# Patient Record
Sex: FEMALE | Race: WHITE | Employment: OTHER | URBAN - METROPOLITAN AREA
[De-identification: names, ages, dates, MRNs, and addresses within clinical notes are randomized per-mention and may not be internally consistent; named-entity substitution may affect disease eponyms.]

---

## 2017-06-23 DIAGNOSIS — E78.5 HYPERLIPIDEMIA WITH TARGET LDL LESS THAN 130: ICD-10-CM

## 2017-06-23 RX ORDER — SIMVASTATIN 40 MG
TABLET ORAL
Qty: 30 TABLET | Refills: 0 | Status: SHIPPED | OUTPATIENT
Start: 2017-06-23 | End: 2017-07-10

## 2017-06-23 NOTE — TELEPHONE ENCOUNTER
Simvastatin       Last Written Prescription Date: 5/31/16  Last Fill Quantity: 90, # refills: 3    Last Office Visit with [facility], [facility] or [facility] prescribing provider:  6/6/16   Future Office Visit:      Cholesterol   Date Value Ref Range Status   04/21/2015 169 <200 mg/dL Final     Comment:     LDL Cholesterol is the primary guide to therapy.   The NCEP recommends further evaluation of: patients with cholesterol greater   than 200 mg/dL if additional risk factors are present, cholesterol greater   than   240 mg/dL, triglycerides greater than 150 mg/dL, or HDL less than 40 mg/dL.       HDL Cholesterol   Date Value Ref Range Status   04/21/2015 63 >50 mg/dL Final     LDL Cholesterol Calculated   Date Value Ref Range Status   04/21/2015 74 0 - 129 mg/dL Final     Comment:     LDL Cholesterol is the primary guide to therapy: LDL-cholesterol goal in high   risk patients is <100 mg/dL and in very high risk patients is <70 mg/dL.       LDL Cholesterol Direct   Date Value Ref Range Status   05/31/2016 110 (H) <100 mg/dL Final     Comment:     Above desirable:  100-129 mg/dl   Borderline High:  130-159 mg/dL   High:             160-189 mg/dL   Very high:       >189 mg/dl       Triglycerides   Date Value Ref Range Status   04/21/2015 158 (H) 0 - 150 mg/dL Final     Cholesterol/HDL Ratio   Date Value Ref Range Status   04/21/2015 2.7 0.0 - 5.0 Final     ALT   Date Value Ref Range Status   05/31/2016 36 0 - 50 U/L Final

## 2017-06-23 NOTE — TELEPHONE ENCOUNTER
Prescription approved per [facility] Refill Protocol.  Patient due for office visit for further refills.  Hiral Cabrera RN - BC

## 2017-06-29 DIAGNOSIS — I10 HYPERTENSION GOAL BP (BLOOD PRESSURE) < 150/90: ICD-10-CM

## 2017-06-29 RX ORDER — LOSARTAN POTASSIUM AND HYDROCHLOROTHIAZIDE 12.5; 5 MG/1; MG/1
TABLET ORAL
Qty: 90 TABLET | Refills: 0 | Status: SHIPPED | OUTPATIENT
Start: 2017-06-29 | End: 2017-07-10

## 2017-06-29 NOTE — TELEPHONE ENCOUNTER
Prescription approved per [facility] Refill Protocol.  Patient due for office visit for further refills.  Hiral Cabrera RN - BC

## 2017-06-29 NOTE — TELEPHONE ENCOUNTER
losartan-hydrochlorothiazide (HYZAAR) 50-12.5 MG per tablet      Last Written Prescription Date: 5/31/2016  Last Fill Quantity: 90, # refills: 3  Last Office Visit with [facility], [facility] or [facility] prescribing provider: 6/6/2016       Potassium   Date Value Ref Range Status   05/31/2016 4.1 3.4 - 5.3 mmol/L Final     Creatinine   Date Value Ref Range Status   05/31/2016 0.60 0.52 - 1.04 mg/dL Final     BP Readings from Last 3 Encounters:   06/06/16 132/86   05/31/16 126/80   12/10/15 137/87

## 2017-07-10 ENCOUNTER — OFFICE VISIT (OUTPATIENT)
Dept: FAMILY MEDICINE | Facility: CLINIC | Age: 72
End: 2017-07-10
Payer: MEDICARE

## 2017-07-10 ENCOUNTER — RADIANT APPOINTMENT (OUTPATIENT)
Dept: MAMMOGRAPHY | Facility: CLINIC | Age: 72
End: 2017-07-10
Attending: FAMILY MEDICINE
Payer: MEDICARE

## 2017-07-10 VITALS
HEART RATE: 75 BPM | OXYGEN SATURATION: 98 % | SYSTOLIC BLOOD PRESSURE: 139 MMHG | HEIGHT: 65 IN | DIASTOLIC BLOOD PRESSURE: 83 MMHG | WEIGHT: 176.8 LBS | RESPIRATION RATE: 18 BRPM | BODY MASS INDEX: 29.46 KG/M2 | TEMPERATURE: 97.4 F

## 2017-07-10 DIAGNOSIS — Z12.31 VISIT FOR SCREENING MAMMOGRAM: ICD-10-CM

## 2017-07-10 DIAGNOSIS — E78.5 HYPERLIPIDEMIA WITH TARGET LDL LESS THAN 130: ICD-10-CM

## 2017-07-10 DIAGNOSIS — I10 HYPERTENSION GOAL BP (BLOOD PRESSURE) < 150/90: Primary | ICD-10-CM

## 2017-07-10 DIAGNOSIS — I87.2 VENOUS (PERIPHERAL) INSUFFICIENCY: ICD-10-CM

## 2017-07-10 DIAGNOSIS — M85.80 OSTEOPENIA, UNSPECIFIED LOCATION: ICD-10-CM

## 2017-07-10 PROCEDURE — G0202 SCR MAMMO BI INCL CAD: HCPCS | Mod: TC

## 2017-07-10 PROCEDURE — 99213 OFFICE O/P EST LOW 20 MIN: CPT | Performed by: FAMILY MEDICINE

## 2017-07-10 RX ORDER — LOSARTAN POTASSIUM AND HYDROCHLOROTHIAZIDE 12.5; 5 MG/1; MG/1
1 TABLET ORAL DAILY
Qty: 90 TABLET | Refills: 3 | Status: SHIPPED | OUTPATIENT
Start: 2017-07-10 | End: 2018-08-01

## 2017-07-10 RX ORDER — SIMVASTATIN 40 MG
40 TABLET ORAL DAILY
Qty: 90 TABLET | Refills: 3 | Status: SHIPPED | OUTPATIENT
Start: 2017-07-10 | End: 2018-08-01

## 2017-07-10 NOTE — MR AVS SNAPSHOT
After Visit Summary   7/10/2017    Tracie Feliciano    MRN: [id number]           Patient Information     Date Of Birth          1945        Visit Information        Provider Department      7/10/2017 10:00 AM Melanie Patel MD [facility]        Today's Diagnoses     Hypertension goal BP (blood pressure) < 150/90    -  1    Hyperlipidemia with target LDL less than 130        Venous (peripheral) insufficiency        Osteopenia, unspecified location        Visit for screening mammogram          Care Instructions    [facility]    If you have any questions regarding to your visit please contact your care team:       Team Red:   Clinic Hours Telephone Number   Dr. Melanie Victor, NP   7am-7pm  Monday - Thursday   7am-5pm  Fridays  [phone]  (Appointment scheduling available 24/7)    Questions about your visit?   Team Line  [phone]   [facility] [place] [facility] [facility] 11am-9pm Monday-Friday Saturday-Sunday- 9am-5pm   [place] - 5pm-9pm Monday-Friday Saturday-Sunday- 9am-5pm  [phone] - [facility]  [phone] - [place]       What options do I have for visits at the clinic other than the traditional office visit?  To expand how we care for you, many of our providers are utilizing electronic visits (e-visits) and telephone visits, when medically appropriate, for interactions with their patients rather than a visit in the clinic.   We also offer nurse visits for many medical concerns. Just like any other service, we will bill your insurance company for this type of visit based on time spent on the phone with your provider. Not all insurance companies cover these visits. Please check with your medical insurance if this type of visit is covered. You will be responsible for any charges that are not paid by your insurance.      E-visits via REPP:  generally incur a $35.00 fee.  Telephone  visits:  Time spent on the phone: *charged based on time that is spent on the phone in increments of 10 minutes. Estimated cost:   5-10 mins $30.00   11-20 mins. $59.00   21-30 mins. $85.00     Use Twiigg (secure email communication and access to your chart) to send your primary care provider a message or make an appointment. Ask someone on your Team how to sign up for Twiigg.  For a Price Quote for your services, please call our [employer] Line at [phone].      As always, Thank you for trusting us with your health care needs!    Paco Alexander            Follow-ups after your visit        Follow-up notes from your care team     Return in about 1 year (around 7/10/2018), or if symptoms worsen or fail to improve, for Wellness visit (fasting labs up to one week prior).      Future tests that were ordered for you today     Open Future Orders        Priority Expected Expires Ordered    BASIC METABOLIC PANEL Routine  7/10/2018 7/10/2017    Lipid panel reflex to direct LDL Routine  7/10/2018 7/10/2017    MA SCREENING DIGITAL BILAT - Future  (s+30) Routine  7/3/2018 7/10/2017            Who to contact     If you have questions or need follow up information about today's clinic visit or your schedule please contact [facility] directly at [phone].  Normal or non-critical lab and imaging results will be communicated to you by Swiftpagehart, letter or phone within 4 business days after the clinic has received the results. If you do not hear from us within 7 days, please contact the clinic through Swiftpagehart or phone. If you have a critical or abnormal lab result, we will notify you by phone as soon as possible.  Submit refill requests through Twiigg or call your pharmacy and they will forward the refill request to us. Please allow 3 business days for your refill to be completed.          Additional Information About Your Visit        Twiigg Information     Twiigg lets you send messages to your  "doctor, view your test results, renew your prescriptions, schedule appointments and more. To sign up, go to [email or web address] . Click on \"Log in\" on the left side of the screen, which will take you to the Welcome page. Then click on \"Sign up Now\" on the right side of the page.     You will be asked to enter the access code listed below, as well as some personal information. Please follow the directions to create your username and password.     Your access code is: [email or web address]  Expires: 10/8/2017 10:32 AM     Your access code will  in 90 days. If you need help or a new code, please call your [facility] clinic or [phone].        Care EveryWhere ID     This is your Care EveryWhere ID. This could be used by other organizations to access your [facility] medical records  [id number]        Your Vitals Were     Pulse Temperature Respirations Height Pulse Oximetry Breastfeeding?    75 97.4  F (36.3  C) (Oral) 18 5' 5\" (1.651 m) 98% No    BMI (Body Mass Index)                   29.42 kg/m2            Blood Pressure from Last 3 Encounters:   07/10/17 139/83   16 132/86   16 126/80    Weight from Last 3 Encounters:   07/10/17 176 lb 12.8 oz (80.2 kg)   16 176 lb (79.8 kg)   16 178 lb (80.7 kg)                 Today's Medication Changes          These changes are accurate as of: 7/10/17 10:32 AM.  If you have any questions, ask your nurse or doctor.               These medicines have changed or have updated prescriptions.        Dose/Directions    losartan-hydrochlorothiazide 50-12.5 MG per tablet   Commonly known as:  HYZAAR   This may have changed:  See the new instructions.   Used for:  Hypertension goal BP (blood pressure) < 150/90   Changed by:  Melanie Patel MD        Dose:  1 tablet   Take 1 tablet by mouth daily   Quantity:  90 tablet   Refills:  3       simvastatin 40 MG tablet   Commonly known as:  ZOCOR   This may have changed:  See the new instructions.   Used for:  " Hyperlipidemia with target LDL less than 130   Changed by:  Melanie Patel MD        Dose:  40 mg   Take 1 tablet (40 mg) by mouth daily   Quantity:  90 tablet   Refills:  3            Where to get your medicines      These medications were sent to [id number] - [place] - [address] AT [facility] [address]  [address] [facility] [facility] [address], [place] [address]     Phone:  [phone]     losartan-hydrochlorothiazide 50-12.5 MG per tablet    simvastatin 40 MG tablet                Primary Care Provider Office Phone # Fax #    Melanie Patel [phone]       [facility] [address] [address]        Equal Access to Services     [facility] HospitalJOSEPHINE : Hadii [place] hadasho Soomaali, waaxda luqadaha, qaybta kaalmada adeegyada, waxjamey merlos . So [facility] [phone].    ATENCIÓN: Si habla español, tiene a barbosa disposición servicios gratuitos de asistencia lingüística. Ll[facility]tering [facility] [phone].    We comply with applicable federal civil rights laws and [place] laws. We do not discriminate on the basis of race, color, national origin, age, disability sex, sexual orientation or gender identity.            Thank you!     Thank you for choosing [facility]  for your care. Our goal is always to provide you with excellent care. Hearing back from our patients is one way we can continue to improve our services. Please take a few minutes to complete the written survey that you may receive in the mail after your visit with us. Thank you!             Your Updated Medication List - Protect others around you: Learn how to safely use, store and throw away your medicines at www.disposemymeds.org.          This list is accurate as of: 7/10/17 10:32 AM.  Always use your most recent med list.                   Brand Name Dispense Instructions for use Diagnosis    fexofenadine 180 MG tablet    ALLEGRA     Take 1 tablet (180 mg) by  mouth daily as needed for allergies        FLONASE 50 MCG/ACT spray   Generic drug:  fluticasone      Spray 1-2 sprays into both nostrils daily        losartan-hydrochlorothiazide 50-12.5 MG per tablet    HYZAAR    90 tablet    Take 1 tablet by mouth daily    Hypertension goal BP (blood pressure) < 150/90       simvastatin 40 MG tablet    ZOCOR    90 tablet    Take 1 tablet (40 mg) by mouth daily    Hyperlipidemia with target LDL less than 130       vitamin D 2000 UNITS tablet      Take 2,000 Units by mouth daily

## 2017-07-10 NOTE — PATIENT INSTRUCTIONS
[facility]    If you have any questions regarding to your visit please contact your care team:       Team Red:   Clinic Hours Telephone Number   Dr. Melanie Victor, NP   7am-7pm  Monday - Thursday   7am-5pm  Fridays  [phone]  (Appointment scheduling available 24/7)    Questions about your visit?   Team Line  [phone]   Urgent Care - [place] - 11am-9pm Monday-Friday Saturday-Sunday- 9am-5pm   [place] - 5pm-9pm Monday-Friday Saturday-Sunday- 9am-5pm  [phone] - [place]   [phone] - [place]       What options do I have for visits at the clinic other than the traditional office visit?  To expand how we care for you, many of our providers are utilizing electronic visits (e-visits) and telephone visits, when medically appropriate, for interactions with their patients rather than a visit in the clinic.   We also offer nurse visits for many medical concerns. Just like any other service, we will bill your insurance company for this type of visit based on time spent on the phone with your provider. Not all insurance companies cover these visits. Please check with your medical insurance if this type of visit is covered. You will be responsible for any charges that are not paid by your insurance.      E-visits via ApexPeak:  generally incur a $35.00 fee.  Telephone visits:  Time spent on the phone: *charged based on time that is spent on the phone in increments of 10 minutes. Estimated cost:   5-10 mins $30.00   11-20 mins. $59.00   21-30 mins. $85.00     Use [employer] (secure email communication and access to your chart) to send your primary care provider a message or make an appointment. Ask someone on your Team how to sign up for ApexPeak.  For a Price Quote for your services, please call our Consumer Price Line at [phone].      As always, Thank you for trusting us with your health care needs!    Paco OROZCO  FLygstad

## 2017-07-17 DIAGNOSIS — I87.2 VENOUS (PERIPHERAL) INSUFFICIENCY: ICD-10-CM

## 2017-07-17 DIAGNOSIS — I10 HYPERTENSION GOAL BP (BLOOD PRESSURE) < 150/90: ICD-10-CM

## 2017-07-17 DIAGNOSIS — E78.5 HYPERLIPIDEMIA WITH TARGET LDL LESS THAN 130: ICD-10-CM

## 2017-07-17 LAB
ANION GAP SERPL CALCULATED.3IONS-SCNC: 10 MMOL/L (ref 3–14)
BUN SERPL-MCNC: 8 MG/DL (ref 7–30)
CALCIUM SERPL-MCNC: 9.9 MG/DL (ref 8.5–10.1)
CHLORIDE SERPL-SCNC: 102 MMOL/L (ref 94–109)
CHOLEST SERPL-MCNC: 166 MG/DL
CO2 SERPL-SCNC: 26 MMOL/L (ref 20–32)
CREAT SERPL-MCNC: 0.54 MG/DL (ref 0.52–1.04)
GFR SERPL CREATININE-BSD FRML MDRD: NORMAL ML/MIN/1.7M2
GLUCOSE SERPL-MCNC: 90 MG/DL (ref 70–99)
HDLC SERPL-MCNC: 56 MG/DL
LDLC SERPL CALC-MCNC: 81 MG/DL
NONHDLC SERPL-MCNC: 110 MG/DL
POTASSIUM SERPL-SCNC: 3.7 MMOL/L (ref 3.4–5.3)
SODIUM SERPL-SCNC: 138 MMOL/L (ref 133–144)
TRIGL SERPL-MCNC: 144 MG/DL

## 2017-07-17 PROCEDURE — 80061 LIPID PANEL: CPT | Performed by: FAMILY MEDICINE

## 2017-07-17 PROCEDURE — 36415 COLL VENOUS BLD VENIPUNCTURE: CPT | Performed by: FAMILY MEDICINE

## 2017-07-17 PROCEDURE — 80048 BASIC METABOLIC PNL TOTAL CA: CPT | Performed by: FAMILY MEDICINE

## 2017-07-17 NOTE — LETTER
[address] [place] [address]    July 18, 2017    Tracie GREGORY Feliciano  [address] [facility] [place] [address]          Dear Tracie,    Your results are normal    Enclosed is a copy of your results.     Results for orders placed or performed in visit on 07/17/17   BASIC METABOLIC PANEL   Result Value Ref Range    Sodium 138 133 - 144 mmol/L    Potassium 3.7 3.4 - 5.3 mmol/L    Chloride 102 94 - 109 mmol/L    Carbon Dioxide 26 20 - 32 mmol/L    Anion Gap 10 3 - 14 mmol/L    Glucose 90 70 - 99 mg/dL    Urea Nitrogen 8 7 - 30 mg/dL    Creatinine 0.54 0.52 - 1.04 mg/dL    GFR Estimate >90  Non  GFR Calc   >60 mL/min/1.7m2    GFR Estimate If Black >90   GFR Calc   >60 mL/min/1.7m2    Calcium 9.9 8.5 - 10.1 mg/dL   Lipid panel reflex to direct LDL   Result Value Ref Range    Cholesterol 166 <200 mg/dL    Triglycerides 144 <150 mg/dL    HDL Cholesterol 56 >49 mg/dL    LDL Cholesterol Calculated 81 <100 mg/dL    Non HDL Cholesterol 110 <130 mg/dL       If you have any questions or concerns, please call myself or my nurse at [phone].      Sincerely,        Melanie Patel MD/HA

## 2017-07-18 ENCOUNTER — RADIANT APPOINTMENT (OUTPATIENT)
Dept: MAMMOGRAPHY | Facility: CLINIC | Age: 72
End: 2017-07-18
Attending: FAMILY MEDICINE
Payer: MEDICARE

## 2017-07-18 DIAGNOSIS — R92.8 ABNORMAL MAMMOGRAM: ICD-10-CM

## 2017-07-18 PROCEDURE — G0206 DX MAMMO INCL CAD UNI: HCPCS | Mod: LT | Performed by: RADIOLOGY

## 2017-07-20 ENCOUNTER — TELEPHONE (OUTPATIENT)
Dept: FAMILY MEDICINE | Facility: CLINIC | Age: 72
End: 2017-07-20

## 2017-07-20 NOTE — TELEPHONE ENCOUNTER
Reason for Call:  Other call back    Detailed comments:  Patient calling. She received the lab results. Please call her to explain further.     Phone Number Patient can be reached at: Home number on file [phone] (home)    Best Time:  Any     Can we leave a detailed message on this number? YES    Call taken on 7/20/2017 at 1:21 PM by Katrina Kowalski

## 2017-07-20 NOTE — TELEPHONE ENCOUNTER
RN spoke with patient and had some questions about what the number of her results ment and writer was able to answer all of her questions.  Pt would like for writer to notify her doctor that she was informed she doesn't need mammo this year because she has completed one last year, but she went ahead and got her mammo done and they found out there is a suspicious area that requires a biopsy and she is going to have that done as well. Pt would like for Dr. Patel to know that she is going to do a yearly mammo regarding the requirement and states all women should do mammo on yearly basis as well.  Will route to PCP as FYI per patient request.    Jennifer PALMER RN, BSN

## 2017-07-25 ENCOUNTER — TELEPHONE (OUTPATIENT)
Dept: FAMILY MEDICINE | Facility: CLINIC | Age: 72
End: 2017-07-25

## 2017-07-25 NOTE — TELEPHONE ENCOUNTER
Confirmed fax orders to SI fax number. Rain Green,     [facility]  [address]  [address] [place] [address]  Scheduling Line: [phone]  Clinic Phone: [phone]  Clinic Fax: [phone]    Called and informed Tracie that the order was faxed. She will call to scheduled.

## 2017-07-25 NOTE — TELEPHONE ENCOUNTER
Reason for Call: Request for an order or referral:    Order or referral being requested: Orders.    Date needed: at your convenience    Has the patient been seen by the PCP for this problem? YES    Additional comments: Patient is scheduled for a breast biopsy tomorrow at [facility] . [facility] [place] . Requesting orders to be sent to [facility].      Phone number Patient can be reached at:  Cell number on file:    Telephone Information:   Mobile [phone]       Best Time:  any    Can we leave a detailed message on this number?  YES    Call taken on 7/25/2017 at 9:18 AM by Barby Lowe

## 2017-08-01 ENCOUNTER — TRANSFERRED RECORDS (OUTPATIENT)
Dept: HEALTH INFORMATION MANAGEMENT | Facility: CLINIC | Age: 72
End: 2017-08-01

## 2017-08-08 ENCOUNTER — TRANSFERRED RECORDS (OUTPATIENT)
Dept: HEALTH INFORMATION MANAGEMENT | Facility: CLINIC | Age: 72
End: 2017-08-08

## 2017-08-14 ENCOUNTER — OFFICE VISIT (OUTPATIENT)
Dept: FAMILY MEDICINE | Facility: CLINIC | Age: 72
End: 2017-08-14
Payer: MEDICARE

## 2017-08-14 VITALS
HEIGHT: 65 IN | SYSTOLIC BLOOD PRESSURE: 140 MMHG | BODY MASS INDEX: 28.99 KG/M2 | HEART RATE: 66 BPM | OXYGEN SATURATION: 98 % | DIASTOLIC BLOOD PRESSURE: 84 MMHG | TEMPERATURE: 97.9 F | WEIGHT: 174 LBS

## 2017-08-14 DIAGNOSIS — Z01.818 PREOP GENERAL PHYSICAL EXAM: Primary | ICD-10-CM

## 2017-08-14 DIAGNOSIS — I10 HYPERTENSION GOAL BP (BLOOD PRESSURE) < 150/90: ICD-10-CM

## 2017-08-14 DIAGNOSIS — E83.52 HYPERCALCEMIA: ICD-10-CM

## 2017-08-14 DIAGNOSIS — I87.2 VENOUS (PERIPHERAL) INSUFFICIENCY: ICD-10-CM

## 2017-08-14 DIAGNOSIS — M85.80 OSTEOPENIA, UNSPECIFIED LOCATION: ICD-10-CM

## 2017-08-14 DIAGNOSIS — E55.9 VITAMIN D DEFICIENCY: ICD-10-CM

## 2017-08-14 DIAGNOSIS — D05.02 BREAST NEOPLASM, TIS (LCIS), LEFT: ICD-10-CM

## 2017-08-14 LAB
ANION GAP SERPL CALCULATED.3IONS-SCNC: 8 MMOL/L (ref 3–14)
BUN SERPL-MCNC: 8 MG/DL (ref 7–30)
CALCIUM SERPL-MCNC: 10.3 MG/DL (ref 8.5–10.1)
CHLORIDE SERPL-SCNC: 99 MMOL/L (ref 94–109)
CO2 SERPL-SCNC: 29 MMOL/L (ref 20–32)
CREAT SERPL-MCNC: 0.58 MG/DL (ref 0.52–1.04)
GFR SERPL CREATININE-BSD FRML MDRD: ABNORMAL ML/MIN/1.7M2
GLUCOSE SERPL-MCNC: 99 MG/DL (ref 70–99)
HGB BLD-MCNC: 12.9 G/DL (ref 11.7–15.7)
POTASSIUM SERPL-SCNC: 3.6 MMOL/L (ref 3.4–5.3)
SODIUM SERPL-SCNC: 136 MMOL/L (ref 133–144)

## 2017-08-14 PROCEDURE — 36415 COLL VENOUS BLD VENIPUNCTURE: CPT | Performed by: FAMILY MEDICINE

## 2017-08-14 PROCEDURE — 93000 ELECTROCARDIOGRAM COMPLETE: CPT | Performed by: FAMILY MEDICINE

## 2017-08-14 PROCEDURE — 99214 OFFICE O/P EST MOD 30 MIN: CPT | Performed by: FAMILY MEDICINE

## 2017-08-14 PROCEDURE — 80048 BASIC METABOLIC PNL TOTAL CA: CPT | Performed by: FAMILY MEDICINE

## 2017-08-14 PROCEDURE — 85018 HEMOGLOBIN: CPT | Performed by: FAMILY MEDICINE

## 2017-08-14 NOTE — LETTER
[address] [place] [address]    August 15, 2017    Tracie Feliciano  [address] [facility] [address]          Dear Tracie,    Your calcium may be a bit high. Avoid taking supplements or antacids containing calcium and follow-up for lab only recheck in 3 months. Your kidney, blood glucose, and anemia tests are normal    Enclosed is a copy of your results.     Results for orders placed or performed in visit on 08/14/17   Hemoglobin   Result Value Ref Range    Hemoglobin 12.9 11.7 - 15.7 g/dL   Basic metabolic panel   Result Value Ref Range    Sodium 136 133 - 144 mmol/L    Potassium 3.6 3.4 - 5.3 mmol/L    Chloride 99 94 - 109 mmol/L    Carbon Dioxide 29 20 - 32 mmol/L    Anion Gap 8 3 - 14 mmol/L    Glucose 99 70 - 99 mg/dL    Urea Nitrogen 8 7 - 30 mg/dL    Creatinine 0.58 0.52 - 1.04 mg/dL    GFR Estimate >90  Non  GFR Calc   >60 mL/min/1.7m2    GFR Estimate If Black >90   GFR Calc   >60 mL/min/1.7m2    Calcium 10.3 (H) 8.5 - 10.1 mg/dL       If you have any questions or concerns, please call myself or my nurse at [phone].      Sincerely,        Melanie Patle MD/HA

## 2017-08-14 NOTE — NURSING NOTE
"Chief Complaint   Patient presents with     Pre-Op Exam       Initial /84 (BP Location: Left arm, Patient Position: Chair, Cuff Size: Adult Regular)  Pulse 66  Temp 97.9  F (36.6  C) (Oral)  Ht 5' 5\" (1.651 m)  Wt 174 lb (78.9 kg)  SpO2 98%  BMI 28.96 kg/m2 Estimated body mass index is 28.96 kg/(m^2) as calculated from the following:    Height as of this encounter: 5' 5\" (1.651 m).    Weight as of this encounter: 174 lb (78.9 kg).  Medication Reconciliation: complete    "

## 2017-08-14 NOTE — PROGRESS NOTES
[facility]  [address] [place] [address]  [place] [address]  [phone]  Dept: [phone]    PRE-OP EVALUATION:  Today's date: 2017    Tracie Feliciano (: 1945) presents for pre-operative evaluation assessment as requested by Dr. Otto.  She requires evaluation and anesthesia risk assessment prior to undergoing surgery/procedure for treatment of left breast LCIS.  Proposed procedure: Left breast lumpectomy    Date of Surgery/ Procedure: 2017  Time of Surgery/ Procedure: 10:15am  Hospital/Surgical Facility: [facility]  Fax number for surgical facility: [phone]  Primary Physician: Melanie Patel  Type of Anesthesia Anticipated: to be determined    Patient has a Health Care Directive or Living Will:  NO    1. NO - Do you have a history of heart attack, stroke, stent, bypass or surgery on an artery in the head, neck, heart or legs?  2. NO - Do you ever have any pain or discomfort in your chest?  3. NO - Do you have a history of  Heart Failure?  4. NO - Are you troubled by shortness of breath when: walking on the level, up a slight hill or at night?  5. NO - Do you currently have a cold, bronchitis or other respiratory infection?  6. NO - Do you have a cough, shortness of breath or wheezing?  7. NO - Do you sometimes get pains in the calves of your legs when you walk?  8. NO - Do you or anyone in your family have previous history of blood clots?  9. NO - Do you or does anyone in your family have a serious bleeding problem such as prolonged bleeding following surgeries or cuts?  10. NO - Have you ever had problems with anemia or been told to take iron pills?  11. NO - Have you had any abnormal blood loss such as black, tarry or bloody stools, or abnormal vaginal bleeding?  12. NO - Have you ever had a blood transfusion?  13. NO - Have you or any of your relatives ever had problems with anesthesia?  14. YES - DO YOU HAVE SLEEP APNEA, EXCESSIVE SNORING OR DAYTIME DROWSINESS?    15. NO - Do  you have any prosthetic heart valves?  16. NO - Do you have prosthetic joints?  17. NO - Is there any chance that you may be pregnant?    HPI:                                                    Tracie Feliciano is a 72 year old female who presents with left abnormal mammogram and breast biopsy without symptoms. Symptom onset has been sudden, unchanged for a time period of 4 weeks. Severity is described as none. Course of her symptoms over time is unchanged.    See problem list for active medical problems.  Problems all longstanding and stable, except as noted/documented.  See ROS for pertinent symptoms related to these conditions.                                                                                                  .    MEDICAL HISTORY:                                                    Patient Active Problem List    Diagnosis Date Noted     Breast neoplasm, Tis (LCIS), left      Priority: High     Hypertension goal BP (blood pressure) < 150/90 11/19/2010     Priority: High     Hyperlipidemia with target LDL less than 130      Priority: High     Pravastatin 80 ineffective       Venous (peripheral) insufficiency 06/06/2016     Priority: Medium     Vitamin D deficiency      Priority: Medium     Osteopenia      Priority: Medium     Cataract      Priority: Low     Advanced directives, counseling/discussion 07/05/2011     Priority: Low     Advance Directive Problem List Overview:   Name Relationship Phone    Primary Health Care Agent            Alternative Health Care Agent          Discussed advance care planning with patient; information given to patient to review. 7/5/2011          Allergic rhinitis      Priority: Low     Allergist, history of allergy injections, nasal sprays        Past Medical History:   Diagnosis Date     Allergic rhinitis     allergy consultation     Breast neoplasm, Tis (LCIS), left      Cataract      Colon adenomas 6/25/2007     EUGENIE (generalized anxiety disorder)      HTN  (hypertension)      Hyperlipidemia LDL goal < 130      Impaired fasting glucose 5/21/2013     LFT's abnormal     elevated GGT, resolved     Osteopenia      Venous (peripheral) insufficiency 6/6/2016     Vitamin D deficiency      Past Surgical History:   Procedure Laterality Date     C/SECTION, LOW TRANSVERSE  4/2/80     COLONOSCOPY  6/21/2012    Procedure: COLONOSCOPY;  COLONOSCOPY, SCREEN, PREVIOUS POLYP;  Surgeon: Maverick Maradiaga MD;  Location: [facility] OR     HYSTERECTOMY, PAP NO LONGER INDICATED  9/30/09    BSO      Current Outpatient Prescriptions   Medication Sig Dispense Refill     losartan-hydrochlorothiazide (HYZAAR) 50-12.5 MG per tablet Take 1 tablet by mouth daily 90 tablet 3     simvastatin (ZOCOR) 40 MG tablet Take 1 tablet (40 mg) by mouth daily 90 tablet 3     Cholecalciferol (VITAMIN D) 2000 UNITS tablet Take 2,000 Units by mouth daily       OTC products: None, except as noted above    Allergies   Allergen Reactions     Aspirin      Reactive gastropathy     Contrast Dye      sneezing     Lisinopril Cough      Latex Allergy: NO    Social History   Substance Use Topics     Smoking status: Former Smoker     Packs/day: 0.50     Years: 1.00     Types: Cigarettes     Quit date: 1/1/1967     Smokeless tobacco: Never Used     Alcohol use 1.5 oz/week     3 Standard drinks or equivalent per week      Comment: wine     History   Drug Use No     Social History     Social History     Marital status:      Spouse name: Bro     Number of children: 2     Years of education: 14     Occupational History     [employer] customer service Retired     Social History Main Topics     Smoking status: Former Smoker     Packs/day: 0.50     Years: 1.00     Types: Cigarettes     Quit date: 1/1/1967     Smokeless tobacco: Never Used     Alcohol use 1.5 oz/week     3 Standard drinks or equivalent per week      Comment: wine     Drug use: No     Sexual activity: Yes     Partners: Male     Birth control/ protection:  "Surgical, Post-menopausal     Other Topics Concern     Not on file     Social History Narrative     Family History   Problem Relation Age of Onset     CANCER Mother      d. pelvic     C.A.D. Mother      ?     C.A.D. Father 79     MI, d.     Alzheimer Disease Father      GASTROINTESTINAL DISEASE Father      PUD     CANCER Maternal Aunt      GI?     DIABETES No family hx of       REVIEW OF SYSTEMS:                                                    C: NEGATIVE for fever, chills, change in weight  INTEGUMENTARY/SKIN: varicosities   E: NEGATIVE for vision changes or irritation  E/M: NEGATIVE for ear, mouth and throat problems  R: NEGATIVE for significant cough or SOB  BREAST: see HPI   CV: NEGATIVE for chest pain/chest pressure and palpitations; chronic lower extremity edema   GI: NEGATIVE for nausea, abdominal pain, heartburn, or change in bowel habits  : NEGATIVE for frequency, dysuria, or hematuria  M: NEGATIVE for significant arthralgias or myalgia  N: NEGATIVE for weakness, dizziness or paresthesias  E: NEGATIVE for temperature intolerance, skin/hair changes  H: NEGATIVE for bleeding problems  P: NEGATIVE for changes in mood or affect  Complete ROS otherwise negative.     EXAM:                                                    /84 (BP Location: Left arm, Patient Position: Chair, Cuff Size: Adult Regular)  Pulse 66  Temp 97.9  F (36.6  C) (Oral)  Ht 5' 5\" (1.651 m)  Wt 174 lb (78.9 kg)  SpO2 98%  BMI 28.96 kg/m2    GENERAL APPEARANCE: healthy, alert and no distress     EYES: EOMI, PERRL     HENT: ear canals and TM's normal and nose and mouth without ulcers or lesions     NECK: no adenopathy, no asymmetry, masses, or scars and thyroid normal to palpation     RESP: lungs clear to auscultation - no rales, rhonchi or wheezes     CV: regular rates and rhythm, normal S1 S2, no S3 or S4 and no murmur, click or rub     ABDOMEN:  soft, nontender, no HSM or masses and bowel sounds normal     MS: extremities " normal- no gross deformities noted, no evidence of inflammation in joints, FROM in all extremities.     SKIN: bruising on left breast;      NEURO: Normal strength and tone, sensory exam grossly normal, mentation intact and speech normal     PSYCH: mentation appears normal. and affect normal/bright     LYMPHATICS: No axillary, cervical, or supraclavicular nodes    DIAGNOSTICS:                                                    EKG: appears normal, NSR, normal axis, normal intervals, no acute ST/T changes c/w ischemia, no LVH by voltage criteria, there are no prior tracings available    Recent Labs   Lab Test  07/17/17   1013  05/31/16   1023   10/16/13   1010   05/20/13   1154   HGB   --   14.4   --   13.4   < >  Canceled, Test credited CORRECTED ON 05/20 AT 2036: PREVIOUSLY REPORTED AS Duplicate request CORRECTED  ON 05/20 AT 2034: PREVIOUSLY REPORTED AS 9.1  9.1*   PLT   --   176   --    --    --   208   NA  138  136   < >  136   --   137   POTASSIUM  3.7  4.1   < >  4.1   --   4.2   CR  0.54  0.60   < >  0.60   --   0.58    < > = values in this interval not displayed.        IMPRESSION:                                                    Reason for surgery/procedure: left breast LCIS   Diagnosis/reason for consult: hypertension     The proposed surgical procedure is considered LOW risk.    REVISED CARDIAC RISK INDEX  The patient has the following serious cardiovascular risks for perioperative complications such as (MI, PE, VFib and 3  AV Block):  No serious cardiac risks  INTERPRETATION: 0 risks: Class I (very low risk - 0.4% complication rate)    The patient has the following additional risks for perioperative complications:  No identified additional risks      ICD-10-CM    1. Preop general physical exam Z01.818 EKG 12-lead complete w/read - Clinics     EKG 12-lead complete w/read - Clinics     Hemoglobin     Basic metabolic panel   2. Breast neoplasm, Tis (LCIS), left D05.02    3. Hypertension goal BP (blood  pressure) < 150/90 I10 Basic metabolic panel   4. Venous (peripheral) insufficiency I87.2        RECOMMENDATIONS:                                                    --Patient is to take all scheduled medications on the day of surgery EXCEPT for modifications listed below.    ACE Inhibitor or Angiotensin Receptor Blocker (ARB) Use  Ace inhibitor or Angiotensin Receptor Blocker (ARB) and should HOLD this medication for the 24 hours prior to surgery.        APPROVAL GIVEN to proceed with proposed procedure, without further diagnostic evaluation       Signed Electronically by: Melanie Patel MD    Copy of this evaluation report is provided to requesting physician.    [facility] Preop Guidelines

## 2017-08-14 NOTE — MR AVS SNAPSHOT
After Visit Summary   8/14/2017    Tracie Feliciano    MRN: [id number]           Patient Information     Date Of Birth          1945        Visit Information        Provider Department      8/14/2017 1:40 PM Melanie Patel MD [facility]        Today's Diagnoses     Preop general physical exam    -  1    Breast neoplasm, Tis (LCIS), left        Hypertension goal BP (blood pressure) < 150/90        Venous (peripheral) insufficiency          Care Instructions    [facility]    If you have any questions regarding to your visit please contact your care team:       Team Red:   Clinic Hours Telephone Number   Dr. Melanie Victor, NP   7am-7pm  Monday - Thursday   7am-5pm  Fridays  [phone]  (Appointment scheduling available 24/7)    Questions about your visit?   Team Line  [phone]   [facility] [place] [facility] 11am-9pm Monday-Friday Saturday-Sunday- 9am-5pm   [place] - 5pm-9pm Monday-Friday Saturday-Sunday- 9am-5pm  [phone] - [facility]  [phone] - [place]       What options do I have for visits at the clinic other than the traditional office visit?  To expand how we care for you, many of our providers are utilizing electronic visits (e-visits) and telephone visits, when medically appropriate, for interactions with their patients rather than a visit in the clinic.   We also offer nurse visits for many medical concerns. Just like any other service, we will bill your insurance company for this type of visit based on time spent on the phone with your provider. Not all insurance companies cover these visits. Please check with your medical insurance if this type of visit is covered. You will be responsible for any charges that are not paid by your insurance.      E-visits via SprayCool:  generally incur a $35.00 fee.  Telephone visits:  Time spent on the phone: *charged based on time  that is spent on the phone in increments of 10 minutes. Estimated cost:   5-10 mins $30.00   11-20 mins. $59.00   21-30 mins. $85.00     Use MyMoneyPlatformhart (secure email communication and access to your chart) to send your primary care provider a message or make an appointment. Ask someone on your Team how to sign up for [employer].  For a Price Quote for your services, please call our [employer] Line at [phone].      As always, Thank you for trusting us with your health care needs!    Before Your Surgery      Call your surgeon if there is any change in your health. This includes signs of a cold or flu (such as a sore throat, runny nose, cough, rash or fever).    Do not smoke, drink alcohol or take over the counter medicine (unless your surgeon or primary care doctor tells you to) for the 24 hours before and after surgery.    If you take prescribed drugs: Follow your doctor s orders about which medicines to take and which to stop until after surgery.    Eating and drinking prior to surgery: follow the instructions from your surgeon    Take a shower or bath the night before surgery. Use the soap your surgeon gave you to gently clean your skin. If you do not have soap from your surgeon, use your regular soap. Do not shave or scrub the surgery site.  Wear clean pajamas and have clean sheets on your bed.     Discharged by Jazmyn Benitez MA.            Follow-ups after your visit        Follow-up notes from your care team     Return in about 1 year (around 8/14/2018) for Wellness visit (fasting labs up to one week prior).      Who to contact     If you have questions or need follow up information about today's clinic visit or your schedule please contact [facility] [place] [facility]\"" directly at [phone].  Normal or non-critical lab and imaging results will be communicated to you by MyChart, letter or phone within 4 business days after the clinic has received the results. If you do not hear from us within 7 days, please  "contact the clinic through [email or web address] or phone. If you have a critical or abnormal lab result, we will notify you by phone as soon as possible.  Submit refill requests through [email or web address] or call your pharmacy and they will forward the refill request to us. Please allow 3 business days for your refill to be completed.          Additional Information About Your Visit        [employer] Information     [email or web address] lets you send messages to your doctor, view your test results, renew your prescriptions, schedule appointments and more. To sign up, go to [email or web address] . Click on \"Log in\" on the left side of the screen, which will take you to the Welcome page. Then click on \"Sign up Now\" on the right side of the page.     You will be asked to enter the access code listed below, as well as some personal information. Please follow the directions to create your username and password.     Your access code is: [email or web address]  Expires: 10/8/2017 10:32 AM     Your access code will  in 90 days. If you need help or a new code, please call your [facility] clinic or [phone].        Care EveryWhere ID     This is your Care EveryWhere ID. This could be used by other organizations to access your [facility] medical records  [id number]        Your Vitals Were     Pulse Temperature Height Pulse Oximetry BMI (Body Mass Index)       66 97.9  F (36.6  C) (Oral) 5' 5\" (1.651 m) 98% 28.96 kg/m2        Blood Pressure from Last 3 Encounters:   17 140/84   07/10/17 139/83   16 132/86    Weight from Last 3 Encounters:   17 174 lb (78.9 kg)   07/10/17 176 lb 12.8 oz (80.2 kg)   16 176 lb (79.8 kg)              We Performed the Following     Basic metabolic panel     EKG 12-lead complete w/read - Clinics     EKG 12-lead complete w/read - Clinics     Hemoglobin        Primary Care Provider Office Phone # Fax #    Melanie Patel [phone] [address] [facility] [place] [address]        Equal Access to " Services     [facility]: Hadii aad ku hadariellegage [place], waclaudioda luqadaha, qaybta kaalmachantel omer, manisha phillip. So [facility] [phone].    ATENCIÓN: Si habla espayden, tiene a barbosa disposición servicios gratuitos de asistencia lingüística. Llame al [phone].    We comply with applicable federal civil rights laws and [place] laws. We do not discriminate on the basis of race, color, national origin, age, disability sex, sexual orientation or gender identity.            Thank you!     Thank you for choosing [facility]  for your care. Our goal is always to provide you with excellent care. Hearing back from our patients is one way we can continue to improve our services. Please take a few minutes to complete the written survey that you may receive in the mail after your visit with us. Thank you!             Your Updated Medication List - Protect others around you: Learn how to safely use, store and throw away your medicines at www.disposemymeds.org.          This list is accurate as of: 8/14/17  2:40 PM.  Always use your most recent med list.                   Brand Name Dispense Instructions for use Diagnosis    losartan-hydrochlorothiazide 50-12.5 MG per tablet    HYZAAR    90 tablet    Take 1 tablet by mouth daily    Hypertension goal BP (blood pressure) < 150/90       simvastatin 40 MG tablet    ZOCOR    90 tablet    Take 1 tablet (40 mg) by mouth daily    Hyperlipidemia with target LDL less than 130       vitamin D 2000 UNITS tablet      Take 2,000 Units by mouth daily

## 2017-08-14 NOTE — PATIENT INSTRUCTIONS
[facility]    If you have any questions regarding to your visit please contact your care team:       Team Red:   Clinic Hours Telephone Number   Dr. Melanie Victor, NP   7am-7pm  Monday - Thursday   7am-5pm  Fridays  [phone]  (Appointment scheduling available 24/7)    Questions about your visit?   Team Line  [phone]   [facility] [place] [facility] 11am-9pm Monday-Friday Saturday-Sunday- 9am-5pm   [place] - 5pm-9pm Monday-Friday Saturday-Sunday- 9am-5pm  [phone] - [place]   [phone] - [place]       What options do I have for visits at the clinic other than the traditional office visit?  To expand how we care for you, many of our providers are utilizing electronic visits (e-visits) and telephone visits, when medically appropriate, for interactions with their patients rather than a visit in the clinic.   We also offer nurse visits for many medical concerns. Just like any other service, we will bill your insurance company for this type of visit based on time spent on the phone with your provider. Not all insurance companies cover these visits. Please check with your medical insurance if this type of visit is covered. You will be responsible for any charges that are not paid by your insurance.      E-visits via [email or web address]:  generally incur a $35.00 fee.  Telephone visits:  Time spent on the phone: *charged based on time that is spent on the phone in increments of 10 minutes. Estimated cost:   5-10 mins $30.00   11-20 mins. $59.00   21-30 mins. $85.00     Use [employer] (secure email communication and access to your chart) to send your primary care provider a message or make an appointment. Ask someone on your Team how to sign up for [email or web address].  For a Price Quote for your services, please call our Consumer Price Line at [phone].      As always, Thank you for trusting us with your health care needs!    Before  Your Surgery      Call your surgeon if there is any change in your health. This includes signs of a cold or flu (such as a sore throat, runny nose, cough, rash or fever).    Do not smoke, drink alcohol or take over the counter medicine (unless your surgeon or primary care doctor tells you to) for the 24 hours before and after surgery.    If you take prescribed drugs: Follow your doctor s orders about which medicines to take and which to stop until after surgery.    Eating and drinking prior to surgery: follow the instructions from your surgeon    Take a shower or bath the night before surgery. Use the soap your surgeon gave you to gently clean your skin. If you do not have soap from your surgeon, use your regular soap. Do not shave or scrub the surgery site.  Wear clean pajamas and have clean sheets on your bed.     Discharged by Jazmyn Benitez MA.

## 2017-08-14 NOTE — Clinical Note
Please fax to [facility] Fax number for surgical facility: [phone] with labs, EKG, and xray reports if done. Melanie Patel MD

## 2017-08-15 PROBLEM — E83.52 HYPERCALCEMIA: Status: ACTIVE | Noted: 2017-08-15

## 2017-08-15 NOTE — PROGRESS NOTES
Mail letter:  Your calcium may be a bit high. Avoid taking supplements or antacids containing calcium and follow-up for lab only recheck in 3 months. Your kidney, blood glucose, and anemia tests are normal.  Melanie Patel MD

## 2017-08-17 ENCOUNTER — TELEPHONE (OUTPATIENT)
Dept: FAMILY MEDICINE | Facility: CLINIC | Age: 72
End: 2017-08-17

## 2017-08-17 NOTE — TELEPHONE ENCOUNTER
Ok to eat all dairy. Ok for surgery.   Stop calcium supplement and MVI with calcium  Follow-up labs 2 to 3 months   Melanie Patel MD

## 2017-08-17 NOTE — TELEPHONE ENCOUNTER
Patient notified of providers message as written.  Patient states she does not take any calcium or multivitamin tablets and she is wondering if she should continue her Vitamin D?  Patient wondering how to get the extra calcium out if she does not reduce her dietary amount- patient referring to the mammogram that states patient has calcifications.     Please advise   Meredith Hagan RN

## 2017-08-17 NOTE — TELEPHONE ENCOUNTER
Reason for Call:  Other call back    Detailed comments: Patient states she got her results back today showing a high calcium. Patient has breast biopsy tomorrow at 8:45 am at [place]. She is wondering if she should stop eating yogurt and taking her calcium vitamins. Please call to discuss.     Phone Number Patient can be reached at: Home number on file [phone] (home)    Best Time: any    Can we leave a detailed message on this number? YES    Call taken on 8/17/2017 at 2:01 PM by Zachary Adler

## 2017-08-29 ENCOUNTER — TELEPHONE (OUTPATIENT)
Dept: FAMILY MEDICINE | Facility: CLINIC | Age: 72
End: 2017-08-29

## 2017-08-29 NOTE — TELEPHONE ENCOUNTER
Reason for Call:  Other Referral    Detailed comments: Patient states she had a breast biopsy done at [facility] on 8- by Dr. Otto. She would like a referral to follow up with Dr. Kwong oncologist at [place] and is not sure if she should need to get the referral from you or from Dr. Otto. Please call.    Phone Number Patient can be reached at: Home number on file [phone] (home)    Best Time: any    Can we leave a detailed message on this number? YES    Call taken on 8/29/2017 at 11:39 AM by Elizabeth King

## 2017-08-29 NOTE — TELEPHONE ENCOUNTER
Called to inform of the message below. Rain Green,     She understood. She will still contact her insurance.

## 2017-08-29 NOTE — TELEPHONE ENCOUNTER
No insurance referral needed.     Thank you,   Lilia Johnson   Referral Department  [phone]

## 2017-09-01 NOTE — PROGRESS NOTES
"SUBJECTIVE:  Tracie Feliciano is a 72 year old female who presents with follow-up of breast cancer and recommendation for oncology consultation. Symptom onset has been improving for a time period of weeks. Severity is described as mild. Course of her symptoms over time is improving since her lumpectomy.      Hypertension well controlled on current medications without side effects, chest pain, or dyspnea. Patient has osteopenia by prior DEXA scan in unknown control without medications.     OBJECTIVE:  EXAM:  /76 (BP Location: Left arm, Patient Position: Chair, Cuff Size: Adult Regular)  Pulse 75  Temp 97.3  F (36.3  C) (Oral)  Resp 16  Ht 5' 5\" (1.651 m)  Wt 173 lb 6.4 oz (78.7 kg)  SpO2 99%  Breastfeeding? 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     (E83.52) Hypercalcemia  Comment: may be due to HCTZ   Plan: check labs today - consider change in blood pressure medication       Melanie Patel MD    "

## 2017-09-01 NOTE — PATIENT INSTRUCTIONS
[facility]    If you have any questions regarding to your visit please contact your care team:       Team Red:   Clinic Hours Telephone Number   Dr. Melanie Victor, NP   7am-7pm  Monday - Thursday   7am-5pm  Fridays  [phone]  (Appointment scheduling available 24/7)    Questions about your visit?   Team Line  [phone]   [facility] [place] [facility] 11am-9pm Monday-Friday Saturday-Sunday- 9am-5pm   [place] - 5pm-9pm Monday-Friday Saturday-Sunday- 9am-5pm  [phone] - [place]   [phone] - [place]       What options do I have for visits at the clinic other than the traditional office visit?  To expand how we care for you, many of our providers are utilizing electronic visits (e-visits) and telephone visits, when medically appropriate, for interactions with their patients rather than a visit in the clinic.   We also offer nurse visits for many medical concerns. Just like any other service, we will bill your insurance company for this type of visit based on time spent on the phone with your provider. Not all insurance companies cover these visits. Please check with your medical insurance if this type of visit is covered. You will be responsible for any charges that are not paid by your insurance.      E-visits via [employer]:  generally incur a $35.00 fee.  Telephone visits:  Time spent on the phone: *charged based on time that is spent on the phone in increments of 10 minutes. Estimated cost:   5-10 mins $30.00   11-20 mins. $59.00   21-30 mins. $85.00     Use [employer] (secure email communication and access to your chart) to send your primary care provider a message or make an appointment. Ask someone on your Team how to sign up for [employer].  For a Price Quote for your services, please call our Consumer Price Line at [phone].      As always, Thank you for trusting us with your health care needs!    Discharged  by Jazmyn Benitez MA.

## 2017-09-11 ENCOUNTER — OFFICE VISIT (OUTPATIENT)
Dept: FAMILY MEDICINE | Facility: CLINIC | Age: 72
End: 2017-09-11
Payer: MEDICARE

## 2017-09-11 VITALS
WEIGHT: 173.4 LBS | BODY MASS INDEX: 28.89 KG/M2 | DIASTOLIC BLOOD PRESSURE: 76 MMHG | SYSTOLIC BLOOD PRESSURE: 112 MMHG | OXYGEN SATURATION: 99 % | TEMPERATURE: 97.3 F | HEIGHT: 65 IN | RESPIRATION RATE: 16 BRPM | HEART RATE: 75 BPM

## 2017-09-11 DIAGNOSIS — E21.3 HYPERPARATHYROIDISM (H): ICD-10-CM

## 2017-09-11 DIAGNOSIS — E83.52 HYPERCALCEMIA: ICD-10-CM

## 2017-09-11 DIAGNOSIS — Z23 NEED FOR PROPHYLACTIC VACCINATION AND INOCULATION AGAINST INFLUENZA: ICD-10-CM

## 2017-09-11 DIAGNOSIS — D05.02 BREAST NEOPLASM, TIS (LCIS), LEFT: Primary | ICD-10-CM

## 2017-09-11 DIAGNOSIS — M85.80 OSTEOPENIA, UNSPECIFIED LOCATION: ICD-10-CM

## 2017-09-11 DIAGNOSIS — E55.9 VITAMIN D DEFICIENCY: ICD-10-CM

## 2017-09-11 DIAGNOSIS — I10 HYPERTENSION GOAL BP (BLOOD PRESSURE) < 150/90: ICD-10-CM

## 2017-09-11 LAB
ALBUMIN SERPL-MCNC: 3.8 G/DL (ref 3.4–5)
ANION GAP SERPL CALCULATED.3IONS-SCNC: 9 MMOL/L (ref 3–14)
BUN SERPL-MCNC: 12 MG/DL (ref 7–30)
CALCIUM SERPL-MCNC: 10.2 MG/DL (ref 8.5–10.1)
CHLORIDE SERPL-SCNC: 100 MMOL/L (ref 94–109)
CO2 SERPL-SCNC: 28 MMOL/L (ref 20–32)
CREAT SERPL-MCNC: 0.58 MG/DL (ref 0.52–1.04)
GFR SERPL CREATININE-BSD FRML MDRD: >90 ML/MIN/1.7M2
GLUCOSE SERPL-MCNC: 95 MG/DL (ref 70–99)
PHOSPHATE SERPL-MCNC: 2.7 MG/DL (ref 2.5–4.5)
POTASSIUM SERPL-SCNC: 4 MMOL/L (ref 3.4–5.3)
PTH-INTACT SERPL-MCNC: 120 PG/ML (ref 12–72)
SODIUM SERPL-SCNC: 137 MMOL/L (ref 133–144)

## 2017-09-11 PROCEDURE — 80069 RENAL FUNCTION PANEL: CPT | Performed by: FAMILY MEDICINE

## 2017-09-11 PROCEDURE — 82306 VITAMIN D 25 HYDROXY: CPT | Performed by: FAMILY MEDICINE

## 2017-09-11 PROCEDURE — 83970 ASSAY OF PARATHORMONE: CPT | Performed by: FAMILY MEDICINE

## 2017-09-11 PROCEDURE — G0008 ADMIN INFLUENZA VIRUS VAC: HCPCS | Performed by: FAMILY MEDICINE

## 2017-09-11 PROCEDURE — 36415 COLL VENOUS BLD VENIPUNCTURE: CPT | Performed by: FAMILY MEDICINE

## 2017-09-11 PROCEDURE — 99213 OFFICE O/P EST LOW 20 MIN: CPT | Mod: 25 | Performed by: FAMILY MEDICINE

## 2017-09-11 PROCEDURE — 90662 IIV NO PRSV INCREASED AG IM: CPT | Performed by: FAMILY MEDICINE

## 2017-09-11 NOTE — NURSING NOTE
"Chief Complaint   Patient presents with     RECHECK     on biopsy of the left breast area        Initial /76 (BP Location: Left arm, Patient Position: Chair, Cuff Size: Adult Regular)  Pulse 75  Temp 97.3  F (36.3  C) (Oral)  Resp 16  Ht 5' 5\" (1.651 m)  Wt 173 lb 6.4 oz (78.7 kg)  SpO2 99%  Breastfeeding? No  BMI 28.86 kg/m2 Estimated body mass index is 28.86 kg/(m^2) as calculated from the following:    Height as of this encounter: 5' 5\" (1.651 m).    Weight as of this encounter: 173 lb 6.4 oz (78.7 kg).  Medication Reconciliation: complete     Paco Alexander      "

## 2017-09-11 NOTE — MR AVS SNAPSHOT
After Visit Summary   9/11/2017    Tracie Feliciano    MRN: [id number]           Patient Information     Date Of Birth          1945        Visit Information        Provider Department      9/11/2017 8:40 AM Melanie Patel MD [facility]        Today's Diagnoses     Breast neoplasm, Tis (LCIS), left    -  1    Hypertension goal BP (blood pressure) < 150/90        Hypercalcemia        Need for prophylactic vaccination and inoculation against influenza          Care Instructions    [facility]    If you have any questions regarding to your visit please contact your care team:       Team Red:   Clinic Hours Telephone Number   Dr. Melanie Victor, NP   7am-7pm  Monday - Thursday   7am-5pm  Fridays  [phone]  (Appointment scheduling available 24/7)    Questions about your visit?   Team Line  [phone]   [facility] [place] [facility] 11am-9pm Monday-Friday Saturday-Sunday- 9am-5pm   [place] - 5pm-9pm Monday-Friday Saturday-Sunday- 9am-5pm  [phone] - [facility]  [phone] - [place]       What options do I have for visits at the clinic other than the traditional office visit?  To expand how we care for you, many of our providers are utilizing electronic visits (e-visits) and telephone visits, when medically appropriate, for interactions with their patients rather than a visit in the clinic.   We also offer nurse visits for many medical concerns. Just like any other service, we will bill your insurance company for this type of visit based on time spent on the phone with your provider. Not all insurance companies cover these visits. Please check with your medical insurance if this type of visit is covered. You will be responsible for any charges that are not paid by your insurance.      E-visits via Met[employer]ia:  generally incur a $35.00 fee.  Telephone visits:  Time spent on the phone:  *charged based on time that is spent on the phone in increments of 10 minutes. Estimated cost:   5-10 mins $30.00   11-20 mins. $59.00   21-30 mins. $85.00     Use [employer] (secure email communication and access to your chart) to send your primary care provider a message or make an appointment. Ask someone on your Team how to sign up for [employer].  For a Price Quote for your services, please call our Consumer Price Line at [phone].      As always, Thank you for trusting us with your health care needs!    Discharged by Jazmyn Benitez MA.            Follow-ups after your visit        Additional Services     ONC/HEME ADULT REFERRAL       Your provider has referred you to: [facility]: [facility] - [phone]   [email or web address]/  Dr. Cheyenne Antonio     Please be aware that coverage of these services is subject to the terms and limitations of your health insurance plan.  Call member services at your health plan with any benefit or coverage questions.      Please bring the following with you to your appointment:    (1) Any X-Rays, CTs or MRIs which have been performed.  Contact the facility where they were done to arrange for  prior to your scheduled appointment.   (2) List of current medications  (3) This referral request   (4) Any documents/labs given to you for this referral                  Follow-up notes from your care team     Return in about 10 months (around 7/17/2018), or if symptoms worsen or fail to improve, for Wellness visit (fasting labs up to one week prior).      Who to contact     If you have questions or need follow up information about today's clinic visit or your schedule please contact [facility] directly at [phone].  Normal or non-critical lab and imaging results will be communicated to you by MyChart, letter or phone within 4 business days after the clinic has received the  "results. If you do not hear from us within 7 days, please contact the clinic through [employer] or phone. If you have a critical or abnormal lab result, we will notify you by phone as soon as possible.  Submit refill requests through [employer] or call your pharmacy and they will forward the refill request to us. Please allow 3 business days for your refill to be completed.          Additional Information About Your Visit        [employer] Information     [employer] lets you send messages to your doctor, view your test results, renew your prescriptions, schedule appointments and more. To sign up, go to [email or web address] . Click on \"Log in\" on the left side of the screen, which will take you to the Welcome page. Then click on \"Sign up Now\" on the right side of the page.     You will be asked to enter the access code listed below, as well as some personal information. Please follow the directions to create your username and password.     Your access code is: [email or web address]  Expires: 10/8/2017 10:32 AM     Your access code will  in 90 days. If you need help or a new code, please call your [place] clinic or [phone].        Care EveryWhere ID     This is your Care EveryWhere ID. This could be used by other organizations to access your [place] medical records  [id number]        Your Vitals Were     Pulse Temperature Respirations Height Pulse Oximetry Breastfeeding?    75 97.3  F (36.3  C) (Oral) 16 5' 5\" (1.651 m) 99% No    BMI (Body Mass Index)                   28.86 kg/m2            Blood Pressure from Last 3 Encounters:   17 112/76   17 140/84   07/10/17 139/83    Weight from Last 3 Encounters:   17 173 lb 6.4 oz (78.7 kg)   17 174 lb (78.9 kg)   07/10/17 176 lb 12.8 oz (80.2 kg)              We Performed the Following     ONC/HEME ADULT REFERRAL        Primary Care Provider Office Phone # Fax #    Melanie Patel [phone]       [address] [place] " [id number]        Equal Access to Services     [facility]: Hadii aad ku hadariellegage Shadyali, waclaudioda luqrickyha, qaaureliota michaeldenisemanisha araujo. So [facility] [phone].    ATENCIÓN: Si habla español, tiene a barbosa disposición servicios gratuitos de asistencia lingüística. Llame al [phone].    We comply with applicable federal civil rights laws and [place] laws. We do not discriminate on the basis of race, color, national origin, age, disability sex, sexual orientation or gender identity.            Thank you!     Thank you for choosing [facility]  for your care. Our goal is always to provide you with excellent care. Hearing back from our patients is one way we can continue to improve our services. Please take a few minutes to complete the written survey that you may receive in the mail after your visit with us. Thank you!             Your Updated Medication List - Protect others around you: Learn how to safely use, store and throw away your medicines at www.disposemymeds.org.          This list is accurate as of: 9/11/17  9:22 AM.  Always use your most recent med list.                   Brand Name Dispense Instructions for use Diagnosis    losartan-hydrochlorothiazide 50-12.5 MG per tablet    HYZAAR    90 tablet    Take 1 tablet by mouth daily    Hypertension goal BP (blood pressure) < 150/90       simvastatin 40 MG tablet    ZOCOR    90 tablet    Take 1 tablet (40 mg) by mouth daily    Hyperlipidemia with target LDL less than 130       vitamin D 2000 UNITS tablet      Take 2,000 Units by mouth daily

## 2017-09-11 NOTE — PROGRESS NOTES
Injectable Influenza Immunization Documentation    1.  Are you sick today? (Fever of 100.5 or higher on the day of the clinic)   No    2.  Have you ever had Guillain-[place] Syndrome within 6 weeks of an influenza vaccionation?  No    3. Do you have a life-threatening allergy to eggs?  No    4. Do you have a life-threatening allergy to a component of the vaccine? May include antibiotics, gelatin or latex.  No     5. Have you ever had a reaction to a dose of flu vaccine that needed immediate medical attention?  No     Form completed by Jazmyn Benitez MA

## 2017-09-12 ENCOUNTER — TELEPHONE (OUTPATIENT)
Dept: FAMILY MEDICINE | Facility: CLINIC | Age: 72
End: 2017-09-12

## 2017-09-12 LAB — DEPRECATED CALCIDIOL+CALCIFEROL SERPL-MC: 30 UG/L (ref 20–75)

## 2017-09-12 NOTE — LETTER
[facility]  [address] [place] [address]. [place] [address]    September 13, 2017    Tracie Feliciano  [address] [facility] [address]      Dear Tracie,    You have high parathyroid hormone, called hyperparathyroidism. Over your lifetime, this can increase risk for osteoporosis (weak bones) and kidney stones. See our endocrinologist sometime in the next 6 months. Your provider has referred you to: [facility]: [facility] [phone]   [email or web address]/Clinics/[facility]/.    Enclosed is a copy of your results.  Results for orders placed or performed in visit on 09/11/17   Renal panel   Result Value Ref Range    Sodium 137 133 - 144 mmol/L    Potassium 4.0 3.4 - 5.3 mmol/L    Chloride 100 94 - 109 mmol/L    Carbon Dioxide 28 20 - 32 mmol/L    Anion Gap 9 3 - 14 mmol/L    Glucose 95 70 - 99 mg/dL    Urea Nitrogen 12 7 - 30 mg/dL    Creatinine 0.58 0.52 - 1.04 mg/dL    GFR Estimate >90 >60 mL/min/1.7m2    GFR Estimate If Black >90 >60 mL/min/1.7m2    Calcium 10.2 (H) 8.5 - 10.1 mg/dL    Phosphorus 2.7 2.5 - 4.5 mg/dL    Albumin 3.8 3.4 - 5.0 g/dL   Vitamin D Deficiency   Result Value Ref Range    Vitamin D Deficiency screening 30 20 - 75 ug/L   Parathyroid Hormone Intact   Result Value Ref Range    Parathyroid Hormone Intact 120 (H) 12 - 72 pg/mL   If you have any questions or concerns, please call myself or my nurse at [phone].      Sincerely,      Melanie Patel MD/dt

## 2017-09-12 NOTE — PROGRESS NOTES
Please call patient:  You have high parathyroid hormone, called hyperparathyroidism. Over your lifetime, this can increase risk for osteoporosis (weak bones) and kidney stones. See our endocrinologist sometime in the next 6 months. Your provider has referred you to: [facility]: [facility] [phone]   [email or web address]/Clinics/[facility]/.  Melanie Patel MD

## 2017-09-12 NOTE — TELEPHONE ENCOUNTER
Patient requesting for the test result on 9/11/17 to be mailed out to her.  Address is [facility].  Please mail out letter.    Jennifer PALMER RN, BSN

## 2017-10-03 ENCOUNTER — TRANSFERRED RECORDS (OUTPATIENT)
Dept: HEALTH INFORMATION MANAGEMENT | Facility: CLINIC | Age: 72
End: 2017-10-03

## 2017-10-04 NOTE — PATIENT INSTRUCTIONS
[facility]    If you have any questions regarding to your visit please contact your care team:       Team Red:   Clinic Hours Telephone Number   Dr. Melanie Dc  (pediatrics)  Jaci Victor NP 7am-7pm  Monday - Thursday   7am-5pm  Fridays  [phone] (fax)    Jennifer ANDUJAR  [phone]   Urgent Care - [place] and [place] Monday-Friday  [place] - 11am-8pm  Saturday-Sunday  Both sites - 9am-5pm  [phone] - [facility]  [phone] - [place]       What options do I have for visits at the clinic other than the traditional office visit?  To expand how we care for you, many of our providers are utilizing electronic visits (e-visits) and telephone visits, when medically appropriate, for interactions with their patients rather than a visit in the clinic.   We also offer nurse visits for many medical concerns. Just like any other service, we will bill your insurance company for this type of visit based on time spent on the phone with your provider. Not all insurance companies cover these visits. Please check with your medical insurance if this type of visit is covered. You will be responsible for any charges that are not paid by your insurance.      E-visits via [employer]:  generally incur a $35.00 fee.  Telephone visits:  Time spent on the phone: *charged based on time that is spent on the phone in increments of 10 minutes. Estimated cost:   5-10 mins $30.00   11-20 mins. $59.00   21-30 mins. $85.00     As always, Thank you for trusting us with your health care needs!              Before Your Surgery      Call your surgeon if there is any change in your health. This includes signs of a cold or flu (such as a sore throat, runny nose, cough, rash or fever).    Do not smoke, drink alcohol or take over the counter medicine (unless your surgeon or primary care doctor tells you to) for the 24 hours before and after surgery.    If you take  prescribed drugs: Follow your doctor s orders about which medicines to take and which to stop until after surgery.    Eating and drinking prior to surgery: follow the instructions from your surgeon    Take a shower or bath the night before surgery. Use the soap your surgeon gave you to gently clean your skin. If you do not have soap from your surgeon, use your regular soap. Do not shave or scrub the surgery site.  Wear clean pajamas and have clean sheets on your bed.     Discharged by Jazmyn Benitez MA.

## 2017-10-04 NOTE — PROGRESS NOTES
[facility]  [address] [place] [address]  [place] [address]  [phone]  Dept: [phone]    PRE-OP EVALUATION:  Today's date: 10/9/2017    Tracie Feliciano (: 1945) presents for pre-operative evaluation assessment as requested by Dr. Luis Alberto Loya.  She requires evaluation and anesthesia risk assessment prior to undergoing surgery/procedure for treatment of Cataracts.  Proposed procedure: Removal of Cataracts.    Date of Surgery/ Procedure: 10/24/2017, 2017  Time of Surgery/ Procedure: [facility]/Surgical Facility: [facility]  Fax number for surgical facility: [phone]  Primary Physician: Melanie Patel  Type of Anesthesia Anticipated: to be determined    Patient has a Health Care Directive or Living Will:  NO    1. NO - Do you have a history of heart attack, stroke, stent, bypass or surgery on an artery in the head, neck, heart or legs?  2. NO - Do you ever have any pain or discomfort in your chest?  3. NO - Do you have a history of  Heart Failure?  4. NO - Are you troubled by shortness of breath when: walking on the level, up a slight hill or at night?  5. NO - Do you currently have a cold, bronchitis or other respiratory infection?  6. NO - Do you have a cough, shortness of breath or wheezing?  7. NO - Do you sometimes get pains in the calves of your legs when you walk?  8. NO - Do you or anyone in your family have previous history of blood clots?  9. NO - Do you or does anyone in your family have a serious bleeding problem such as prolonged bleeding following surgeries or cuts?  10. NO - Have you ever had problems with anemia or been told to take iron pills?  11. NO - Have you had any abnormal blood loss such as black, tarry or bloody stools, or abnormal vaginal bleeding?  12. NO - Have you ever had a blood transfusion?  13. NO - Have you or any of your relatives ever had problems with anesthesia?  14. YES - DO YOU HAVE SLEEP APNEA, EXCESSIVE SNORING OR DAYTIME  DROWSINESS?    15. NO - Do you have any prosthetic heart valves?  16. NO - Do you have prosthetic joints?  17. NO - Is there any chance that you may be pregnant?    HPI:                                                    Tracie Feliciano is a 72 year old female who presents with painless, progressive vision loss. Symptom onset has been gradual, worsening for a time period of months. Severity is described as moderate, generalized and constant.  Course of her symptoms over time is worsening.      See problem list for active medical problems.  Problems all longstanding and stable, except as noted/documented.  See ROS for pertinent symptoms related to these conditions.                                                                                                  .    MEDICAL HISTORY:                                                    Patient Active Problem List    Diagnosis Date Noted     Hyperparathyroidism (H)      Priority: High     Breast neoplasm, Tis (LCIS), left      Priority: High     Hypertension goal BP (blood pressure) < 150/90 11/19/2010     Priority: High     Vitamin D deficiency      Priority: Medium     Osteopenia      Priority: Medium     Hyperlipidemia with target LDL less than 130      Priority: Medium     Pravastatin 80 ineffective       Venous (peripheral) insufficiency 06/06/2016     Priority: Low     Cataract      Priority: Low     Advanced directives, counseling/discussion 07/05/2011     Priority: Low     Advance Directive Problem List Overview:   Name Relationship Phone    Primary Health Care Agent            Alternative Health Care Agent          Discussed advance care planning with patient; information given to patient to review. 7/5/2011          Allergic rhinitis      Priority: Low     Allergist, history of allergy injections, nasal sprays        Past Medical History:   Diagnosis Date     Allergic rhinitis     allergy consultation     Breast neoplasm, Tis (LCIS), left      Cataract       Colon adenomas 6/25/2007     EUGENIE (generalized anxiety disorder)      HTN (hypertension)      Hyperlipidemia LDL goal < 130      Hyperparathyroidism (H)      Impaired fasting glucose 5/21/2013     LFT's abnormal     elevated GGT, resolved     Osteopenia      Venous (peripheral) insufficiency 6/6/2016     Vitamin D deficiency      Past Surgical History:   Procedure Laterality Date     C/SECTION, LOW TRANSVERSE  4/2/80     COLONOSCOPY  6/21/2012    Procedure: COLONOSCOPY;  COLONOSCOPY, SCREEN, PREVIOUS POLYP;  Surgeon: Maverick Maradiaga MD;  Location: [facility] OR     HYSTERECTOMY, PAP NO LONGER INDICATED  9/30/09    BSO      Current Outpatient Prescriptions   Medication Sig Dispense Refill     timolol (TIMOPTIC) 0.5 % ophthalmic solution PLACE 1 DROP INTO THE LEFT EYE QAM  3     losartan-hydrochlorothiazide (HYZAAR) 50-12.5 MG per tablet Take 1 tablet by mouth daily 90 tablet 3     simvastatin (ZOCOR) 40 MG tablet Take 1 tablet (40 mg) by mouth daily 90 tablet 3     Cholecalciferol (VITAMIN D) 2000 UNITS tablet Take 2,000 Units by mouth daily       OTC products: None, except as noted above    Allergies   Allergen Reactions     Aspirin      Reactive gastropathy     Contrast Dye      sneezing     Lisinopril Cough      Latex Allergy: NO    Social History   Substance Use Topics     Smoking status: Former Smoker     Packs/day: 0.50     Years: 1.00     Types: Cigarettes     Quit date: 1/1/1967     Smokeless tobacco: Never Used     Alcohol use 1.5 oz/week     3 Standard drinks or equivalent per week      Comment: wine     History   Drug Use No     Social History     Social History     Marital status:      Spouse name: Bro     Number of children: 2     Years of education: 14     Occupational History     [employer] customer service Retired     Social History Main Topics     Smoking status: Former Smoker     Packs/day: 0.50     Years: 1.00     Types: Cigarettes     Quit date: 1/1/1967     Smokeless tobacco: Never Used  "    Alcohol use 1.5 oz/week     3 Standard drinks or equivalent per week      Comment: wine     Drug use: No     Sexual activity: Yes     Partners: Male     Birth control/ protection: Surgical, Post-menopausal     Other Topics Concern     Not on file     Social History Narrative     Family History   Problem Relation Age of Onset     CANCER Mother      d. pelvic     C.A.D. Mother      ?     C.A.D. Father 79     MI, d.     Alzheimer Disease Father      GASTROINTESTINAL DISEASE Father      PUD     CANCER Maternal Aunt      GI?     DIABETES No family hx of       REVIEW OF SYSTEMS:                                                    C: NEGATIVE for fever, chills, change in weight  I: NEGATIVE for worrisome rashes, moles or lesions  EYES: see HPI   E/M: NEGATIVE for ear, mouth and throat problems  R: NEGATIVE for significant cough or SOB  B: NEGATIVE for masses, tenderness or discharge  CV: NEGATIVE for chest pain, palpitations or peripheral edema  GI: NEGATIVE for nausea, abdominal pain, heartburn, or change in bowel habits  : NEGATIVE for frequency, dysuria, or hematuria  M: NEGATIVE for significant arthralgias or myalgia  N: NEGATIVE for weakness, dizziness or paresthesias  E: NEGATIVE for temperature intolerance, skin/hair changes  H: NEGATIVE for bleeding problems  P: NEGATIVE for changes in mood or affect  Complete ROS otherwise negative.     EXAM:                                                    /84 (BP Location: Right arm, Patient Position: Chair, Cuff Size: Adult Regular)  Pulse 64  Temp 97.1  F (36.2  C) (Oral)  Ht 5' 5\" (1.651 m)  Wt 173 lb (78.5 kg)  SpO2 99%  BMI 28.79 kg/m2    GENERAL APPEARANCE: healthy, alert and no distress     EYES: EOMI, PERRL     HENT: ear canals and TM's normal and nose and mouth without ulcers or lesions     NECK: no adenopathy, no asymmetry, masses, or scars and thyroid normal to palpation     RESP: lungs clear to auscultation - no rales, rhonchi or wheezes     CV: " regular rates and rhythm, normal S1 S2, no S3 or S4 and no murmur, click or rub     ABDOMEN:  soft, nontender, no HSM or masses and bowel sounds normal     MS: extremities normal- no gross deformities noted, no evidence of inflammation in joints, FROM in all extremities.     SKIN: no suspicious lesions or rashes     NEURO: Normal strength and tone, sensory exam grossly normal, mentation intact and speech normal     PSYCH: mentation appears normal. and affect normal/bright     LYMPHATICS: No axillary, cervical, or supraclavicular nodes    DIAGNOSTICS:                                                    No labs or EKG required for low risk surgery (cataract, skin procedure, breast biopsy, etc)    Recent Labs   Lab Test  09/11/17   0931  08/14/17   1452   05/31/16   1023   05/20/13   1154   HGB   --   12.9   --   14.4   < >  Canceled, Test credited CORRECTED ON 05/20 AT 2036: PREVIOUSLY REPORTED AS Duplicate request CORRECTED  ON 05/20 AT 2034: PREVIOUSLY REPORTED AS 9.1  9.1*   PLT   --    --    --   176   --   208   NA  137  136   < >  136   < >  137   POTASSIUM  4.0  3.6   < >  4.1   < >  4.2   CR  0.58  0.58   < >  0.60   < >  0.58    < > = values in this interval not displayed.        IMPRESSION:                                                    Reason for surgery/procedure: cataract   Diagnosis/reason for consult: hypertension     The proposed surgical procedure is considered LOW risk.    REVISED CARDIAC RISK INDEX  The patient has the following serious cardiovascular risks for perioperative complications such as (MI, PE, VFib and 3  AV Block):  No serious cardiac risks  INTERPRETATION: 0 risks: Class I (very low risk - 0.4% complication rate)    The patient has the following additional risks for perioperative complications:  No identified additional risks      ICD-10-CM    1. Preop general physical exam Z01.818    2. Cataract, unspecified cataract type, unspecified laterality H26.9    3. Hypertension goal BP  (blood pressure) < 150/90 I10    4. Hyperparathyroidism (H) E21.3    5. Asymptomatic postmenopausal status Z78.0 DEXA HIP/PELVIS/SPINE - Future       RECOMMENDATIONS:                                                    --Patient is to take all scheduled medications on the day of surgery EXCEPT for modifications listed below.    APPROVAL GIVEN to proceed with proposed procedure, without further diagnostic evaluation       Signed Electronically by: Melanie Patel MD    Copy of this evaluation report is provided to requesting physician.    [place] Preop Guidelines

## 2017-10-09 ENCOUNTER — OFFICE VISIT (OUTPATIENT)
Dept: FAMILY MEDICINE | Facility: CLINIC | Age: 72
End: 2017-10-09
Payer: MEDICARE

## 2017-10-09 VITALS
OXYGEN SATURATION: 99 % | HEART RATE: 64 BPM | DIASTOLIC BLOOD PRESSURE: 84 MMHG | HEIGHT: 65 IN | TEMPERATURE: 97.1 F | BODY MASS INDEX: 28.82 KG/M2 | SYSTOLIC BLOOD PRESSURE: 136 MMHG | WEIGHT: 173 LBS

## 2017-10-09 DIAGNOSIS — E21.3 HYPERPARATHYROIDISM (H): ICD-10-CM

## 2017-10-09 DIAGNOSIS — I10 HYPERTENSION GOAL BP (BLOOD PRESSURE) < 150/90: ICD-10-CM

## 2017-10-09 DIAGNOSIS — Z78.0 ASYMPTOMATIC POSTMENOPAUSAL STATUS: ICD-10-CM

## 2017-10-09 DIAGNOSIS — Z01.818 PREOP GENERAL PHYSICAL EXAM: Primary | ICD-10-CM

## 2017-10-09 DIAGNOSIS — H26.9 CATARACT, UNSPECIFIED CATARACT TYPE, UNSPECIFIED LATERALITY: ICD-10-CM

## 2017-10-09 PROCEDURE — 99214 OFFICE O/P EST MOD 30 MIN: CPT | Performed by: FAMILY MEDICINE

## 2017-10-09 RX ORDER — TIMOLOL MALEATE 5 MG/ML
SOLUTION/ DROPS OPHTHALMIC
Refills: 3 | COMMUNITY
Start: 2017-09-19

## 2017-10-09 NOTE — Clinical Note
Please fax to [facility] Fax number for surgical facility: [phone]  with labs, EKG, and xray reports if done. Melanie Patel MD

## 2017-10-09 NOTE — NURSING NOTE
"Chief Complaint   Patient presents with     Pre-Op Exam       Initial /84 (BP Location: Right arm, Patient Position: Chair, Cuff Size: Adult Regular)  Pulse 64  Temp 97.1  F (36.2  C) (Oral)  Ht 5' 5\" (1.651 m)  Wt 173 lb (78.5 kg)  SpO2 99%  BMI 28.79 kg/m2 Estimated body mass index is 28.79 kg/(m^2) as calculated from the following:    Height as of this encounter: 5' 5\" (1.651 m).    Weight as of this encounter: 173 lb (78.5 kg).  Medication Reconciliation: complete    "

## 2017-12-01 ENCOUNTER — RADIANT APPOINTMENT (OUTPATIENT)
Dept: BONE DENSITY | Facility: CLINIC | Age: 72
End: 2017-12-01
Attending: FAMILY MEDICINE
Payer: MEDICARE

## 2017-12-01 DIAGNOSIS — Z78.0 ASYMPTOMATIC POSTMENOPAUSAL STATUS: ICD-10-CM

## 2017-12-01 PROCEDURE — 77080 DXA BONE DENSITY AXIAL: CPT | Performed by: INTERNAL MEDICINE

## 2017-12-01 NOTE — LETTER
[address]  [place] [address]    2017    Tracie Feliciano  [address] [facility] [address]          Dear Tracie,    Your bone mass has decreased some at the hip, but you do not have osteoporosis. Your bone mass is mildly low, called osteopenia. We can continue to follow this by repeating the DEXA test in 2 to 5 years.  Get several servings of dairy daily (or calcium 1000 - 1200 mg daily split into 2 doses), take vitamin D 1000 units daily over-the-counter, exercise regularly, and avoid falls.    Enclosed is a copy of your results.     Results for orders placed or performed in visit on 17   DEXA HIP/PELVIS/SPINE - Future    Narrative    BONE DENSITOMETRY  [address]  [place] [address]  2017      PATIENT: Tracie Feliciano  CHART: [id number]   :  1945  AGE:  72 year old  SEX:  female   REFERRING PROVIDER:  Melanie Patel MD     PROCEDURE:  Bone density scanning was performed using DXA technology of   the lumbar spine and hip.  Scanning was performed on a Lunar Prodigy   scanner.  Reporting is completed in the form of a T-score.  The T-score   represents the standard deviation from peak bone mass based on a young   healthy adult.     REFERENCE T-SCORES:       Normal                -1.0 and greater                                 Osteopenia         Between -1.0 and -2.5                                             Osteoporosis     -2.5 and less                                       RISK FACTORS:  Post-menopausal    CURRENT TREATMENT:  none listed     FINDINGS:               Lumbar Spine (L1-L2) T-score: -0.9, marked degenerative   changes present at L3,4, so only L1,2 are evaluated.                Left Femoral Neck T-score:  -1.9               Right Femoral Neck T-score:  -1.4                              Lumbar (L1-L2) BMD: 1.057             "Previous:   1.094                                              Total Hip Mean BMD: 0.865            Previous:   0.907     Comparison is made to another DXA performed on the same Lunar Prodigy    machine on 05/01/2014.      IMPRESSION  Osteopenia., Degenerative changes of the lumbar spine which may falsely   elevate results.    There has been no significant change in bone density of the lumbar spine.   There has been a trend toward significant decrease in bone density of the   hip(s).    Recommendations include ensuring adequate Calcium and Vitamin D.    The current NOF Guidelines recommend treatment for patients with prior hip   or vertebral fracture, T-score -2.5 or below, or 10 year risk of any major   osteoporotic fracture >20% or 10 year risk of hip fracture >3%, as   calculated using the FRAX calculator (www.shef.ac.uk/FRAX or you can   google \"FRAX\").      This patient's risks based on available information, with the use of FRAX,   are 11 % for major osteoporotic fracture and 2.1 % for hip fracture.   Based on these guidelines, treatment (in addition to calcium and vitamin   D) is not recommended for this patient, after ruling out other causes of   osteoporosis.  This is meant as an aid to clinical decision making; one must still use   clinical judgement.      Follow up can be considered in 2 years.   ___________________  Ruth Pickering M.D.  Electronically signed          If you have any questions or concerns, please call myself or my nurse at [phone].      Sincerely,        Melanie Patel MD /garcia  "

## 2017-12-04 NOTE — PROGRESS NOTES
Mail letter:  Your bone mass has decreased some at the hip, but you do not have osteoporosis. Your bone mass is mildly low, called osteopenia. We can continue to follow this by repeating the DEXA test in 2 to 5 years.  Get several servings of dairy daily (or calcium 1000 - 1200 mg daily split into 2 doses), take vitamin D 1000 units daily over-the-counter, exercise regularly, and avoid falls.      Melanie Patel MD

## 2017-12-10 ENCOUNTER — TRANSFERRED RECORDS (OUTPATIENT)
Dept: HEALTH INFORMATION MANAGEMENT | Facility: CLINIC | Age: 72
End: 2017-12-10

## 2017-12-12 ENCOUNTER — TELEPHONE (OUTPATIENT)
Dept: FAMILY MEDICINE | Facility: CLINIC | Age: 72
End: 2017-12-12

## 2017-12-12 NOTE — PATIENT INSTRUCTIONS
[facility]    If you have any questions regarding to your visit please contact your care team:       Team Red:   Clinic Hours Telephone Number   Dr. Melanie Dc  (pediatrics)  Jaci Victor NP 7am-7pm  Monday - Thursday   7am-5pm  Fridays  [phone] (fax)    Jennifer ANDUJAR  [phone]   Urgent Care - [place] and [place] Monday-Friday  [place] - 11am-8pm  Saturday-Sunday  Both sites - 9am-5pm  [phone] - [facility]  [phone] - [place]       What options do I have for visits at the clinic other than the traditional office visit?  To expand how we care for you, many of our providers are utilizing electronic visits (e-visits) and telephone visits, when medically appropriate, for interactions with their patients rather than a visit in the clinic.   We also offer nurse visits for many medical concerns. Just like any other service, we will bill your insurance company for this type of visit based on time spent on the phone with your provider. Not all insurance companies cover these visits. Please check with your medical insurance if this type of visit is covered. You will be responsible for any charges that are not paid by your insurance.      E-visits via [employer]:  generally incur a $35.00 fee.  Telephone visits:  Time spent on the phone: *charged based on time that is spent on the phone in increments of 10 minutes. Estimated cost:   5-10 mins $30.00   11-20 mins. $59.00   21-30 mins. $85.00     As always, Thank you for trusting us with your health care needs!      Please repeat Chest Xray 6 weeks  Follow up if not better  Take care  Oumou Ledesma MD              Discharge MARQUIS Fallon CMA

## 2017-12-12 NOTE — TELEPHONE ENCOUNTER
Attempt #1 Home phone     Received busy signal and unable to leave message.    Attempt #1 Cell phone    Rung straight to voicemail- per demographics, patient does not want VM on cell phone.     Will try again   Sandra Gonzalez RN

## 2017-12-12 NOTE — TELEPHONE ENCOUNTER
This patient was discharged from [facility] on 12/11/2017.    Discharge Diagnosis:Acute influenza A with right lower Lobe pneumonia, Hypertension, Leukopenia and thrombocytopenia     A follow-up visit has been scheduled.   Next 5 appointments (look out 90 days)     Dec 13, 2017  8:40 AM CST   SHORT with Oumou Ledesma MD   [facility]    [address] [facility] [address]   [phone]              Number of ED/ER visits in the last 12 months:       Please follow-up with patient.    Rain Green,

## 2017-12-12 NOTE — PROGRESS NOTES
SUBJECTIVE:   Tracie Feliciano is a 72 year old female who presents to clinic today for the following health issues:          Hospital Follow-up Visit:    Hospital/Nursing Home/ Rehab Facility: [facility]  Date of Admission: 12-10-17  Date of Discharge: 12-11-17  Reason(s) for Admission: Influenza / pneumonia     1. Acute influenza A with right lower lobe pneumonia.  2. Hypertension.  3. Leukopenia and thrombocytopenia, probably secondary to viral infection         Problems taking medications regularly:  None       Medication changes since discharge: yes       Problems adhering to non-medication therapy:  None    Summary of hospitalization:  CareEverywhere information obtained and reviewed  Diagnostic Tests/Treatments reviewed.  Follow up needed: CXR  Other Healthcare Providers Involved in Patient s Care:         None  Update since discharge: improved.     Post Discharge Medication Reconciliation: discharge medications reconciled, continue medications without change.  Plan of care communicated with patient     Coding guidelines for this visit:  Type of Medical   Decision Making Face-to-Face Visit       within 7 Days of discharge Face-to-Face Visit        within 14 days of discharge   Moderate Complexity [id number]   High Complexity 27126 82974                  Problem list and histories reviewed & adjusted, as indicated.  Additional history: as documented    Patient Active Problem List   Diagnosis     Allergic rhinitis     Hyperlipidemia with target LDL less than 130     Hypertension goal BP (blood pressure) < 150/90     Osteopenia     Advanced directives, counseling/discussion     Cataract     Vitamin D deficiency     Venous (peripheral) insufficiency     Breast neoplasm, Tis (LCIS), left     Hyperparathyroidism (H)     Past Surgical History:   Procedure Laterality Date     C/SECTION, LOW TRANSVERSE  4/2/80     COLONOSCOPY  6/21/2012    Procedure: COLONOSCOPY;  COLONOSCOPY, SCREEN, PREVIOUS POLYP;  Surgeon:  Maverick Maradiaga MD;  Location: [facility] OR     HYSTERECTOMY, PAP NO LONGER INDICATED  9/30/09    BSO        Social History   Substance Use Topics     Smoking status: Former Smoker     Packs/day: 0.50     Years: 1.00     Types: Cigarettes     Quit date: 1/1/1967     Smokeless tobacco: Never Used     Alcohol use 1.5 oz/week     3 Standard drinks or equivalent per week      Comment: wine     Family History   Problem Relation Age of Onset     CANCER Mother      d. pelvic     C.A.D. Mother      ?     C.A.D. Father 79     MI, d.     Alzheimer Disease Father      GASTROINTESTINAL DISEASE Father      PUD     CANCER Maternal Aunt      GI?     DIABETES No family hx of          Current Outpatient Prescriptions   Medication Sig Dispense Refill     tamoxifen (NOLVADEX) 20 MG tablet Take 20 mg by mouth daily       oseltamivir (TAMIFLU) 75 MG capsule Take 75 mg by mouth 2 times daily       azithromycin (ZITHROMAX) 500 MG tablet Take 500 mg by mouth daily       timolol (TIMOPTIC) 0.5 % ophthalmic solution PLACE 1 DROP INTO THE LEFT EYE QAM  3     losartan-hydrochlorothiazide (HYZAAR) 50-12.5 MG per tablet Take 1 tablet by mouth daily 90 tablet 3     simvastatin (ZOCOR) 40 MG tablet Take 1 tablet (40 mg) by mouth daily 90 tablet 3     Cholecalciferol (VITAMIN D) 2000 UNITS tablet Take 2,000 Units by mouth daily       Allergies   Allergen Reactions     Aspirin      Reactive gastropathy     Contrast Dye      sneezing     Lisinopril Cough     Recent Labs   Lab Test  09/11/17   0931  08/14/17   1452  07/17/17   1013  05/31/16   1023  04/21/15   1509  05/01/14   1239   05/20/13   1154   09/09/11   0958  05/04/11   1253   LDL   --    --   81  110*  74  78   --   62   < >  81  70   HDL   --    --   56   --   63  48*   --   49*   < >  54  57   TRIG   --    --   144   --   158*  95   --   179*   < >  101  170*   ALT   --    --    --   36   --    --    --    --    --   22  22   CR  0.58  0.58  0.54  0.60  0.56  0.61   < >  0.58   < >   --    "0.60   GFRESTIMATED  >90  >90  Non African American GFR Calc    >90  Non  GFR Calc    >90  Non  GFR Calc    >90  Non  GFR Calc    >90   < >  >90   < >   --   >90   GFRESTBLACK  >90  >90  African American GFR Calc    >90   GFR Calc    >90   GFR Calc    >90   GFR Calc    >90   < >  >90   < >   --   >90   POTASSIUM  4.0  3.6  3.7  4.1  3.5  4.0   < >  4.2   < >   --   3.9   TSH   --    --    --   1.83   --    --    --   2.16   --    --    --     < > = values in this interval not displayed.      BP Readings from Last 3 Encounters:   12/13/17 126/76   10/09/17 136/84   09/11/17 112/76    Wt Readings from Last 3 Encounters:   12/13/17 171 lb (77.6 kg)   10/09/17 173 lb (78.5 kg)   09/11/17 173 lb 6.4 oz (78.7 kg)                  Labs reviewed in EPIC          Reviewed and updated as needed this visit by clinical staff       Reviewed and updated as needed this visit by Provider         ROS:  C: NEGATIVE for fever, chills, change in weight  INTEGUMENTARY/SKIN: NEGATIVE for worrisome rashes, moles or lesions  E/M: NEGATIVE for ear, mouth and throat problems  RESP:cough better-Improving  CV: NEGATIVE for chest pain, palpitations or peripheral edema  MUSCULOSKELETAL: NEGATIVE for significant arthralgias or myalgia    OBJECTIVE:     /76  Pulse 68  Temp 97  F (36.1  C) (Oral)  Ht 5' 5\" (1.651 m)  Wt 171 lb (77.6 kg)  SpO2 98%  Breastfeeding? No  BMI 28.46 kg/m2  Body mass index is 28.46 kg/(m^2).  GENERAL: alert and no distress  EYES: Eyes grossly normal to inspection, PERRL and conjunctivae and sclerae normal  HENT: ear canals and TM's normal, nose and mouth without ulcers or lesions  NECK: no adenopathy, no asymmetry, masses, or scars and thyroid normal to palpation  RESP: lungs clear to auscultation - no rales, rhonchi or wheezes  CV: regular rate and rhythm, normal S1 S2, no S3 or S4, no murmur, click or rub, no " peripheral edema and peripheral pulses strong  ABDOMEN: soft, nontender, no hepatosplenomegaly, no masses and bowel sounds normal  MS: no gross musculoskeletal defects noted, no edema    Diagnostic Test Results:  none     ASSESSMENT/PLAN:         ICD-10-CM    1. Influenza A J10.1    2. Pneumonia of right lower lobe due to infectious organism (H) J18.1    3. Hypertension goal BP (blood pressure) < 150/90 I10      Pt doing better  Advised finish Tamiflu and zithromax  Follow up 6 weeks Repeat Chest Xray/sooner if worse  Oumou Ledesma MD  [facility]

## 2017-12-13 ENCOUNTER — OFFICE VISIT (OUTPATIENT)
Dept: FAMILY MEDICINE | Facility: CLINIC | Age: 72
End: 2017-12-13
Payer: MEDICARE

## 2017-12-13 VITALS
OXYGEN SATURATION: 98 % | TEMPERATURE: 97 F | HEIGHT: 65 IN | BODY MASS INDEX: 28.49 KG/M2 | WEIGHT: 171 LBS | SYSTOLIC BLOOD PRESSURE: 126 MMHG | DIASTOLIC BLOOD PRESSURE: 76 MMHG | HEART RATE: 68 BPM

## 2017-12-13 DIAGNOSIS — J18.9 PNEUMONIA OF RIGHT LOWER LOBE DUE TO INFECTIOUS ORGANISM: ICD-10-CM

## 2017-12-13 DIAGNOSIS — J10.1 INFLUENZA A: Primary | ICD-10-CM

## 2017-12-13 DIAGNOSIS — I10 HYPERTENSION GOAL BP (BLOOD PRESSURE) < 150/90: ICD-10-CM

## 2017-12-13 PROCEDURE — 99213 OFFICE O/P EST LOW 20 MIN: CPT | Performed by: FAMILY MEDICINE

## 2017-12-13 RX ORDER — OSELTAMIVIR PHOSPHATE 75 MG/1
75 CAPSULE ORAL 2 TIMES DAILY
COMMUNITY
Start: 2017-12-11 | End: 2017-12-15

## 2017-12-13 RX ORDER — TAMOXIFEN CITRATE 20 MG/1
20 TABLET ORAL DAILY
COMMUNITY

## 2017-12-13 RX ORDER — AZITHROMYCIN 500 MG/1
500 TABLET, FILM COATED ORAL DAILY
COMMUNITY
Start: 2017-12-11 | End: 2017-12-16

## 2017-12-13 NOTE — MR AVS SNAPSHOT
After Visit Summary   12/13/2017    Tracie Feliciano    MRN: [id number]           Patient Information     Date Of Birth          1945        Visit Information        Provider Department      12/13/2017 8:40 AM Oumou Ledesma MD [facility] Instructions    [facility]    If you have any questions regarding to your visit please contact your care team:       Team Red:   Clinic Hours Telephone Number   Dr. Melanie Dc  (pediatrics)  Jaci Victor NP 7am-7pm  Monday - Thursday   7am-5pm  Fridays  [phone] (fax)    Jennifer ANDUJAR  [phone]   Urgent Care - [place] and [place] Monday-Friday  [place] - 11am-8pm  Saturday-Sunday  Both sites - 9am-5pm  [phone] - [facility]  [phone] - [place]       What options do I have for visits at the clinic other than the traditional office visit?  To expand how we care for you, many of our providers are utilizing electronic visits (e-visits) and telephone visits, when medically appropriate, for interactions with their patients rather than a visit in the clinic.   We also offer nurse visits for many medical concerns. Just like any other service, we will bill your insurance company for this type of visit based on time spent on the phone with your provider. Not all insurance companies cover these visits. Please check with your medical insurance if this type of visit is covered. You will be responsible for any charges that are not paid by your insurance.      E-visits via [employer]:  generally incur a $35.00 fee.  Telephone visits:  Time spent on the phone: *charged based on time that is spent on the phone in increments of 10 minutes. Estimated cost:   5-10 mins $30.00   11-20 mins. $59.00   21-30 mins. $85.00     As always, Thank you for trusting us with your health care needs!      Please repeat Chest Xray 6 weeks  Follow up if not better  Take  "kelin Ledesma MD              Discharge MARQUIS Fallon  [facility]            Follow-ups after your visit        Your next 10 appointments already scheduled     2018 11:15 AM CST   [facility] Visit with Pat Pack MD   [facility]    [id number] [place] [address] [phone]              Who to contact     If you have questions or need follow up information about today's clinic visit or your schedule please contact [facility] directly at [phone].  Normal or non-critical lab and imaging results will be communicated to you by [employer], letter or phone within 4 business days after the clinic has received the results. If you do not hear from us within 7 days, please contact the clinic through [employer] or phone. If you have a critical or abnormal lab result, we will notify you by phone as soon as possible.  Submit refill requests through [employer] or call your pharmacy and they will forward the refill request to us. Please allow 3 business days for your refill to be completed.          Additional Information About Your Visit        MyChart Information     Fridge lets you send messages to your doctor, view your test results, renew your prescriptions, schedule appointments and more. To sign up, go to [email or web address] . Click on \"Log in\" on the left side of the screen, which will take you to the Welcome page. Then click on \"Sign up Now\" on the right side of the page.     You will be asked to enter the access code listed below, as well as some personal information. Please follow the directions to create your username and password.     Your access code is: [email or web address]  Expires: 2018  9:01 AM     Your access code will  in 90 days. If you need help or a new code, please call your [facility]3] or [phone].        Care EveryWhere ID     This is your Care EveryWhere ID. This could be used by other organizations to " "access your [place] medical records  [id number]        Your Vitals Were     Pulse Temperature Height Pulse Oximetry Breastfeeding? BMI (Body Mass Index)    68 97  F (36.1  C) (Oral) 5' 5\" (1.651 m) 98% No 28.46 kg/m2       Blood Pressure from Last 3 Encounters:   12/13/17 126/76   10/09/17 136/84   09/11/17 112/76    Weight from Last 3 Encounters:   12/13/17 171 lb (77.6 kg)   10/09/17 173 lb (78.5 kg)   09/11/17 173 lb 6.4 oz (78.7 kg)              Today, you had the following     No orders found for display       Primary Care Provider Office Phone # Fax #    Melanie Patel [phone] [address] [facility] [address]        Equal Access to Services     [facility]: Hadii aad ku hadasho Soomaali, waaxda luqadaha, qaybta kaalmada adeegyada, waxay ivaniain haylashayn batsheva merlos . So [facility] [phone].    ATENCIÓN: Si habla español, tiene a barbosa disposición servicios gratuitos de asistencia lingüística. Llame al [phone].    We comply with applicable federal civil rights laws and [place] laws. We do not discriminate on the basis of race, color, national origin, age, disability, sex, sexual orientation, or gender identity.            Thank you!     Thank you for choosing [facility]  for your care. Our goal is always to provide you with excellent care. Hearing back from our patients is one way we can continue to improve our services. Please take a few minutes to complete the written survey that you may receive in the mail after your visit with us. Thank you!             Your Updated Medication List - Protect others around you: Learn how to safely use, store and throw away your medicines at www.disposemymeds.org.          This list is accurate as of: 12/13/17  9:15 AM.  Always use your most recent med list.                   Brand Name Dispense Instructions for use Diagnosis    azithromycin 500 MG tablet    ZITHROMAX     Take 500 mg by mouth daily        " losartan-hydrochlorothiazide 50-12.5 MG per tablet    HYZAAR    90 tablet    Take 1 tablet by mouth daily    Hypertension goal BP (blood pressure) < 150/90       oseltamivir 75 MG capsule    TAMIFLU     Take 75 mg by mouth 2 times daily        simvastatin 40 MG tablet    ZOCOR    90 tablet    Take 1 tablet (40 mg) by mouth daily    Hyperlipidemia with target LDL less than 130       tamoxifen 20 MG tablet    NOLVADEX     Take 20 mg by mouth daily        timolol 0.5 % ophthalmic solution    TIMOPTIC     PLACE 1 DROP INTO THE LEFT EYE QA        vitamin D 2000 UNITS tablet      Take 2,000 Units by mouth daily

## 2017-12-13 NOTE — NURSING NOTE
"Chief Complaint   Patient presents with     Hospital F/U       Initial /76  Pulse 68  Temp 97  F (36.1  C) (Oral)  Ht 5' 5\" (1.651 m)  Wt 171 lb (77.6 kg)  SpO2 98%  Breastfeeding? No  BMI 28.46 kg/m2 Estimated body mass index is 28.46 kg/(m^2) as calculated from the following:    Height as of this encounter: 5' 5\" (1.651 m).    Weight as of this encounter: 171 lb (77.6 kg).  Medication Reconciliation: complete   Allison HUFFMAN MA      "

## 2017-12-18 ENCOUNTER — TELEPHONE (OUTPATIENT)
Dept: FAMILY MEDICINE | Facility: CLINIC | Age: 72
End: 2017-12-18

## 2017-12-18 DIAGNOSIS — J34.89 NASAL DRAINAGE: Primary | ICD-10-CM

## 2017-12-18 RX ORDER — OXYMETAZOLINE HYDROCHLORIDE 0.05 G/100ML
2-3 SPRAY NASAL 2 TIMES DAILY
Qty: 1.8 ML | Refills: 0 | Status: SHIPPED | OUTPATIENT
Start: 2017-12-18 | End: 2017-12-21

## 2017-12-18 NOTE — TELEPHONE ENCOUNTER
Patient states, last Luiz 12/10 (patient collapsed in bathroom) went to hospital and diagnosed with pneumonia & influenza.   Patient saw Dr. Ledesma on Wednesday 12/13.   Patient continues to have constant drainage and is wondering if something can be prescribed to stop the drainage from her nose or something to settle her stomach. She finds the drainage to upset her stomach and she is unable to eat/drink die to this.     Should patient be seen in clinic?  Please advise.  Sandra Gonzalez RN

## 2017-12-18 NOTE — TELEPHONE ENCOUNTER
Prescription sent to [facility] in [place].   Spoke with patient.   Patient verbalized understanding.   Follow up appointment set for 12/21.     Sandra Gonzalez RN

## 2017-12-18 NOTE — TELEPHONE ENCOUNTER
Reason for Call:  Other call back    Detailed comments:  Patient calling. She has had a cold and drainage. It is upsetting her stomach. Please call and advise.     Phone Number Patient can be reached at: Home number on file [phone] (home)    Best Time:  Any     Can we leave a detailed message on this number? YES    Call taken on 12/18/2017 at 12:44 PM by Katrina Kowalski

## 2017-12-20 NOTE — PATIENT INSTRUCTIONS
I think it's safe to be with family. You are not likely to be contagious, but cover coughs and sneezes.     [facility]    If you have any questions regarding to your visit please contact your care team:       Team Red:   Clinic Hours Telephone Number   Dr. Melanie Dc  (pediatrics)  Jaci Victor NP 7am-7pm  Monday - Thursday   7am-5pm  Fridays  [phone] (fax)    Jennifer ANDUJAR  [phone]   Urgent Care - [place] and [place] Monday-Friday  [place] - 11am-8pm  Saturday-Sunday  Both sites - 9am-5pm  [phone] - [facility]  [phone] - [place]       What options do I have for visits at the clinic other than the traditional office visit?  To expand how we care for you, many of our providers are utilizing electronic visits (e-visits) and telephone visits, when medically appropriate, for interactions with their patients rather than a visit in the clinic.   We also offer nurse visits for many medical concerns. Just like any other service, we will bill your insurance company for this type of visit based on time spent on the phone with your provider. Not all insurance companies cover these visits. Please check with your medical insurance if this type of visit is covered. You will be responsible for any charges that are not paid by your insurance.      E-visits via [employer]:  generally incur a $35.00 fee.  Telephone visits:  Time spent on the phone: *charged based on time that is spent on the phone in increments of 10 minutes. Estimated cost:   5-10 mins $30.00   11-20 mins. $59.00   21-30 mins. $85.00     As always, Thank you for trusting us with your health care needs!            Discharged by Isa RHODES CMA ([facility])

## 2017-12-21 ENCOUNTER — OFFICE VISIT (OUTPATIENT)
Dept: FAMILY MEDICINE | Facility: CLINIC | Age: 72
End: 2017-12-21
Payer: MEDICARE

## 2017-12-21 VITALS
WEIGHT: 168 LBS | BODY MASS INDEX: 27.99 KG/M2 | OXYGEN SATURATION: 98 % | DIASTOLIC BLOOD PRESSURE: 70 MMHG | HEIGHT: 65 IN | SYSTOLIC BLOOD PRESSURE: 110 MMHG | RESPIRATION RATE: 15 BRPM | HEART RATE: 69 BPM | TEMPERATURE: 96.6 F

## 2017-12-21 DIAGNOSIS — E21.3 HYPERPARATHYROIDISM (H): ICD-10-CM

## 2017-12-21 DIAGNOSIS — Z09 HOSPITAL DISCHARGE FOLLOW-UP: Primary | ICD-10-CM

## 2017-12-21 DIAGNOSIS — I10 HYPERTENSION GOAL BP (BLOOD PRESSURE) < 150/90: ICD-10-CM

## 2017-12-21 DIAGNOSIS — J11.00 INFLUENZA AND PNEUMONIA: ICD-10-CM

## 2017-12-21 DIAGNOSIS — J30.9 ALLERGIC RHINITIS, UNSPECIFIED CHRONICITY, UNSPECIFIED SEASONALITY, UNSPECIFIED TRIGGER: ICD-10-CM

## 2017-12-21 PROCEDURE — 99214 OFFICE O/P EST MOD 30 MIN: CPT | Performed by: FAMILY MEDICINE

## 2017-12-21 RX ORDER — FEXOFENADINE HCL 180 MG/1
180 TABLET ORAL DAILY PRN
COMMUNITY
Start: 2017-12-21 | End: 2018-10-03

## 2017-12-21 RX ORDER — FLUTICASONE PROPIONATE 50 MCG
1-2 SPRAY, SUSPENSION (ML) NASAL DAILY
COMMUNITY
Start: 2017-12-21 | End: 2021-02-10

## 2017-12-21 NOTE — MR AVS SNAPSHOT
After Visit Summary   12/21/2017    Tracie Feliciano    MRN: [id number]           Patient Information     Date Of Birth          1945        Visit Information        Provider Department      12/21/2017 2:00 PM Melanie Patel MD [facility]        Today's Diagnoses     Hospital discharge follow-up    -  1    Influenza and pneumonia        Hyperparathyroidism (H)        Hypertension goal BP (blood pressure) < 150/90        Allergic rhinitis, unspecified chronicity, unspecified seasonality, unspecified trigger          Care Instructions    I think it's safe to be with family. You are not likely to be contagious, but cover coughs and sneezes.     [facility]    If you have any questions regarding to your visit please contact your care team:       Team Red:   Clinic Hours Telephone Number   Dr. Melanie Dc  (pediatrics)  Jaci Victor NP 7am-7pm  Monday - Thursday   7am-5pm  Fridays  [phone] (fax)    Jennifer ANDUJAR  [phone]   Urgent Care - [place] and [place] Monday-Friday  [place] - 11am-8pm  Saturday-Sunday  Both sites - 9am-5pm  [phone] - [facility]  [phone] - [place]       What options do I have for visits at the clinic other than the traditional office visit?  To expand how we care for you, many of our providers are utilizing electronic visits (e-visits) and telephone visits, when medically appropriate, for interactions with their patients rather than a visit in the clinic.   We also offer nurse visits for many medical concerns. Just like any other service, we will bill your insurance company for this type of visit based on time spent on the phone with your provider. Not all insurance companies cover these visits. Please check with your medical insurance if this type of visit is covered. You will be responsible for any charges that are not paid by your insurance.      E-visits via  MyChart:  generally incur a $35.00 fee.  Telephone visits:  Time spent on the phone: *charged based on time that is spent on the phone in increments of 10 minutes. Estimated cost:   5-10 mins $30.00   11-20 mins. $59.00   21-30 mins. $85.00     As always, Thank you for trusting us with your health care needs!            Discharged by Isa RHODES CMA ([facility])            Follow-ups after your visit        Additional Services     ALLERGY/ASTHMA ADULT REFERRAL       Your provider has referred you to: [facility]: [facility] Viri Cam[facility] [phone]  [email or web address]    Please be aware that coverage of these services is subject to the terms and limitations of your health insurance plan.  Call member services at your health plan with any benefit or coverage questions.      Please bring the following with you to your appointment:    (1) Any X-Rays, CTs or MRIs which have been performed.  Contact the facility where they were done to arrange for  prior to your scheduled appointment.    (2) List of current medications  (3) This referral request   (4) Any documents/labs given to you for this referral                  Follow-up notes from your care team     Return in about 7 months (around 7/17/2018) for Wellness visit (fasting labs up to one week prior).      Your next 10 appointments already scheduled     Jan 19, 2018 11:15 AM CST   New Visit with Pat Pack MD   [facility]    [address] [place] [address] [phone]              Who to contact     If you have questions or need follow up information about today's clinic visit or your schedule please contact [facility] directly at [phone].  Normal or non-critical lab and imaging results will be communicated to you by MyChart, letter or phone within 4 business days after the clinic has received the results. If you do not hear from us within 7 days,  "please contact the clinic through [facility] or phone. If you have a critical or abnormal lab result, we will notify you by phone as soon as possible.  Submit refill requests through [facility] or call your pharmacy and they will forward the refill request to us. Please allow 3 business days for your refill to be completed.          Additional Information About Your Visit        Liquid [employer] Seven Information     [facility] lets you send messages to your doctor, view your test results, renew your prescriptions, schedule appointments and more. To sign up, go to [email or web address] . Click on \"Log in\" on the left side of the screen, which will take you to the Welcome page. Then click on \"Sign up Now\" on the right side of the page.     You will be asked to enter the access code listed below, as well as some personal information. Please follow the directions to create your username and password.     Your access code is: [email or web address]  Expires: 2018  9:01 AM     Your access code will  in 90 days. If you need help or a new code, please call your [place] clinic or [phone].        Care EveryWhere ID     This is your Care EveryWhere ID. This could be used by other organizations to access your [place] medical records  [id number]        Your Vitals Were     Pulse Temperature Respirations Height Pulse Oximetry BMI (Body Mass Index)    69 96.6  F (35.9  C) 15 5' 5\" (1.651 m) 98% 27.96 kg/m2       Blood Pressure from Last 3 Encounters:   17 110/70   17 126/76   10/09/17 136/84    Weight from Last 3 Encounters:   17 168 lb (76.2 kg)   17 171 lb (77.6 kg)   10/09/17 173 lb (78.5 kg)              We Performed the Following     ALLERGY/ASTHMA ADULT REFERRAL        Primary Care Provider Office Phone # Fax #    Melanie Patel [phone] [address] [facility] [address]        Equal Access to Services     [facility]: Nitish Monroe, ebenezer sorensonadaradha, qaybta " manisha figueroabatsheva merlos ah. Meagan [facility] [phone].    ATENCIÓN: Si gold jean, tiene a barbosa disposición servicios gratuitos de asistencia lingüística. Kady al [phone].    We comply with applicable federal civil rights laws and [place] laws. We do not discriminate on the basis of race, color, national origin, age, disability, sex, sexual orientation, or gender identity.            Thank you!     Thank you for choosing [facility]  for your care. Our goal is always to provide you with excellent care. Hearing back from our patients is one way we can continue to improve our services. Please take a few minutes to complete the written survey that you may receive in the mail after your visit with us. Thank you!             Your Updated Medication List - Protect others around you: Learn how to safely use, store and throw away your medicines at www.disposemymeds.org.          This list is accurate as of: 12/21/17  2:46 PM.  Always use your most recent med list.                   Brand Name Dispense Instructions for use Diagnosis    fexofenadine 180 MG tablet    ALLEGRA     Take 1 tablet (180 mg) by mouth daily as needed for allergies        fluticasone 50 MCG/ACT spray    FLONASE     Spray 1-2 sprays into both nostrils daily        losartan-hydrochlorothiazide 50-12.5 MG per tablet    HYZAAR    90 tablet    Take 1 tablet by mouth daily    Hypertension goal BP (blood pressure) < 150/90       simvastatin 40 MG tablet    ZOCOR    90 tablet    Take 1 tablet (40 mg) by mouth daily    Hyperlipidemia with target LDL less than 130       tamoxifen 20 MG tablet    NOLVADEX     Take 20 mg by mouth daily        timolol 0.5 % ophthalmic solution    TIMOPTIC     PLACE 1 DROP INTO THE LEFT EYE QAM        vitamin D 2000 UNITS tablet      Take 2,000 Units by mouth daily

## 2017-12-21 NOTE — NURSING NOTE
"Chief Complaint   Patient presents with     Hospital F/U     headache,drainage,upset stomach       Initial /70  Pulse 69  Temp 96.6  F (35.9  C)  Resp 15  Ht 5' 5\" (1.651 m)  Wt 168 lb (76.2 kg)  SpO2 98%  BMI 27.96 kg/m2 Estimated body mass index is 27.96 kg/(m^2) as calculated from the following:    Height as of this encounter: 5' 5\" (1.651 m).    Weight as of this encounter: 168 lb (76.2 kg).  Medication Reconciliation: complete     Pat Fallon. MA      "

## 2017-12-21 NOTE — PROGRESS NOTES
"  SUBJECTIVE:   Tracie Feliciano is a 72 year old female who presents to clinic today for the following health issues:    Hospital Follow-up Visit:    H[place]ing [place] [facility] [place] of Admission: 12/10/17  Date of Discharge: 12/11/17  Reason(s) for Admission: pneumonia and influenza            Problems taking medications regularly:  None       Medication changes since discharge: None       Problems adhering to non-medication therapy:  None    Summary of hospitalization:  CareEverywhere information obtained and reviewed  Diagnostic Tests/Treatments reviewed.  Follow up needed: none  Other Healthcare Providers Involved in Patient s Care:         None  Update since discharge: improved.      Post Discharge Medication Reconciliation: discharge medications reconciled and changed, per note/orders (see AVS).  Plan of care communicated with patient     Coding guidelines for this visit:  Type of Medical   Decision Making Face-to-Face Visit       within 7 Days of discharge Face-to-Face Visit        within 14 days of discharge   Moderate Complexity [id number]   High Complexity [id number]          Tracie Feliciano is a 72 year old female who presents with cough and generalized weakness. Symptom onset has been sudden, improving for a time period of 11 days. Severity is described as mild. Course of her symptoms over time is improving.    I have reviewed the patient's medical history in detail and updated the computerized patient record.     ROS:  C: NEGATIVE for fever, chills, change in weight  I: NEGATIVE for worrisome rashes, moles or lesions  E: NEGATIVE for vision changes or irritation  ENT/MOUTH: post nasal drip   GI: nausea   R: NEGATIVE for significant cough or SOB  CV: NEGATIVE for chest pain, palpitations or peripheral edema    OBJECTIVE:     /70  Pulse 69  Temp 96.6  F (35.9  C)  Resp 15  Ht 5' 5\" (1.651 m)  Wt 168 lb (76.2 kg)  SpO2 98%  BMI 27.96 kg/m2  Body mass index is " 27.96 kg/(m^2).  GENERAL: healthy, alert and no distress  EYES: Eyes grossly normal to inspection, PERRL and conjunctivae and sclerae normal  HENT: ear canals and TM's normal, nose and mouth without ulcers or lesions  NECK: no adenopathy, no asymmetry, masses, or scars and thyroid normal to palpation  RESP: lungs clear to auscultation - no rales, rhonchi or wheezes  CV: regular rate and rhythm, normal S1 S2, no S3 or S4, no murmur, click or rub, no peripheral edema    MS: no gross musculoskeletal defects noted   PSYCH: mentation appears normal, affect normal/bright    Diagnostic Test Results:  Results for orders placed or performed in visit on 17   DEXA HIP/PELVIS/SPINE - Future    Narrative    BONE DENSITOMETRY  [address] [address]  2017      PATIENT: Tracie Feliciano  CHART: [id number]   :  1945  AGE:  72 year old  SEX:  female   REFERRING PROVIDER:  Melanie Patel MD     PROCEDURE:  Bone density scanning was performed using DXA technology of   the lumbar spine and hip.  Scanning was performed on a Lunar Prodigy   scanner.  Reporting is completed in the form of a T-score.  The T-score   represents the standard deviation from peak bone mass based on a young   healthy adult.     REFERENCE T-SCORES:       Normal                -1.0 and greater                                 Osteopenia         Between -1.0 and -2.5                                             Osteoporosis     -2.5 and less                                       RISK FACTORS:  Post-menopausal    CURRENT TREATMENT:  none listed     FINDINGS:               Lumbar Spine (L1-L2) T-score: -0.9, marked degenerative   changes present at L3,4, so only L1,2 are evaluated.                Left Femoral Neck T-score:  -1.9               Right Femoral Neck T-score:  -1.4                              Lumbar (L1-L2) BMD: 1.057            Previous:   1.094                                               "Total Hip Mean BMD: 0.865            Previous:   0.907     Comparison is made to another DXA performed on the same Lunar Prodigy    machine on 05/01/2014.      IMPRESSION  Osteopenia., Degenerative changes of the lumbar spine which may falsely   elevate results.    There has been no significant change in bone density of the lumbar spine.   There has been a trend toward significant decrease in bone density of the   hip(s).    Recommendations include ensuring adequate Calcium and Vitamin D.    The current NOF Guidelines recommend treatment for patients with prior hip   or vertebral fracture, T-score -2.5 or below, or 10 year risk of any major   osteoporotic fracture >20% or 10 year risk of hip fracture >3%, as   calculated using the FRAX calculator (www.shef.ac.uk/FRAX or you can   google \"FRAX\").      This patient's risks based on available information, with the use of FRAX,   are 11 % for major osteoporotic fracture and 2.1 % for hip fracture.   Based on these guidelines, treatment (in addition to calcium and vitamin   D) is not recommended for this patient, after ruling out other causes of   osteoporosis.  This is meant as an aid to clinical decision making; one must still use   clinical judgement.      Follow up can be considered in 2 years.   ___________________  Ruth Pickering M.D.  Electronically signed          ASSESSMENT/PLAN:   (Z09) Hospital discharge follow-up  (primary encounter diagnosis)  (J11.00) Influenza and pneumonia  Plan: Call or return to clinic as needed if these symptoms worsen or fail to improve as anticipated.     (E21.3) Hyperparathyroidism (H)  Plan: see endocrine next month as scheduled     (I10) Hypertension goal BP (blood pressure) < 150/90  Comment: Well controlled with medications without side effects.     (J30.9) Allergic rhinitis, unspecified chronicity, unspecified seasonality, unspecified trigger  Plan: ALLERGY/ASTHMA ADULT REFERRAL        Resume usual medications       See Patient " Instructions    Melanie Patel MD  [facility]

## 2018-01-08 ENCOUNTER — TELEPHONE (OUTPATIENT)
Dept: FAMILY MEDICINE | Facility: CLINIC | Age: 73
End: 2018-01-08

## 2018-01-08 NOTE — TELEPHONE ENCOUNTER
Per patient, she is better from pneumonia and the flu but overall is still recovering as she still feels drained. Last week, she was exposed to her friend's son whom they later found out  Was diagnosed with the whooping cough.  Patient denies any symptoms.  Advised her to monitor for symptoms and call right away with any new cold/flu symptoms or cough.  Advised her that everyone in the household should practice good hand hygiene, not share food/eating utensils, and cover up coughs  Patient verbalized understanding.  Jeffery Singh RN

## 2018-01-08 NOTE — TELEPHONE ENCOUNTER
Reason for Call:  Question     Detailed comments: Patient states she just got over with having pneumonia and the flu. States that she was exposed to someone last Thursday morning that was confirmed with having whooping cough. Patient would like a call back to discuss how this could effect her and her family.     Phone Number Patient can be reached at: Home number on file [phone] (home)    Best Time: any    Can we leave a detailed message on this number? YES    Call taken on 1/8/2018 at 3:05 PM by Elizabeth King

## 2018-01-19 ENCOUNTER — OFFICE VISIT (OUTPATIENT)
Dept: ENDOCRINOLOGY | Facility: CLINIC | Age: 73
End: 2018-01-19
Attending: FAMILY MEDICINE
Payer: MEDICARE

## 2018-01-19 VITALS
SYSTOLIC BLOOD PRESSURE: 127 MMHG | HEART RATE: 67 BPM | WEIGHT: 169.13 LBS | OXYGEN SATURATION: 98 % | TEMPERATURE: 97.8 F | HEIGHT: 65 IN | BODY MASS INDEX: 28.18 KG/M2 | DIASTOLIC BLOOD PRESSURE: 82 MMHG

## 2018-01-19 DIAGNOSIS — E21.0 PRIMARY HYPERPARATHYROIDISM (H): Primary | ICD-10-CM

## 2018-01-19 LAB
ANION GAP SERPL CALCULATED.3IONS-SCNC: 6 MMOL/L (ref 3–14)
BUN SERPL-MCNC: 11 MG/DL (ref 7–30)
CALCIUM SERPL-MCNC: 9.9 MG/DL (ref 8.5–10.1)
CHLORIDE SERPL-SCNC: 102 MMOL/L (ref 94–109)
CO2 SERPL-SCNC: 29 MMOL/L (ref 20–32)
CREAT SERPL-MCNC: 0.46 MG/DL (ref 0.52–1.04)
GFR SERPL CREATININE-BSD FRML MDRD: >90 ML/MIN/1.7M2
GLUCOSE SERPL-MCNC: 125 MG/DL (ref 70–99)
POTASSIUM SERPL-SCNC: 3.6 MMOL/L (ref 3.4–5.3)
PTH-INTACT SERPL-MCNC: 89 PG/ML (ref 12–72)
SODIUM SERPL-SCNC: 137 MMOL/L (ref 133–144)

## 2018-01-19 PROCEDURE — 80048 BASIC METABOLIC PNL TOTAL CA: CPT | Performed by: INTERNAL MEDICINE

## 2018-01-19 PROCEDURE — 83970 ASSAY OF PARATHORMONE: CPT | Performed by: INTERNAL MEDICINE

## 2018-01-19 PROCEDURE — 36415 COLL VENOUS BLD VENIPUNCTURE: CPT | Performed by: INTERNAL MEDICINE

## 2018-01-19 PROCEDURE — 82652 VIT D 1 25-DIHYDROXY: CPT | Performed by: INTERNAL MEDICINE

## 2018-01-19 PROCEDURE — 82306 VITAMIN D 25 HYDROXY: CPT | Performed by: INTERNAL MEDICINE

## 2018-01-19 PROCEDURE — 99204 OFFICE O/P NEW MOD 45 MIN: CPT | Performed by: INTERNAL MEDICINE

## 2018-01-19 NOTE — LETTER
Patient:  Tracie Feliciano  :   1945  MRN:     [id number]        Ms.Sarah GREGORY Feliciano  [address] [facility] [facility] [address]        February 10, 2018    Dear ,    We are writing to inform you of your test results. As I left message before, by summarizing, Calcium normal, urine calcium test normal, active Vitamin D level normal, slight elevation of parathyroid hormone (PTH). These are the levels we can safely monitor. If you have any questions, please contact us.     Take care    Pat Pack MD      Resulted Orders   Parathyroid Hormone Intact   Result Value Ref Range    Parathyroid Hormone Intact 89 (H) 12 - 72 pg/mL   Basic metabolic panel   Result Value Ref Range    Sodium 137 133 - 144 mmol/L    Potassium 3.6 3.4 - 5.3 mmol/L    Chloride 102 94 - 109 mmol/L    Carbon Dioxide 29 20 - 32 mmol/L    Anion Gap 6 3 - 14 mmol/L    Glucose 125 (H) 70 - 99 mg/dL      Comment:      Non Fasting    Urea Nitrogen 11 7 - 30 mg/dL    Creatinine 0.46 (L) 0.52 - 1.04 mg/dL    GFR Estimate >90 >60 mL/min/1.7m2      Comment:      Non  GFR Calc    GFR Estimate If Black >90 >60 mL/min/1.7m2      Comment:       GFR Calc    Calcium 9.9 8.5 - 10.1 mg/dL   1,25 Dihydroxyvitamin D   Result Value Ref Range    1,25 Dihydroxyvitamin D 68.6 19.9 - 79.3 pg/mL   25 Hydroxyvitamin D2 and D3   Result Value Ref Range    25 OH Vit D2 <5 ug/L    25 OH Vit D3 25 ug/L    25 OH Vit D total <30 20 - 75 ug/L      Comment:      Season, race, dietary intake, and treatment affect the concentration of   25-hydroxy-Vitamin D. Values may decrease during winter months and increase   during summer months. Values 20-29 ug/L may indicate Vitamin D insufficiency   and values <20 ug/L may indicate Vitamin D deficiency.  This test was developed and its performance characteristics determined by the   [facility],  Special [facility] Laboratory. It has   not been cleared or approved  by the FDA. The laboratory is regulated under   CLIA as qualified to perform high-complexity testing. This test is used for   clinical purposes. It should not be regarded as investigational or for   research.         Pat Pack MD

## 2018-01-19 NOTE — PATIENT INSTRUCTIONS
Please allow 7-10 business days for your lab results. You will be notified by phone, letter, or My Chart after the provider has reviewed them.  Thank you.

## 2018-01-19 NOTE — MR AVS SNAPSHOT
After Visit Summary   1/19/2018    Tracie Feliciano    MRN: [id number]           Patient Information     Date Of Birth          1945        Visit Information        Provider Department      1/19/2018 11:15 AM Pat Pack MD [facility]        Care Instructions    Please allow 7-10 business days for your lab results. You will be notified by phone, letter, or My Chart after the provider has reviewed them.  Thank you.               Follow-ups after your visit        Follow-up notes from your care team     Return in about 5 months (around 6/19/2018).      Your next 10 appointments already scheduled     May 18, 2018 12:30 PM CDT   Return Visit with Pat Pack MD   [facility]    [address] [place] [id number] [phone]              Who to contact     If you have questions or need follow up information about today's clinic visit or your schedule please contact [facility] directly at [phone].  Normal or non-critical lab and imaging results will be communicated to you by MyChart, letter or phone within 4 business days after the clinic has received the results. If you do not hear from us within 7 days, please contact the clinic through [employer] or phone. If you have a critical or abnormal lab result, we will notify you by phone as soon as possible.  Submit refill requests through [employer] or call your pharmacy and they will forward the refill request to us. Please allow 3 business days for your refill to be completed.          Additional Information About Your Visit        [employer] Information     [employer] is an electronic gateway that provides easy, online access to your medical records. With [employer], you can request a clinic appointment, read your test results, renew a prescription or communicate with your care team.     To sign up for [employer] visit the website at [email or web address]  "  You will be asked to enter the access code listed below, as well as some personal information. Please follow the directions to create your username and password.     Your access code is: [email or web address]  Expires: 2018 12:11 PM     Your access code will  in 90 days. If you need help or a new code, please contact your [facility] or call [phone] for assistance.        Care EveryWhere ID     This is your Care EveryWhere ID. This could be used by other organizations to access your [place] medical records  [id number]        Your Vitals Were     Pulse Temperature Height Pulse Oximetry BMI (Body Mass Index)       67 97.8  F (36.6  C) 1.651 m (5' 5\") 98% 28.14 kg/m2        Blood Pressure from Last 3 Encounters:   18 127/82   17 110/70   17 126/76    Weight from Last 3 Encounters:   18 76.7 kg (169 lb 2 oz)   17 76.2 kg (168 lb)   17 77.6 kg (171 lb)              Today, you had the following     No orders found for display       Primary Care Provider Office Phone # Fax #    Melanie Patel [phone] [address] [facility] [address]        Equal Access to Services     [facility] Hospital[facility]SEPHINE : Hadii aad ku hadasho Soomaali, waaxda luqadaha, qaybta kaalmada adeegyada, manisha merlos . So [facility] [phone].    ATENCIÓN: Si habla español, tiene a barbosa disposición servicios gratuitos de asistencia lingüística. Llame al [phone].    We comply with applicable federal civil rights laws and [place] laws. We do not discriminate on the basis of race, color, national origin, age, disability, sex, sexual orientation, or gender identity.            Thank you!     Thank you for choosing [facility]  for your care. Our goal is always to provide you with excellent care. Hearing back from our patients is one way we can continue to improve our services. Please take a few minutes to complete the " written survey that you may receive in the mail after your visit with us. Thank you!             Your Updated Medication List - Protect others around you: Learn how to safely use, store and throw away your medicines at www.disposemymeds.org.          This list is accurate as of: 1/19/18 12:11 PM.  Always use your most recent med list.                   Brand Name Dispense Instructions for use Diagnosis    fexofenadine 180 MG tablet    ALLEGRA     Take 1 tablet (180 mg) by mouth daily as needed for allergies        fluticasone 50 MCG/ACT spray    FLONASE     Spray 1-2 sprays into both nostrils daily PRN        losartan-hydrochlorothiazide 50-12.5 MG per tablet    HYZAAR    90 tablet    Take 1 tablet by mouth daily    Hypertension goal BP (blood pressure) < 150/90       simvastatin 40 MG tablet    ZOCOR    90 tablet    Take 1 tablet (40 mg) by mouth daily    Hyperlipidemia with target LDL less than 130       tamoxifen 20 MG tablet    NOLVADEX     Take 20 mg by mouth daily        timolol 0.5 % ophthalmic solution    TIMOPTIC     PLACE 1 DROP INTO THE LEFT EYE QAM        vitamin D 2000 UNITS tablet      Take 2,000 Units by mouth daily

## 2018-01-19 NOTE — NURSING NOTE
"Tracie Feliciano's goals for this visit include:   Chief Complaint   Patient presents with     Thyroid Problem       She requests these members of her care team be copied on today's visit information: Melanie Patel      PCP: Melanie Patel    Referring Provider:  Melanie Patel MD  [address] [place] [address]    Chief Complaint   Patient presents with     Thyroid Problem       Initial /82  Pulse 67  Temp 97.8  F (36.6  C)  Ht 1.651 m (5' 5\")  Wt 76.7 kg (169 lb 2 oz)  SpO2 98%  BMI 28.14 kg/m2 Estimated body mass index is 28.14 kg/(m^2) as calculated from the following:    Height as of this encounter: 1.651 m (5' 5\").    Weight as of this encounter: 76.7 kg (169 lb 2 oz).  Medication Reconciliation: complete    Do you need any medication refills at today's visit? No    Monica Rodriguez LPN      "

## 2018-01-19 NOTE — LETTER
1/19/2018         RE: Tracie Feliciano  [address] [facility] [address]        Dear Colleague,    Thank you for referring your patient, Tracie Feliciano, to the [facility]. Please see a copy of my visit note below.                                                                               - Endocrinology Initial Consultation -    Reason for visit/consult: elevated Ca and PTH    Primary care provider: Melanie Patel    HPI: A 72 female here for the evaluation of her elevated Ca and PTH. Referral from Dr. Patel.  Mildly elevated to calcium 10.3 was noted in August 2017, with . Patient has been asymptomatic.  No joint pain, no polydipsia, no polyuria, no abdominal pain, no constipation.  Pertinent medical history include osteopenia for the past 8-10 years, no family history of osteoporosis, never had a fracture before.  Currently taking vitamin D supplement , not taking a calcium supplement.  No history of kidney stone, no history of kidney disease.  Most recent bone density December 2017, showed left femoral neck T score -1.9.  Appetite: good, Joint pain: no, 3-4 glass of water/day, no polydipsia, no polyuria, no dysphagia.    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     Past Medical/Surgical History:  Past Medical History:   Diagnosis Date     Allergic rhinitis     allergy consultation     Breast neoplasm, Tis (LCIS), left      Cataract      Colon adenomas 6/25/2007     EUGENIE (generalized anxiety disorder)      HTN (hypertension)      Hyperlipidemia LDL goal < 130      Hyperparathyroidism (H)      Impaired fasting glucose 5/21/2013     LFT's abnormal     elevated GGT, resolved     Osteopenia      Venous (peripheral) insufficiency 6/6/2016     Vitamin D deficiency      Past Surgical History:   Procedure Laterality Date     C/SECTION, LOW TRANSVERSE  4/2/80     COLONOSCOPY  6/21/2012     "Procedure: COLONOSCOPY;  COLONOSCOPY, SCREEN, PREVIOUS POLYP;  Surgeon: Maverick Maradiaga MD;  Location: [facility] OR     EYE SURGERY Bilateral 2017    cataract     HYSTERECTOMY, PAP NO LONGER INDICATED  9/30/09    BSO      LUMPECTOMY BREAST Left 08/18/2017       Allergies:  Allergies   Allergen Reactions     Aspirin      Reactive gastropathy     Contrast Dye      sneezing     Lisinopril Cough       Current Medications   Current Outpatient Prescriptions   Medication     tamoxifen (NOLVADEX) 20 MG tablet     timolol (TIMOPTIC) 0.5 % ophthalmic solution     losartan-hydrochlorothiazide (HYZAAR) 50-12.5 MG per tablet     simvastatin (ZOCOR) 40 MG tablet     Cholecalciferol (VITAMIN D) 2000 UNITS tablet     fexofenadine (ALLEGRA) 180 MG tablet     fluticasone (FLONASE) 50 MCG/ACT spray     No current facility-administered medications for this visit.        Family History:  Family History   Problem Relation Age of Onset     CANCER Mother      d. pelvic     C.A.D. Mother      ?     C.A.D. Father 79     MI, d.     Alzheimer Disease Father      GASTROINTESTINAL DISEASE Father      PUD     CANCER Maternal Aunt      GI?     DIABETES No family hx of        Social History:  Social History   Substance Use Topics     Smoking status: Former Smoker     Packs/day: 0.50     Years: 1.00     Types: Cigarettes     Quit date: 1/1/1967     Smokeless tobacco: Never Used     Alcohol use 1.5 oz/week     3 Standard drinks or equivalent per week      Comment: wine   Yoga twice a week,     ROS:  Full review of systems taken with the help of the intake sheet. Otherwise a complete 14 point review of systems was taken and is negative unless stated in the history above.      Physical Exam:   Blood pressure 127/82, pulse 67, temperature 97.8  F (36.6  C), height 1.651 m (5' 5\"), weight 76.7 kg (169 lb 2 oz), SpO2 98 %,   General: well appearing, no acute distress, pleasant and conversant,   Mental Status/neuro: alert and oriented  Face: symmetrical, " normal facial color  Eyes: anicteric, PERRL, no proptosis or lid lag  Neck: suppler, no lymphadenopahty  Thyroid: normal size and texture, no nodule palpable, no bruits  Back: no scoliasis, no kyphosis  eart: regular rhythm, S1S2, no murmur appreciated  Lung: clear to auscultation bilaterally  Abdomen: soft, NT/ND, no hepatomegaly  Legs: no swelling or edema  Feet: no deformities or ulcers, 2+ DP pulses, normal monofilament sensation      Labs : I reviewed data from epic and extract and summarize the pertinent data here.        4/21/2015 15:09 5/31/2016 10:23 7/17/2017 10:13 8/14/2017 14:52 9/11/2017 09:31   Calcium 10.5 (H) 9.7 9.9 10.3 (H) 10.2 (H)        9/11/2017 09:31   Parathyroid Hormone Intact 120 (H)        9/11/2017 09:31   Urea Nitrogen 12   Creatinine 0.58   GFR Estimate >90   GFR Estimate If Black >90        9/11/2017 09:31   Vitamin D Deficiency screening 30        5/31/2016 10:23   TSH 1.83       Lab Results   Component Value Date     09/11/2017      Lab Results   Component Value Date    POTASSIUM 4.0 09/11/2017     Lab Results   Component Value Date    CHLORIDE 100 09/11/2017     Lab Results   Component Value Date    KWAME 10.2 09/11/2017     Lab Results   Component Value Date    CO2 28 09/11/2017     Lab Results   Component Value Date    BUN 12 09/11/2017     Lab Results   Component Value Date    CR 0.58 09/11/2017     Lab Results   Component Value Date    GLC 95 09/11/2017     Lab Results   Component Value Date    TSH 1.83 05/31/2016     Bone density 12/1/2017: I personally reviewed the original images and agree with the below reports.       BONE DENSITOMETRY  [address] [address]  12/1/2017       FINDINGS:               Lumbar Spine (L1-L2) T-score: -0.9, marked degenerative changes present at L3,4, so only L1,2 are evaluated.                Left Femoral Neck T-score:  -1.9               Right Femoral Neck T-score:   -1.4                             Lumbar (L1-L2) BMD: 1.057            Previous: 1.094                                              Total Hip Mean BMD: 0.865            Previous: 0.907      Comparison is made to another DXA performed on the same Lunar Prodigy  machine on 05/01/2014.       IMPRESSION  Osteopenia., Degenerative changes of the lumbar spine which may falsely elevate results.          Assessment and Plan  72 year old female with mildly elevated Ca, PTH, osteopenia, asymptomatic, no history of kidney stones  #Possible primary hyperparathyroidism, however asymptomatic, only mild elevation of calcium, no osteoporosis but osteopenia, no history of kidney stone, at this point I do not think there is a indication for surgery, start a low-dose of calcium for bone protection and we will monitor    - PTH, Ca, Vitamin D 25, 1-25, and 24 hour urine caclium  - Calcium citrate 630 mg for osteopenia  - RTC with me in 5-6 month    --- Addendum ---  Currently normo-calcemia, we will observe. I called and left message.   1/19/2018 12:19   Sodium 137   Potassium 3.6   Chloride 102   Carbon Dioxide 29   Urea Nitrogen 11   Creatinine 0.46 (L)   GFR Estimate >90   GFR Estimate If Black >90   Calcium 9.9   Anion Gap 6   Glucose 125 (H)     I spent 45 minutes with this patient face to face and explained the conditions and plans (more than 50% of time was counseling/coordination of care) . The patient understood and is satisfied with today's visit. Return to clinic with me in 6 months.         Pat Pack MD  Staff Physician  Endocrinology and Metabolism  License: [id number]    Again, thank you for allowing me to participate in the care of your patient.        Sincerely,        Pat Pack MD

## 2018-01-19 NOTE — LETTER
1/19/2018       RE: Tracie Feliciano  [address] [facility] [address]     Dear Colleague,    Thank you for referring your patient, Tracie Feliciano, to the [facility] at [facility]. Please see a copy of my visit note below.                                                                               - Endocrinology Initial Consultation -    Reason for visit/consult: elevated Ca and PTH    Primary care provider: Melanie Patel    HPI: A 72 female here for the evaluation of her elevated Ca and PTH. Referral from Dr. Patel.  Mildly elevated to calcium 10.3 was noted in August 2017, with . Patient has been asymptomatic.  No joint pain, no polydipsia, no polyuria, no abdominal pain, no constipation.  Pertinent medical history include osteopenia for the past 8-10 years, no family history of osteoporosis, never had a fracture before.  Currently taking vitamin D supplement , not taking a calcium supplement.  No history of kidney stone, no history of kidney disease.  Most recent bone density December 2017, showed left femoral neck T score -1.9.  Appetite: good, Joint pain: no, 3-4 glass of water/day, no polydipsia, no polyuria, no dysphagia.    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     Past Medical/Surgical History:  Past Medical History:   Diagnosis Date     Allergic rhinitis     allergy consultation     Breast neoplasm, Tis (LCIS), left      Cataract      Colon adenomas 6/25/2007     EUGENIE (generalized anxiety disorder)      HTN (hypertension)      Hyperlipidemia LDL goal < 130      Hyperparathyroidism (H)      Impaired fasting glucose 5/21/2013     LFT's abnormal     elevated GGT, resolved     Osteopenia      Venous (peripheral) insufficiency 6/6/2016     Vitamin D deficiency      Past Surgical History:   Procedure Laterality Date     C/SECTION, LOW TRANSVERSE   "4/2/80     COLONOSCOPY  6/21/2012    Procedure: COLONOSCOPY;  COLONOSCOPY, SCREEN, PREVIOUS POLYP;  Surgeon: Maverick Maradiaga MD;  Location: [facility] OR     EYE SURGERY Bilateral 2017    cataract     HYSTERECTOMY, PAP NO LONGER INDICATED  9/30/09    BSO      LUMPECTOMY BREAST Left 08/18/2017       Allergies:  Allergies   Allergen Reactions     Aspirin      Reactive gastropathy     Contrast Dye      sneezing     Lisinopril Cough       Current Medications   Current Outpatient Prescriptions   Medication     tamoxifen (NOLVADEX) 20 MG tablet     timolol (TIMOPTIC) 0.5 % ophthalmic solution     losartan-hydrochlorothiazide (HYZAAR) 50-12.5 MG per tablet     simvastatin (ZOCOR) 40 MG tablet     Cholecalciferol (VITAMIN D) 2000 UNITS tablet     fexofenadine (ALLEGRA) 180 MG tablet     fluticasone (FLONASE) 50 MCG/ACT spray     No current facility-administered medications for this visit.        Family History:  Family History   Problem Relation Age of Onset     CANCER Mother      d. pelvic     C.A.D. Mother      ?     C.A.D. Father 79     MI, d.     Alzheimer Disease Father      GASTROINTESTINAL DISEASE Father      PUD     CANCER Maternal Aunt      GI?     DIABETES No family hx of        Social History:  Social History   Substance Use Topics     Smoking status: Former Smoker     Packs/day: 0.50     Years: 1.00     Types: Cigarettes     Quit date: 1/1/1967     Smokeless tobacco: Never Used     Alcohol use 1.5 oz/week     3 Standard drinks or equivalent per week      Comment: wine   Yoga twice a week,     ROS:  Full review of systems taken with the help of the intake sheet. Otherwise a complete 14 point review of systems was taken and is negative unless stated in the history above.      Physical Exam:   Blood pressure 127/82, pulse 67, temperature 97.8  F (36.6  C), height 1.651 m (5' 5\"), weight 76.7 kg (169 lb 2 oz), SpO2 98 %,   General: well appearing, no acute distress, pleasant and conversant,   Mental Status/neuro: " alert and oriented  Face: symmetrical, normal facial color  Eyes: anicteric, PERRL, no proptosis or lid lag  Neck: suppler, no lymphadenopahty  Thyroid: normal size and texture, no nodule palpable, no bruits  Back: no scoliasis, no kyphosis  eart: regular rhythm, S1S2, no murmur appreciated  Lung: clear to auscultation bilaterally  Abdomen: soft, NT/ND, no hepatomegaly  Legs: no swelling or edema  Feet: no deformities or ulcers, 2+ DP pulses, normal monofilament sensation      Labs : I reviewed data from epic and extract and summarize the pertinent data here.        4/21/2015 15:09 5/31/2016 10:23 7/17/2017 10:13 8/14/2017 14:52 9/11/2017 09:31   Calcium 10.5 (H) 9.7 9.9 10.3 (H) 10.2 (H)        9/11/2017 09:31   Parathyroid Hormone Intact 120 (H)        9/11/2017 09:31   Urea Nitrogen 12   Creatinine 0.58   GFR Estimate >90   GFR Estimate If Black >90        9/11/2017 09:31   Vitamin D Deficiency screening 30        5/31/2016 10:23   TSH 1.83       Lab Results   Component Value Date     09/11/2017      Lab Results   Component Value Date    POTASSIUM 4.0 09/11/2017     Lab Results   Component Value Date    CHLORIDE 100 09/11/2017     Lab Results   Component Value Date    KWAME 10.2 09/11/2017     Lab Results   Component Value Date    CO2 28 09/11/2017     Lab Results   Component Value Date    BUN 12 09/11/2017     Lab Results   Component Value Date    CR 0.58 09/11/2017     Lab Results   Component Value Date    GLC 95 09/11/2017     Lab Results   Component Value Date    TSH 1.83 05/31/2016     Bone density 12/1/2017: I personally reviewed the original images and agree with the below reports.       BONE DENSITOMETRY  [address]  [place] [address]  12/1/2017       FINDINGS:               Lumbar Spine (L1-L2) T-score: -0.9, marked degenerative changes present at L3,4, so only L1,2 are evaluated.                Left Femoral Neck T-score:  -1.9               Right Femoral Neck  T-score:  -1.4                             Lumbar (L1-L2) BMD: 1.057            Previous: 1.094                                              Total Hip Mean BMD: 0.865            Previous: 0.907      Comparison is made to another DXA performed on the same Lunar Prodig[employer]  machine on 05/01/2014.       IMPRESSION  Osteopenia., Degenerative changes of the lumbar spine which may falsely elevate results.          Assessment and Plan  72 year old female with mildly elevated Ca, PTH, osteopenia, asymptomatic, no history of kidney stones  #Possible primary hyperparathyroidism, however asymptomatic, only mild elevation of calcium, no osteoporosis but osteopenia, no history of kidney stone, at this point I do not think there is a indication for surgery, start a low-dose of calcium for bone protection and we will monitor    - PTH, Ca, Vitamin D 25, 1-25, and 24 hour urine caclium  - Calcium citrate 630 mg for osteopenia  - RTC with me in 5-6 month    --- Addendum ---  Currently normo-calcemia, we will observe. I called and left message.   1/19/2018 12:19   Sodium 137   Potassium 3.6   Chloride 102   Carbon Dioxide 29   Urea Nitrogen 11   Creatinine 0.46 (L)   GFR Estimate >90   GFR Estimate If Black >90   Calcium 9.9   Anion Gap 6   Glucose 125 (H)     I spent 45 minutes with this patient face to face and explained the conditions and plans (more than 50% of time was counseling/coordination of care) . The patient understood and is satisfied with today's visit. Return to clinic with me in 6 months.         Pat Pack MD  Staff Physician  Endocrinology and Metabolism  License: [id number]    Again, thank you for allowing me to participate in the care of your patient.      Sincerely,    Pat Pack MD

## 2018-01-19 NOTE — PROGRESS NOTES
- Endocrinology Initial Consultation -    Reason for visit/consult: elevated Ca and PTH    Primary care provider: Melanie Patel    HPI: A 72 female here for the evaluation of her elevated Ca and PTH. Referral from Dr. Patel.  Mildly elevated to calcium 10.3 was noted in August 2017, with . Patient has been asymptomatic.  No joint pain, no polydipsia, no polyuria, no abdominal pain, no constipation.  Pertinent medical history include osteopenia for the past 8-10 years, no family history of osteoporosis, never had a fracture before.  Currently taking vitamin D supplement , not taking a calcium supplement.  No history of kidney stone, no history of kidney disease.  Most recent bone density December 2017, showed left femoral neck T score -1.9.  Appetite: good, Joint pain: no, 3-4 glass of water/day, no polydipsia, no polyuria, no dysphagia.    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     Past Medical/Surgical History:  Past Medical History:   Diagnosis Date     Allergic rhinitis     allergy consultation     Breast neoplasm, Tis (LCIS), left      Cataract      Colon adenomas 6/25/2007     EUGENIE (generalized anxiety disorder)      HTN (hypertension)      Hyperlipidemia LDL goal < 130      Hyperparathyroidism (H)      Impaired fasting glucose 5/21/2013     LFT's abnormal     elevated GGT, resolved     Osteopenia      Venous (peripheral) insufficiency 6/6/2016     Vitamin D deficiency      Past Surgical History:   Procedure Laterality Date     C/SECTION, LOW TRANSVERSE  4/2/80     COLONOSCOPY  6/21/2012    Procedure: COLONOSCOPY;  COLONOSCOPY, SCREEN, PREVIOUS POLYP;  Surgeon: Maverick Maradiaga MD;  Location: [facility] OR     EYE SURGERY Bilateral 2017    cataract     HYSTERECTOMY, PAP NO LONGER INDICATED  9/30/09    BSO      LUMPECTOMY BREAST Left 08/18/2017  "      Allergies:  Allergies   Allergen Reactions     Aspirin      Reactive gastropathy     Contrast Dye      sneezing     Lisinopril Cough       Current Medications   Current Outpatient Prescriptions   Medication     tamoxifen (NOLVADEX) 20 MG tablet     timolol (TIMOPTIC) 0.5 % ophthalmic solution     losartan-hydrochlorothiazide (HYZAAR) 50-12.5 MG per tablet     simvastatin (ZOCOR) 40 MG tablet     Cholecalciferol (VITAMIN D) 2000 UNITS tablet     fexofenadine (ALLEGRA) 180 MG tablet     fluticasone (FLONASE) 50 MCG/ACT spray     No current facility-administered medications for this visit.        Family History:  Family History   Problem Relation Age of Onset     CANCER Mother      d. pelvic     C.A.D. Mother      ?     C.A.D. Father 79     MI, d.     Alzheimer Disease Father      GASTROINTESTINAL DISEASE Father      PUD     CANCER Maternal Aunt      GI?     DIABETES No family hx of        Social History:  Social History   Substance Use Topics     Smoking status: Former Smoker     Packs/day: 0.50     Years: 1.00     Types: Cigarettes     Quit date: 1/1/1967     Smokeless tobacco: Never Used     Alcohol use 1.5 oz/week     3 Standard drinks or equivalent per week      Comment: wine   Yoga twice a week,     ROS:  Full review of systems taken with the help of the intake sheet. Otherwise a complete 14 point review of systems was taken and is negative unless stated in the history above.      Physical Exam:   Blood pressure 127/82, pulse 67, temperature 97.8  F (36.6  C), height 1.651 m (5' 5\"), weight 76.7 kg (169 lb 2 oz), SpO2 98 %,   General: well appearing, no acute distress, pleasant and conversant,   Mental Status/neuro: alert and oriented  Face: symmetrical, normal facial color  Eyes: anicteric, PERRL, no proptosis or lid lag  Neck: suppler, no lymphadenopahty  Thyroid: normal size and texture, no nodule palpable, no bruits  Back: no scoliasis, no kyphosis  eart: regular rhythm, S1S2, no murmur " appreciated  Lung: clear to auscultation bilaterally  Abdomen: soft, NT/ND, no hepatomegaly  Legs: no swelling or edema  Feet: no deformities or ulcers, 2+ DP pulses, normal monofilament sensation      Labs : I reviewed data from epic and extract and summarize the pertinent data here.        4/21/2015 15:09 5/31/2016 10:23 7/17/2017 10:13 8/14/2017 14:52 9/11/2017 09:31   Calcium 10.5 (H) 9.7 9.9 10.3 (H) 10.2 (H)        9/11/2017 09:31   Parathyroid Hormone Intact 120 (H)        9/11/2017 09:31   Urea Nitrogen 12   Creatinine 0.58   GFR Estimate >90   GFR Estimate If Black >90        9/11/2017 09:31   Vitamin D Deficiency screening 30        5/31/2016 10:23   TSH 1.83       Lab Results   Component Value Date     09/11/2017      Lab Results   Component Value Date    POTASSIUM 4.0 09/11/2017     Lab Results   Component Value Date    CHLORIDE 100 09/11/2017     Lab Results   Component Value Date    KWAME 10.2 09/11/2017     Lab Results   Component Value Date    CO2 28 09/11/2017     Lab Results   Component Value Date    BUN 12 09/11/2017     Lab Results   Component Value Date    CR 0.58 09/11/2017     Lab Results   Component Value Date    GLC 95 09/11/2017     Lab Results   Component Value Date    TSH 1.83 05/31/2016     Bone density 12/1/2017: I personally reviewed the original images and agree with the below reports.       BONE DENSITOMETRY  [address] [address]  12/1/2017       FINDINGS:               Lumbar Spine (L1-L2) T-score: -0.9, marked degenerative changes present at L3,4, so only L1,2 are evaluated.                Left Femoral Neck T-score:  -1.9               Right Femoral Neck T-score:  -1.4                             Lumbar (L1-L2) BMD: 1.057            Previous: 1.094                                              Total Hip Mean BMD: 0.865            Previous: 0.907      Comparison is made to another DXA performed on the same Lun[employer]dig[employer]  machine on  05/01/2014.       IMPRESSION  Osteopenia., Degenerative changes of the lumbar spine which may falsely elevate results.          Assessment and Plan  72 year old female with mildly elevated Ca, PTH, osteopenia, asymptomatic, no history of kidney stones  #Possible primary hyperparathyroidism, however asymptomatic, only mild elevation of calcium, no osteoporosis but osteopenia, no history of kidney stone, at this point I do not think there is a indication for surgery, start a low-dose of calcium for bone protection and we will monitor    - PTH, Ca, Vitamin D 25, 1-25, and 24 hour urine caclium  - Calcium citrate 630 mg for osteopenia  - RTC with me in 5-6 month    --- Addendum ---  Currently normo-calcemia, we will observe. I called and left message.   1/19/2018 12:19   Sodium 137   Potassium 3.6   Chloride 102   Carbon Dioxide 29   Urea Nitrogen 11   Creatinine 0.46 (L)   GFR Estimate >90   GFR Estimate If Black >90   Calcium 9.9   Anion Gap 6   Glucose 125 (H)     I spent 45 minutes with this patient face to face and explained the conditions and plans (more than 50% of time was counseling/coordination of care) . The patient understood and is satisfied with today's visit. Return to clinic with me in 6 months.         Pat Pack MD  Staff Physician  Endocrinology and Metabolism  License: [id number]

## 2018-01-23 DIAGNOSIS — E21.0 PRIMARY HYPERPARATHYROIDISM (H): ICD-10-CM

## 2018-01-23 LAB
DEPRECATED CALCIDIOL+CALCIFEROL SERPL-MC: <30 UG/L (ref 20–75)
VITAMIN D2 SERPL-MCNC: <5 UG/L
VITAMIN D3 SERPL-MCNC: 25 UG/L

## 2018-01-23 PROCEDURE — 81050 URINALYSIS VOLUME MEASURE: CPT | Performed by: INTERNAL MEDICINE

## 2018-01-23 PROCEDURE — 82340 ASSAY OF CALCIUM IN URINE: CPT | Performed by: INTERNAL MEDICINE

## 2018-01-23 PROCEDURE — 82570 ASSAY OF URINE CREATININE: CPT | Performed by: INTERNAL MEDICINE

## 2018-01-24 LAB
1,25(OH)2D SERPL-MCNC: 68.6 PG/ML (ref 19.9–79.3)
CALCIUM 24H UR-MRATE: 0.16 G/24 H (ref 0.1–0.3)
CALCIUM UR-MCNC: 13.5 MG/DL
CALCIUM/CREAT UR: 0.15 G/G CR
COLLECT DURATION TIME UR: 24 H
CREAT 24H UR-MRATE: 1.13 G/(24.H) (ref 0.8–1.8)
CREAT UR-MCNC: 92 MG/DL
SPECIMEN VOL UR: 1225 ML

## 2018-02-10 ENCOUNTER — DOCUMENTATION ONLY (OUTPATIENT)
Dept: ENDOCRINOLOGY | Facility: CLINIC | Age: 73
End: 2018-02-10

## 2018-02-10 NOTE — PROGRESS NOTES
Patient:  Tracie Feliciano  :   1945  MRN:     [id number]        Ms.Sarah GREGORY Feliciano  [address] [facility] [address]        February 10, 2018    Dear ,    We are writing to inform you of your test results. As I left message before, by summarizing, Calcium normal, urine calcium test normal, active Vitamin D level normal, slight elevation of parathyroid hormone (PTH). These are the levels we can safely monitor. If you have any questions, please contact us.     Take care    Pat Pack MD      Resulted Orders   Parathyroid Hormone Intact   Result Value Ref Range    Parathyroid Hormone Intact 89 (H) 12 - 72 pg/mL   Basic metabolic panel   Result Value Ref Range    Sodium 137 133 - 144 mmol/L    Potassium 3.6 3.4 - 5.3 mmol/L    Chloride 102 94 - 109 mmol/L    Carbon Dioxide 29 20 - 32 mmol/L    Anion Gap 6 3 - 14 mmol/L    Glucose 125 (H) 70 - 99 mg/dL      Comment:      Non Fasting    Urea Nitrogen 11 7 - 30 mg/dL    Creatinine 0.46 (L) 0.52 - 1.04 mg/dL    GFR Estimate >90 >60 mL/min/1.7m2      Comment:      Non  GFR Calc    GFR Estimate If Black >90 >60 mL/min/1.7m2      Comment:       GFR Calc    Calcium 9.9 8.5 - 10.1 mg/dL   1,25 Dihydroxyvitamin D   Result Value Ref Range    1,25 Dihydroxyvitamin D 68.6 19.9 - 79.3 pg/mL   25 Hydroxyvitamin D2 and D3   Result Value Ref Range    25 OH Vit D2 <5 ug/L    25 OH Vit D3 25 ug/L    25 OH Vit D total <30 20 - 75 ug/L      Comment:      Season, race, dietary intake, and treatment affect the concentration of   25-hydroxy-Vitamin D. Values may decrease during winter months and increase   during summer months. Values 20-29 ug/L may indicate Vitamin D insufficiency   and values <20 ug/L may indicate Vitamin D deficiency.  This test was developed and its performance characteristics determined by the   [facility],  [facility]. It has   not been cleared or approved  by the FDA. The laboratory is regulated under   CLIA as qualified to perform high-complexity testing. This test is used for   clinical purposes. It should not be regarded as investigational or for   research.         Pat Pack MD

## 2018-05-02 ENCOUNTER — TRANSFERRED RECORDS (OUTPATIENT)
Dept: HEALTH INFORMATION MANAGEMENT | Facility: CLINIC | Age: 73
End: 2018-05-02

## 2018-06-20 ENCOUNTER — OFFICE VISIT (OUTPATIENT)
Dept: FAMILY MEDICINE | Facility: CLINIC | Age: 73
End: 2018-06-20
Payer: MEDICARE

## 2018-06-20 VITALS
WEIGHT: 167.2 LBS | SYSTOLIC BLOOD PRESSURE: 132 MMHG | BODY MASS INDEX: 27.86 KG/M2 | HEIGHT: 65 IN | TEMPERATURE: 97.3 F | HEART RATE: 80 BPM | OXYGEN SATURATION: 96 % | RESPIRATION RATE: 14 BRPM | DIASTOLIC BLOOD PRESSURE: 84 MMHG

## 2018-06-20 DIAGNOSIS — E78.5 HYPERLIPIDEMIA WITH TARGET LDL LESS THAN 130: ICD-10-CM

## 2018-06-20 DIAGNOSIS — Z00.00 ROUTINE GENERAL MEDICAL EXAMINATION AT A HEALTH CARE FACILITY: Primary | ICD-10-CM

## 2018-06-20 DIAGNOSIS — Z12.31 VISIT FOR SCREENING MAMMOGRAM: ICD-10-CM

## 2018-06-20 DIAGNOSIS — L98.9 SKIN LESION: ICD-10-CM

## 2018-06-20 DIAGNOSIS — D05.02 LOBULAR CARCINOMA IN SITU (LCIS) OF LEFT BREAST: ICD-10-CM

## 2018-06-20 DIAGNOSIS — E55.9 VITAMIN D DEFICIENCY: ICD-10-CM

## 2018-06-20 DIAGNOSIS — I10 HYPERTENSION GOAL BP (BLOOD PRESSURE) < 150/90: ICD-10-CM

## 2018-06-20 DIAGNOSIS — I87.2 VENOUS (PERIPHERAL) INSUFFICIENCY: ICD-10-CM

## 2018-06-20 DIAGNOSIS — M85.80 OSTEOPENIA, UNSPECIFIED LOCATION: ICD-10-CM

## 2018-06-20 DIAGNOSIS — E21.3 HYPERPARATHYROIDISM (H): ICD-10-CM

## 2018-06-20 LAB
ANION GAP SERPL CALCULATED.3IONS-SCNC: 8 MMOL/L (ref 3–14)
BUN SERPL-MCNC: 11 MG/DL (ref 7–30)
CALCIUM SERPL-MCNC: 10.1 MG/DL (ref 8.5–10.1)
CHLORIDE SERPL-SCNC: 102 MMOL/L (ref 94–109)
CHOLEST SERPL-MCNC: 164 MG/DL
CO2 SERPL-SCNC: 28 MMOL/L (ref 20–32)
CREAT SERPL-MCNC: 0.63 MG/DL (ref 0.52–1.04)
DEPRECATED CALCIDIOL+CALCIFEROL SERPL-MC: 18 UG/L (ref 20–75)
GFR SERPL CREATININE-BSD FRML MDRD: >90 ML/MIN/1.7M2
GLUCOSE SERPL-MCNC: 99 MG/DL (ref 70–99)
HDLC SERPL-MCNC: 54 MG/DL
LDLC SERPL CALC-MCNC: 83 MG/DL
NONHDLC SERPL-MCNC: 110 MG/DL
POTASSIUM SERPL-SCNC: 4 MMOL/L (ref 3.4–5.3)
PTH-INTACT SERPL-MCNC: 115 PG/ML (ref 18–80)
SODIUM SERPL-SCNC: 138 MMOL/L (ref 133–144)
TRIGL SERPL-MCNC: 136 MG/DL

## 2018-06-20 PROCEDURE — 82306 VITAMIN D 25 HYDROXY: CPT | Performed by: FAMILY MEDICINE

## 2018-06-20 PROCEDURE — 83970 ASSAY OF PARATHORMONE: CPT | Performed by: FAMILY MEDICINE

## 2018-06-20 PROCEDURE — 36415 COLL VENOUS BLD VENIPUNCTURE: CPT | Performed by: FAMILY MEDICINE

## 2018-06-20 PROCEDURE — 80048 BASIC METABOLIC PNL TOTAL CA: CPT | Performed by: FAMILY MEDICINE

## 2018-06-20 PROCEDURE — G0439 PPPS, SUBSEQ VISIT: HCPCS | Performed by: FAMILY MEDICINE

## 2018-06-20 PROCEDURE — 80061 LIPID PANEL: CPT | Performed by: FAMILY MEDICINE

## 2018-06-20 ASSESSMENT — ACTIVITIES OF DAILY LIVING (ADL)
CURRENT_FUNCTION: NO ASSISTANCE NEEDED
I_NEED_ASSISTANCE_FOR_THE_FOLLOWING_DAILY_ACTIVITIES:: NO ASSISTANCE IS NEEDED

## 2018-06-20 ASSESSMENT — PAIN SCALES - GENERAL: PAINLEVEL: NO PAIN (0)

## 2018-06-20 NOTE — PROGRESS NOTES
"  SUBJECTIVE:   Tracie Feliciano is a 73 year old female who presents for Preventive Visit.  {PVP to remind patient that this is not necessarily a physical exam; physical exam may or may not be done:898120::\"click delete button to remove this line now\"}  {PVP to inform patient that additional E&M charge may apply, if additional problems addressed:799083::\"click delete button to remove this line now\"}  Are you in the first 12 months of your Medicare Part B coverage?  {No Yes:077171::\"No\"}    Healthy Habits:    Do you get at least three servings of calcium containing foods daily (dairy, green leafy vegetables, etc.)? {YES/NO, DAIRY INTAKE:729687::\"yes\"}    Amount of exercise or daily activities, outside of work: {AMOUNT EXERCISE:924023}    Problems taking medications regularly {Yes /No default:825048::\"No\"}    Medication side effects: {Yes /No default.:359045::\"No\"}    Have you had an eye exam in the past two years? {YESNOBLANK:720339}    Do you see a dentist twice per year? {YESNOBLANK:726630}    Do you have sleep apnea, excessive snoring or daytime drowsiness?{YESNOBLANK:937418}      Ability to successfully perform activities of daily living: {YES/NO (MEDICARE):841259::\"Yes, no assistance needed\"}    Home safety:  {IPPE SAFETY CONCERNS:424602::\"none identified\"}     Hearing impairment: {NO/YES:330761}    Fall risk:  {Document Fall Risk in the Assessments Section of the Navigator:643786}    {If any of the above assessments are answered yes, consider ordering appropriate referrals (Optional):662934::\"click delete button to remove this line now\"}    {AWV Cognitive Screenin}    {Outside tests to abstract? :958008}    {additional problems to add (Optional):996101}    Reviewed and updated as needed this visit by clinical staff         Reviewed and updated as needed this visit by Provider        Social History   Substance Use Topics     Smoking status: Former Smoker     Packs/day: 0.50     Years: 1.00     Types: " "Cigarettes     Quit date: 1967     Smokeless tobacco: Never Used     Alcohol use 1.5 oz/week     3 Standard drinks or equivalent per week      Comment: wine       If you drink alcohol do you typically have >3 drinks per day or >7 drinks per week? {ETOH :552995}                        Today's PHQ-2 Score:   PHQ-2 (  [employer]) 2017 7/10/2017   Q1: Little interest or pleasure in doing things 0 0   Q2: Feeling down, depressed or hopeless 0 0   PHQ-2 Score 0 0     {PHQ-2 LOOK IN ASSESSMENTS (Optional) :201886}  Do you feel safe in your environment - {YES/NO/NA:119531}    Do you have a Health Care Directive?: {HEALTHCARE DIRECTIVE STATUS:535024}    Current providers sharing in care for this patient include:   Patient Care Team:  Melanie Patel MD as PCP - General    The following health maintenance items are reviewed in Epic and correct as of today:  Health Maintenance   Topic Date Due     MEDICARE ANNUAL WELLNESS VISIT  2017     LIPID MONITORING Q1 YEAR  2018     MAMMO Q1 YR  2018     FALL RISK ASSESSMENT  2018     BMP Q1 YR  2019     TETANUS IMMUNIZATION (SYSTEM ASSIGNED)  2019     DEXA Q2 YR  2019     ADVANCE DIRECTIVE PLANNING Q5 YRS  2021     COLON CANCER SCREEN (SYSTEM ASSIGNED)  2022     PNEUMOCOCCAL  Completed     INFLUENZA VACCINE  Completed     HEPATITIS C SCREENING  Completed     {Chronicprobdata (Optional):108315}    {Decision Support (Optional):283393}    ROS:  {ROS COMP:204468}    OBJECTIVE:   There were no vitals taken for this visit. Estimated body mass index is 28.14 kg/(m^2) as calculated from the following:    Height as of 18: 5' 5\" (1.651 m).    Weight as of 18: 169 lb 2 oz (76.7 kg).  EXAM:   {Exam :828746}    ASSESSMENT / PLAN:   {Diag Picklist:376979}    End of Life Planning:  Patient currently has an advanced directive: { :744385}    COUNSELING:  {Medicare Counselin}    {BP Counseling- Complete if BP >= 120/80  " "(Optional):164336}    Estimated body mass index is 28.14 kg/(m^2) as calculated from the following:    Height as of 1/19/18: 5' 5\" (1.651 m).    Weight as of 1/19/18: 169 lb 2 oz (76.7 kg).  {Weight Management Plan -- Complete if patient has an abnormal BMI (Optional):306768}     reports that she quit smoking about 51 years ago. Her smoking use included Cigarettes. She has a 0.50 pack-year smoking history. She has never used smokeless tobacco.  {Tobacco Cessation -- Complete if patient is a smoker (Optional):756431}    Appropriate preventive services were discussed with this patient, including applicable screening as appropriate for cardiovascular disease, diabetes, osteopenia/osteoporosis, and glaucoma.  As appropriate for age/gender, discussed screening for colorectal cancer, prostate cancer, breast cancer, and cervical cancer. Checklist reviewing preventive services available has been given to the patient.    Reviewed patients plan of care and provided an AVS. The {CarePlan:967442} for Tracie meets the Care Plan requirement. This Care Plan has been established and reviewed with the {PATIENT, FAMILY MEMBER, CAREGIVER:849148}.    Counseling Resources:  ATP IV Guidelines  Pooled Cohorts Equation Calculator  Breast Cancer Risk Calculator  FRAX Risk Assessment  ICSI Preventive Guidelines  Dietary Guidelines for Americans, 2010  USDA's MyPlate  ASA Prophylaxis  Lung CA Screening    Oumou Ledesma MD  [facility]  "

## 2018-06-20 NOTE — PATIENT INSTRUCTIONS
Preventive Health Recommendations  Female Ages 65 +    Yearly exam:     See your health care provider every year in order to  o Review health changes.   o Discuss preventive care.    o Review your medicines if your doctor has prescribed any.      You no longer need a yearly Pap test unless you've had an abnormal Pap test in the past 10 years. If you have vaginal symptoms, such as bleeding or discharge, be sure to talk with your provider about a Pap test.      Every 1 to 2 years, have a mammogram.  If you are over 69, talk with your health care provider about whether or not you want to continue having screening mammograms.      Every 10 years, have a colonoscopy. Or, have a yearly FIT test (stool test). These exams will check for colon cancer.       Have a cholesterol test every 5 years, or more often if your doctor advises it.       Have a diabetes test (fasting glucose) every three years. If you are at risk for diabetes, you should have this test more often.       At age 65, have a bone density scan (DEXA) to check for osteoporosis (brittle bone disease).    Shots:    Get a flu shot each year.    Get a tetanus shot every 10 years.    Talk to your doctor about your pneumonia vaccines. There are now two you should receive - Pneumovax (PPSV 23) and Prevnar (PCV 13).    Talk to your doctor about the shingles vaccine.    Talk to your doctor about the hepatitis B vaccine.    Nutrition:     Eat at least 5 servings of fruits and vegetables each day.      Eat whole-grain bread, whole-wheat pasta and brown rice instead of white grains and rice.      Talk to your provider about Calcium and Vitamin D.     Lifestyle    Exercise at least 150 minutes a week (30 minutes a day, 5 days a week). This will help you control your weight and prevent disease.      Limit alcohol to one drink per day.      No smoking.       Wear sunscreen to prevent skin cancer.       See your dentist twice a year for an exam and cleaning.      See your  eye doctor every 1 to 2 years to screen for conditions such as glaucoma, macular degeneration, cataracts, etc   Preventive Health Recommendations    Female Ages 65 +    Yearly exam:   See your health care provider every year in order to  Review health changes.   Discuss preventive care.    Review your medicines if your doctor has prescribed any.    You no longer need a yearly Pap test unless you've had an abnormal Pap test in the past 10 years. If you have vaginal symptoms, such as bleeding or discharge, be sure to talk with your provider about a Pap test.    Every 1 to 2 years, have a mammogram.  If you are over 69, talk with your health care provider about whether or not you want to continue having screening mammograms.    Every 10 years, have a colonoscopy. Or, have a yearly FIT test (stool test). These exams will check for colon cancer.     Have a cholesterol test every 5 years, or more often if your doctor advises it.     Have a diabetes test (fasting glucose) every three years. If you are at risk for diabetes, you should have this test more often.     At age 65, have a bone density scan (DEXA) to check for osteoporosis (brittle bone disease).    Shots:  Get a flu shot each year.  Get a tetanus shot every 10 years.  Talk to your doctor about your pneumonia vaccines. There are now two you should receive - Pneumovax (PPSV 23) and Prevnar (PCV 13).  Talk to your doctor about the shingles vaccine.  Talk to your doctor about the hepatitis B vaccine.    Nutrition:   Eat at least 5 servings of fruits and vegetables each day.    Eat whole-grain bread, whole-wheat pasta and brown rice instead of white grains and rice.    Talk to your provider about Calcium and Vitamin D.     Lifestyle  Exercise at least 150 minutes a week (30 minutes a day, 5 days a week). This will help you control your weight and prevent disease.    Limit alcohol to one drink per day.    No smoking.     Wear sunscreen to prevent skin cancer.     See  your dentist twice a year for an exam and cleaning.    See your eye doctor every 1 to 2 years to screen for conditions such as glaucoma, macular degeneration and cataracts.          Please get your mammogram at [facility]  Take care  Oumou Ledesma MD      [facility]    If you have any questions regarding to your visit please contact your care team:       Team Red:   Clinic Hours Telephone Number   Dr. Melanie Victor, NP   7am-7pm  Monday - Thursday   7am-5pm  Fridays  [phone]  (Appointment scheduling available 24/7)    Questions about your recent visit?   Team Line  [phone]   Urgent Care - [place] - 11am-9pm Monday-Friday Saturday-Sunday- 9am-5pm   [place] - 5pm-9pm Monday-Friday Saturday-Sunday- 9am-5pm  [phone] - Cristina Fernandez  [phone] - [place]       What options do I have for a visit other than an office visit? We offer electronic visits (e-visits) and telephone visits, when medically appropriate.  Please check with your medical insurance to see if these types of visits are covered, as you will be responsible for any charges that are not paid by your insurance.      You can use Faves (secure electronic communication) to access to your chart, send your primary care provider a message, or make an appointment. Ask a team member how to get started.     For a price quote for your services, please call our Consumer Price Line at [phone] or our Imaging Cost estimation line at [phone] (for imaging tests).        MARQUIS Hoskins

## 2018-06-20 NOTE — MR AVS SNAPSHOT
After Visit Summary   6/20/2018    Tracie Feliciano    MRN: [id number]           Patient Information     Date Of Birth          1945        Visit Information        Provider Department      6/20/2018 9:20 AM Oumou Ledesma MD [facility]        Today's Diagnoses     Routine general medical examination at a health care facility    -  1    Lobular carcinoma in situ (LCIS) of left breast        Visit for screening mammogram        Osteopenia, unspecified location        Hypertension goal BP (blood pressure) < 150/90        Hyperlipidemia with target LDL less than 130        Skin lesion        Vitamin D deficiency          Care Instructions      Preventive Health Recommendations  Female Ages 65 +    Yearly exam:     See your health care provider every year in order to  o Review health changes.   o Discuss preventive care.    o Review your medicines if your doctor has prescribed any.      You no longer need a yearly Pap test unless you've had an abnormal Pap test in the past 10 years. If you have vaginal symptoms, such as bleeding or discharge, be sure to talk with your provider about a Pap test.      Every 1 to 2 years, have a mammogram.  If you are over 69, talk with your health care provider about whether or not you want to continue having screening mammograms.      Every 10 years, have a colonoscopy. Or, have a yearly FIT test (stool test). These exams will check for colon cancer.       Have a cholesterol test every 5 years, or more often if your doctor advises it.       Have a diabetes test (fasting glucose) every three years. If you are at risk for diabetes, you should have this test more often.       At age 65, have a bone density scan (DEXA) to check for osteoporosis (brittle bone disease).    Shots:    Get a flu shot each year.    Get a tetanus shot every 10 years.    Talk to your doctor about your pneumonia vaccines. There are now two you should receive - Pneumovax (PPSV 23) and  Prevnar (PCV 13).    Talk to your doctor about the shingles vaccine.    Talk to your doctor about the hepatitis B vaccine.    Nutrition:     Eat at least 5 servings of fruits and vegetables each day.      Eat whole-grain bread, whole-wheat pasta and brown rice instead of white grains and rice.      Talk to your provider about Calcium and Vitamin D.     Lifestyle    Exercise at least 150 minutes a week (30 minutes a day, 5 days a week). This will help you control your weight and prevent disease.      Limit alcohol to one drink per day.      No smoking.       Wear sunscreen to prevent skin cancer.       See your dentist twice a year for an exam and cleaning.      See your eye doctor every 1 to 2 years to screen for conditions such as glaucoma, macular degeneration, cataracts, etc   Preventive Health Recommendations    Female Ages 65 +    Yearly exam:   See your health care provider every year in order to  Review health changes.   Discuss preventive care.    Review your medicines if your doctor has prescribed any.    You no longer need a yearly Pap test unless you've had an abnormal Pap test in the past 10 years. If you have vaginal symptoms, such as bleeding or discharge, be sure to talk with your provider about a Pap test.    Every 1 to 2 years, have a mammogram.  If you are over 69, talk with your health care provider about whether or not you want to continue having screening mammograms.    Every 10 years, have a colonoscopy. Or, have a yearly FIT test (stool test). These exams will check for colon cancer.     Have a cholesterol test every 5 years, or more often if your doctor advises it.     Have a diabetes test (fasting glucose) every three years. If you are at risk for diabetes, you should have this test more often.     At age 65, have a bone density scan (DEXA) to check for osteoporosis (brittle bone disease).    Shots:  Get a flu shot each year.  Get a tetanus shot every 10 years.  Talk to your doctor about  your pneumonia vaccines. There are now two you should receive - Pneumovax (PPSV 23) and Prevnar (PCV 13).  Talk to your doctor about the shingles vaccine.  Talk to your doctor about the hepatitis B vaccine.    Nutrition:   Eat at least 5 servings of fruits and vegetables each day.    Eat whole-grain bread, whole-wheat pasta and brown rice instead of white grains and rice.    Talk to your provider about Calcium and Vitamin D.     Lifestyle  Exercise at least 150 minutes a week (30 minutes a day, 5 days a week). This will help you control your weight and prevent disease.    Limit alcohol to one drink per day.    No smoking.     Wear sunscreen to prevent skin cancer.     See your dentist twice a year for an exam and cleaning.    See your eye doctor every 1 to 2 years to screen for conditions such as glaucoma, macular degeneration and cataracts.          Please get your mammogram at [facility]  Take care  Oumou Ledesma MD      [facility]    If you have any questions regarding to your visit please contact your care team:       Team Red:   Clinic Hours Telephone Number   Dr. Melanie Victor, NP   7am-7pm  Monday - Thursday   7am-5pm  Fridays  [phone]  (Appointment scheduling available 24/7)    Questions about your recent visit?   Team Line  [phone]   [facility] [place] [facility] - 11am-9pm Monday-Friday Saturday-Sunday- 9am-5pm   [place] - 5pm-9pm Monday-Friday Saturday-Sunday- 9am-5pm  [phone] - Cristina Fernandez  [phone] - [place]       What options do I have for a visit other than an office visit? We offer electronic visits (e-visits) and telephone visits, when medically appropriate.  Please check with your medical insurance to see if these types of visits are covered, as you will be responsible for any charges that are not paid by your insurance.      You can use [employer] (secure electronic communication) to  access to your chart, send your primary care provider a message, or make an appointment. Ask a team member how to get started.     For a price quote for your services, please call our Consumer Price Line at [phone] or our Imaging Cost estimation line at [phone] (for imaging tests).        MARQUIS Hoskins            Follow-ups after your visit        Additional Services     DERMATOLOGY REFERRAL       Your provider has referred you to: [facility]: [phone]   [email or web address]  N: [facility] [place] [phone]   [email or web address]    Please be aware that coverage of these services is subject to the terms and limitations of your health insurance plan.  Call member services at your health plan with any benefit or coverage questions.      Please bring the following to your appointment:  Any x-rays, CTs or MRIs which have been performed.  Contact the facility where they were done to arrange for  prior to your scheduled appointment.  Any new CT, MRI or other procedures ordered by your specialist must be performed at a [place] facility or coordinated by your clinic's referral office.    List of current medications   This referral request   Any documents/labs given to you for this referral                  Your next 10 appointments already scheduled     Sep 28, 2018 10:00 AM CDT   [facility] with Pat Pack MD   [facility]    [address] [place] [id number] [phone]              Future tests that were ordered for you today     Open Future Orders        Priority Expected Expires Ordered    MA SCREENING DIGITAL BILAT - Future  (s+30) Routine  6/20/2019 6/20/2018            Who to contact     If you have questions or need follow up information about today's clinic visit or your schedule please contact [place]  "[facility] directly at [phone].  Normal or non-critical lab and imaging results will be communicated to you by MyChart, letter or phone within 4 business days after the clinic has received the results. If you do not hear from us within 7 days, please contact the clinic through MyChart or phone. If you have a critical or abnormal lab result, we will notify you by phone as soon as possible.  Submit refill requests through [employer]erahart or call your pharmacy and they will forward the refill request to us. Please allow 3 business days for your refill to be completed.          Additional Information About Your Visit        Care EveryWhere ID     This is your Care EveryWhere ID. This could be used by other organizations to access your [facility] medical records  [id number]        Your Vitals Were     Pulse Temperature Respirations Height Pulse Oximetry BMI (Body Mass Index)    80 97.3  F (36.3  C) (Oral) 14 5' 5\" (1.651 m) 96% 27.82 kg/m2       Blood Pressure from Last 3 Encounters:   06/20/18 132/84   01/19/18 127/82   12/21/17 110/70    Weight from Last 3 Encounters:   06/20/18 167 lb 3.2 oz (75.8 kg)   01/19/18 169 lb 2 oz (76.7 kg)   12/21/17 168 lb (76.2 kg)              We Performed the Following     Basic metabolic panel     DERMATOLOGY REFERRAL     Lipid panel reflex to direct LDL Fasting     Vitamin D Deficiency        Primary Care Provider Office Phone # Fax #    Melanie Patel [phone]       [address] [place] [address] [address]        Equal Access to Services     [facility] Hospital[facility]SEPH[facility] : Hadii marlon ku hadasho Soomaali, waaxda luqadaha, qaybta kaalmada manisha omer . So [facility] [phone].    ATENCIÓN: Si habla español, tiene a barbosa disposición servicios gratuitos de asistencia lingüística. Llame al [phone].    We comply with applicable federal civil rights laws and [place] laws. We do not discriminate on the basis of race, color, national origin, age, " disability, sex, sexual orientation, or gender identity.            Thank you!     Thank you for choosing [facility]  for your care. Our goal is always to provide you with excellent care. Hearing back from our patients is one way we can continue to improve our services. Please take a few minutes to complete the written survey that you may receive in the mail after your visit with us. Thank you!             Your Updated Medication List - Protect others around you: Learn how to safely use, store and throw away your medicines at www.disposemymeds.org.          This list is accurate as of 6/20/18 10:12 AM.  Always use your most recent med list.                   Brand Name Dispense Instructions for use Diagnosis    fexofenadine 180 MG tablet    ALLEGRA     Take 1 tablet (180 mg) by mouth daily as needed for allergies        fluticasone 50 MCG/ACT spray    FLONASE     Spray 1-2 sprays into both nostrils daily PRN        losartan-hydrochlorothiazide 50-12.5 MG per tablet    HYZAAR    90 tablet    Take 1 tablet by mouth daily    Hypertension goal BP (blood pressure) < 150/90       simvastatin 40 MG tablet    ZOCOR    90 tablet    Take 1 tablet (40 mg) by mouth daily    Hyperlipidemia with target LDL less than 130       tamoxifen 20 MG tablet    NOLVADEX     Take 20 mg by mouth daily        timolol 0.5 % ophthalmic solution    TIMOPTIC     PLACE 1 DROP INTO THE LEFT EYE QAM        vitamin D 2000 units tablet      Take 2,000 Units by mouth daily

## 2018-06-20 NOTE — LETTER
[address] [place] [address]    June 21, 2018    Tracie GREGORY Feliciano  [address] [facility] [address]          Dear Tracie,    Vitamin D levels are Low   Please take  4000 units Vitamin D daily   Cholesterol is good    Enclosed is a copy of your results.     Results for orders placed or performed in visit on 06/20/18   Lipid panel reflex to direct LDL Fasting   Result Value Ref Range    Cholesterol 164 <200 mg/dL    Triglycerides 136 <150 mg/dL    HDL Cholesterol 54 >49 mg/dL    LDL Cholesterol Calculated 83 <100 mg/dL    Non HDL Cholesterol 110 <130 mg/dL   Basic metabolic panel   Result Value Ref Range    Sodium 138 133 - 144 mmol/L    Potassium 4.0 3.4 - 5.3 mmol/L    Chloride 102 94 - 109 mmol/L    Carbon Dioxide 28 20 - 32 mmol/L    Anion Gap 8 3 - 14 mmol/L    Glucose 99 70 - 99 mg/dL    Urea Nitrogen 11 7 - 30 mg/dL    Creatinine 0.63 0.52 - 1.04 mg/dL    GFR Estimate >90 >60 mL/min/1.7m2    GFR Estimate If Black >90 >60 mL/min/1.7m2    Calcium 10.1 8.5 - 10.1 mg/dL   Vitamin D Deficiency   Result Value Ref Range    Vitamin D Deficiency screening 18 (L) 20 - 75 ug/L   Parathyroid Hormone Intact   Result Value Ref Range    Parathyroid Hormone Intact 115 (H) 18 - 80 pg/mL       If you have any questions or concerns, please call myself or my nurse at [phone].      Sincerely,        Oumou Ledesma MD/radha

## 2018-06-20 NOTE — PROGRESS NOTES
SUBJECTIVE:   Tracie Feliciano is a 73 year old female who presents for Preventive Visit.    Are you in the first 12 months of your Medicare coverage?      Physical   Annual:     Getting at least 3 servings of Calcium per day::  NO    Bi-annual eye exam::  Yes    Dental care twice a year::  Yes    Sleep apnea or symptoms of sleep apnea::  None    Diet::  Regular (no restrictions)    Taking medications regularly::  Yes    Medication side effects::  Not applicable    Additional concerns today::  YES    Ability to successfully perform activities of daily living: no assistance needed  Home Safety:  Lack of grab bars in the bathroom  Hearing Impairment: no hearing concerns    Fall risk:  Fallen 2 or more times in the past year?: No  Any fall with injury in the past year?: No    COGNITIVE SCREEN  1) Repeat 3 items (Banana, Sunrise, Chair)    2) Clock draw: NORMAL  3) 3 item recall: Recalls 3 objects  Results: 3 items recalled: COGNITIVE IMPAIRMENT LESS LIKELY    Mini-CogTM Copyright DAGOB[employer]. Licensed by the author for use in [facility]; reprinted with permission (alice@[facility]). All rights reserved.        Reviewed and updated as needed this visit by clinical staff         Reviewed and updated as needed this visit by Provider        Social History   Substance Use Topics     Smoking status: Former Smoker     Packs/day: 0.50     Years: 1.00     Types: Cigarettes     Quit date: 1/1/1967     Smokeless tobacco: Never Used     Alcohol use 1.5 oz/week     3 Standard drinks or equivalent per week      Comment: wine       Alcohol Use 6/20/2018   If you drink alcohol do you typically have greater than 3 drinks per day OR greater than 7 drinks per week? No           Hyperlipidemia Follow-Up      Rate your low fat/cholesterol diet?: good    Taking statin?  Yes, no muscle aches from statin    Other lipid medications/supplements?:  none    Hypertension Follow-up      Outpatient blood pressures are not being checked.    Low  Salt Diet: no added salt    Pt has Lobular carcinoma in situ and on Tamoxifen -she is due for annual mammogram in 1 month    Today's PHQ-2 Score:   PHQ-2 ( 1999 [employer]) 6/20/2018   Q1: Little interest or pleasure in doing things 0   Q2: Feeling down, depressed or hopeless 0   PHQ-2 Score 0   Q1: Little interest or pleasure in doing things Not at all   Q2: Feeling down, depressed or hopeless Not at all   PHQ-2 Score 0       Do you feel safe in your environment - Yes    Do you have a Health Care Directive?: Yes: Patient states has Advance Directive and will bring in a copy to clinic.    Current providers sharing in care for this patient include:   Patient Care Team:  Melanie Patel MD as PCP - General    The following health maintenance items are reviewed in Epic and correct as of today:  Health Maintenance   Topic Date Due     PHQ-2 Q1 YR  03/22/1957     MEDICARE ANNUAL WELLNESS VISIT  06/06/2017     LIPID MONITORING Q1 YEAR  07/17/2018     MAMMO Q1 YR  07/18/2018     FALL RISK ASSESSMENT  08/14/2018     BMP Q1 YR  01/19/2019     TETANUS IMMUNIZATION (SYSTEM ASSIGNED)  04/30/2019     DEXA Q2 YR  12/01/2019     ADVANCE DIRECTIVE PLANNING Q5 YRS  06/06/2021     COLON CANCER SCREEN (SYSTEM ASSIGNED)  06/21/2022     PNEUMOCOCCAL  Completed     INFLUENZA VACCINE  Completed     HEPATITIS C SCREENING  Completed     Labs reviewed in EPIC  BP Readings from Last 3 Encounters:   06/20/18 132/84   01/19/18 127/82   12/21/17 110/70    Wt Readings from Last 3 Encounters:   06/20/18 167 lb 3.2 oz (75.8 kg)   01/19/18 169 lb 2 oz (76.7 kg)   12/21/17 168 lb (76.2 kg)                  Patient Active Problem List   Diagnosis     Allergic rhinitis     Hyperlipidemia with target LDL less than 130     Hypertension goal BP (blood pressure) < 150/90     Osteopenia     Advanced directives, counseling/discussion     Cataract     Vitamin D deficiency     Venous (peripheral) insufficiency     Breast neoplasm, Tis (LCIS), left      Hyperparathyroidism (H)     Lobular carcinoma in situ (LCIS) of left breast     Past Surgical History:   Procedure Laterality Date     C/SECTION, LOW TRANSVERSE  4/2/80     COLONOSCOPY  6/21/2012    Procedure: COLONOSCOPY;  COLONOSCOPY, SCREEN, PREVIOUS POLYP;  Surgeon: Maverick Maradiaga MD;  Location: [facility] OR     EYE SURGERY Bilateral 2017    cataract     HYSTERECTOMY, PAP NO LONGER INDICATED  9/30/09    BSO      LUMPECTOMY BREAST Left 08/18/2017       Social History   Substance Use Topics     Smoking status: Former Smoker     Packs/day: 0.50     Years: 1.00     Types: Cigarettes     Quit date: 1/1/1967     Smokeless tobacco: Never Used     Alcohol use 1.5 oz/week     3 Standard drinks or equivalent per week      Comment: wine     Family History   Problem Relation Age of Onset     Cancer Mother      d. pelvic     C.A.D. Mother      ?     C.A.D. Father 79     MI, d.     Alzheimer Disease Father      GASTROINTESTINAL DISEASE Father      PUD     Cancer Maternal Aunt      GI?     Diabetes No family hx of          Current Outpatient Prescriptions   Medication Sig Dispense Refill     Cholecalciferol (VITAMIN D) 2000 UNITS tablet Take 2,000 Units by mouth daily        fluticasone (FLONASE) 50 MCG/ACT spray Spray 1-2 sprays into both nostrils daily PRN       losartan-hydrochlorothiazide (HYZAAR) 50-12.5 MG per tablet Take 1 tablet by mouth daily 90 tablet 3     simvastatin (ZOCOR) 40 MG tablet Take 1 tablet (40 mg) by mouth daily 90 tablet 3     tamoxifen (NOLVADEX) 20 MG tablet Take 20 mg by mouth daily       timolol (TIMOPTIC) 0.5 % ophthalmic solution PLACE 1 DROP INTO THE LEFT EYE QAM  3     fexofenadine (ALLEGRA) 180 MG tablet Take 1 tablet (180 mg) by mouth daily as needed for allergies       Allergies   Allergen Reactions     Aspirin      Reactive gastropathy     Contrast Dye      sneezing     Lisinopril Cough     Recent Labs   Lab Test  01/19/18   1219  09/11/17   0931   07/17/17   1013  05/31/16   1023   04/21/15   1509  05/01/14   1239   05/20/13   1154   09/09/11   0958  05/04/11   1253   LDL   --    --    --   81  110*  74  78   --   62   < >  81  70   HDL   --    --    --   56   --   63  48*   --   49*   < >  54  57   TRIG   --    --    --   144   --   158*  95   --   179*   < >  101  170*   ALT   --    --    --    --   36   --    --    --    --    --   22  22   CR  0.46*  0.58   < >  0.54  0.60  0.56  0.61   < >  0.58   < >   --   0.60   GFRESTIMATED  >90  >90   < >  >90  Non  GFR Calc    >90  Non  GFR Calc    >90  Non  GFR Calc    >90   < >  >90   < >   --   >90   GFRESTBLACK  >90  >90   < >  >90   GFR Calc    >90   GFR Calc    >90   GFR Calc    >90   < >  >90   < >   --   >90   POTASSIUM  3.6  4.0   < >  3.7  4.1  3.5  4.0   < >  4.2   < >   --   3.9   TSH   --    --    --    --   1.83   --    --    --   2.16   --    --    --     < > = values in this interval not displayed.            Review of Systems  CONSTITUTIONAL: NEGATIVE for fever, chills, change in weight  INTEGUMENTARY/SKIN: NEGATIVE for worrisome rashes, moles or lesions  EYES: NEGATIVE for vision changes or irritation  ENT/MOUTH: NEGATIVE for ear, mouth and throat problems  RESP: NEGATIVE for significant cough or SOB  BREAST: NEGATIVE for masses, tenderness or discharge  CV: NEGATIVE for chest pain, palpitations or peripheral edema  GI: NEGATIVE for nausea, abdominal pain, heartburn, or change in bowel habits  : NEGATIVE for frequency, dysuria, or hematuria  MUSCULOSKELETAL: NEGATIVE for significant arthralgias or myalgia  NEURO: NEGATIVE for weakness, dizziness or paresthesias  ENDOCRINE: NEGATIVE for temperature intolerance, skin/hair changes  HEME: NEGATIVE for bleeding problems  PSYCHIATRIC: NEGATIVE for changes in mood or affect    OBJECTIVE:   There were no vitals taken for this visit. Estimated body mass index is 28.14 kg/(m^2) as calculated  "from the following:    Height as of 1/19/18: 5' 5\" (1.651 m).    Weight as of 1/19/18: 169 lb 2 oz (76.7 kg).  Physical Exam  GENERAL: healthy, alert and no distress  EYES: Eyes grossly normal to inspection, PERRL and conjunctivae and sclerae normal  HENT: ear canals and TM's normal, nose and mouth without ulcers or lesions  NECK: no adenopathy, no asymmetry, masses, or scars and thyroid normal to palpation  RESP: lungs clear to auscultation - no rales, rhonchi or wheezes  BREAST: normal without masses, tenderness or nipple discharge and no palpable axillary masses or adenopathy  CV: regular rate and rhythm, normal S1 S2, no S3 or S4, no murmur, click or rub, no peripheral edema and peripheral pulses strong  ABDOMEN: soft, nontender, no hepatosplenomegaly, no masses and bowel sounds normal  MS: no gross musculoskeletal defects noted, no edema  SKIN: no suspicious lesions or rashes  NEURO: Normal strength and tone, mentation intact and speech normal  PSYCH: mentation appears normal, affect normal/bright    ASSESSMENT / PLAN:   1. Routine general medical examination at a health care facility    - Lipid panel reflex to direct LDL Fasting    2. Lobular carcinoma in situ (LCIS) of left breast  Advised annual mammogram-she will get it at     3. Osteopenia, unspecified location  Daily Exercise    4. Hypertension goal BP (blood pressure) < 150/90  controlled  - Basic metabolic panel    5. Hyperparathyroidism (H)  Labs pending     6. Hyperlipidemia with target LDL less than 130  Doing well    7. Skin lesion  Referral derm check-lesion left lower leg -dark Pigmented-pt has had it for years and no change  - DERMATOLOGY REFERRAL    8. Vitamin D deficiency    - Vitamin D Deficiency  - Parathyroid Hormone Intact    9. Venous (peripheral) insufficiency  Advised elevate legs/karri Hose    10. Visit for screening mammogram  Advised   - MA SCREENING DIGITAL BILAT - Future  (s+30); Future    End of Life Planning:  Patient currently " "has an advanced directive: pt will bring copy    COUNSELING:  Reviewed preventive health counseling, as reflected in patient instructions       Regular exercise       Healthy diet/nutrition       Vision screening       Hearing screening       Dental care       Osteoporosis Prevention/Bone Health       Colon cancer screening       The 10-year ASCVD risk score (Justyna CLEARY Jr, et al., 2013) is: 14.1%    Values used to calculate the score:      Age: 73 years      Sex: Female      Is Non- : No      Diabetic: No      Tobacco smoker: No      Systolic Blood Pressure: 132 mmHg      Is BP treated: Yes      HDL Cholesterol: 56 mg/dL      Total Cholesterol: 166 mg/dL       Advanced Planning         Estimated body mass index is 28.14 kg/(m^2) as calculated from the following:    Height as of 1/19/18: 5' 5\" (1.651 m).    Weight as of 1/19/18: 169 lb 2 oz (76.7 kg).     reports that she quit smoking about 51 years ago. Her smoking use included Cigarettes. She has a 0.50 pack-year smoking history. She has never used smokeless tobacco.      Appropriate preventive services were discussed with this patient, including applicable screening as appropriate for cardiovascular disease, diabetes, osteopenia/osteoporosis, and glaucoma.  As appropriate for age/gender, discussed screening for colorectal cancer, prostate cancer, breast cancer, and cervical cancer. Checklist reviewing preventive services available has been given to the patient.    Reviewed patients plan of care and provided an AVS. The Intermediate Care Plan ( asthma action plan, low back pain action plan, and migraine action plan) for Tracie meets the Care Plan requirement. This Care Plan has been established and reviewed with the Patient.    Counseling Resources:  ATP IV Guidelines  Pooled Cohorts Equation Calculator  Breast Cancer Risk Calculator  FRAX Risk Assessment  ICSI Preventive Guidelines  Dietary Guidelines for Americans, 2010  USDA's MyPlate  ASA " Prophylaxis  Lung CA Screening    Oumou Ledesma MD  [facility] [place]

## 2018-06-26 NOTE — MR AVS SNAPSHOT
After Visit Summary   10/9/2017    Tracie Feliciano    MRN: [id number]           Patient Information     Date Of Birth          1945        Visit Information        Provider Department      10/9/2017 9:40 AM Melanie Patel MD [facility]        Today's Diagnoses     Preop general physical exam    -  1    Cataract, unspecified cataract type, unspecified laterality        Hypertension goal BP (blood pressure) < 150/90        Hyperparathyroidism (H)        Asymptomatic postmenopausal status          Care Instructions    [facility]    If you have any questions regarding to your visit please contact your care team:       Team Red:   Clinic Hours Telephone Number   Dr. Melanie Dc  (pediatrics)  Jaci Victor NP 7am-7pm  Monday - Thursday   7am-5pm  Fridays  [phone] (fax)    Jennifer ANDUJAR  [phone]   Urgent Care - [place] and [place] Monday-Friday  [place] - 11am-8pm  Saturday-Sunday  Both sites - 9am-5pm  [phone] - [facility]  [phone] - [place]       What options do I have for visits at the clinic other than the traditional office visit?  To expand how we care for you, many of our providers are utilizing electronic visits (e-visits) and telephone visits, when medically appropriate, for interactions with their patients rather than a visit in the clinic.   We also offer nurse visits for many medical concerns. Just like any other service, we will bill your insurance company for this type of visit based on time spent on the phone with your provider. Not all insurance companies cover these visits. Please check with your medical insurance if this type of visit is covered. You will be responsible for any charges that are not paid by your insurance.      E-visits via Inaika:  generally incur a $35.00 fee.  Telephone visits:  Time spent on the phone: *charged based on time that is spent on the  [facility]  Heart Transplant  Progress Note    Patient Name: Sue Smith  MRN: [id number]  Admission Date: 6/22/2018  Hospital Length of Stay: 4 days  Attending Physician: Lola Paul MD  Primary Care Provider: Paddy Guerrier DO  Principal Problem:Acute on chronic diastolic heart failure    Subjective:     Interval History:  SOB and abdominal distention much improved from yesterday. Interested in speaking with palliative care. Will consult IR for paracentesis today for therapeutic tap.     Continuous Infusions:   treprostinil (REMODULIN) infusion 75 ng/kg/min (06/25/18 1454)    veletri/remodulin tubing       Scheduled Meds:   albuterol sulfate  2.5 mg Nebulization Q4H    amLODIPine  5 mg Oral Daily    bumetanide  2 mg Oral BID    busPIRone  15 mg Oral TID    desvenlafaxine succinate  100 mg Oral Daily    enoxaparin  40 mg Subcutaneous Daily    fluticasone-vilanterol  1 puff Inhalation Daily    lamoTRIgine  100 mg Oral BID    levothyroxine  75 mcg Oral Before breakfast    lurasidone  80 mg Oral QHS    mupirocin   Topical (Top) BID    pantoprazole  40 mg Oral Daily    pravastatin  40 mg Oral Daily    riociguat  1 mg Oral TID    sodium chloride 0.9%  3 mL Intravenous Q8H    spironolactone  100 mg Oral Daily     PRN Meds:ALPRAZolam, HYDROcodone-acetaminophen, ondansetron    Review of patient's allergies indicates:   Allergen Reactions    Sulfa (sulfonamide antibiotics) Rash     Objective:     Vital Signs (Most Recent):  Temp: 98.3 °F (36.8 °C) (06/26/18 0814)  Pulse: 82 (06/26/18 1102)  Resp: 20 (06/26/18 0840)  BP: 120/61 (06/26/18 0814)  SpO2: (!) 91 % (06/26/18 0840) Vital Signs (24h Range):  Temp:  [97.6 °F (36.4 °C)-98.7 °F (37.1 °C)] 98.3 °F (36.8 °C)  Pulse:  [67-95] 82  Resp:  [14-20] 20  SpO2:  [88 %-97 %] 91 %  BP: ()/(54-63) 120/61     Patient Vitals for the past 72 hrs (Last 3 readings):   Weight   06/26/18 0502 86 kg (189 lb 9.5 oz)   06/25/18 0400 86.2 kg (190  lb 0.6 oz)   06/24/18 0346 89.5 kg (197 lb 5 oz)     Body mass index is 34.68 kg/m².      Intake/Output Summary (Last 24 hours) at 06/26/18 1126  Last data filed at 06/26/18 0511   Gross per 24 hour   Intake           996.83 ml   Output             1700 ml   Net          -703.17 ml     Hemodynamic Parameters:    Physical Exam   Constitutional: She is oriented to person, place, and time.   Neck: JVD (to the ear. about 15cm h20) present.   Cardiovascular: Normal rate and regular rhythm.  Exam reveals gallop (+ S3).    Pulmonary/Chest: Effort normal and breath sounds normal.   Abdominal: She exhibits distension (very). There is no tenderness. There is no guarding.   Musculoskeletal: She exhibits edema (Leg bilaterally +2). She exhibits no tenderness.   Neurological: She is alert and oriented to person, place, and time.   Skin: Skin is warm and dry.   Skin break left arm   Nursing note and vitals reviewed    Significant Labs:  CBC:    Recent Labs  Lab 06/22/18  1155   WBC 5.28   RBC 4.60   HGB 11.7*   HCT 38.2   *   MCV 83   MCH 25.4*   MCHC 30.6*     BNP:    Recent Labs  Lab 06/22/18  1155   *     CMP:    Recent Labs  Lab 06/22/18  1155  06/25/18  0426 06/25/18  0903 06/26/18  0439   GLU 90  < > 88 95 89   CALCIUM 9.8  < > 10.0 10.3 9.7   ALBUMIN 3.8  --   --   --   --    PROT 6.6  --   --   --   --      < > 139 138 135*   K 3.8  < > 2.9* 3.4* 3.3*   CO2 25  < > 33* 33* 30*     < > 94* 92* 92*   BUN 19  < > 18 17 25*   CREATININE 1.8*  < > 1.6* 1.6* 1.9*   ALKPHOS 210*  --   --   --   --    ALT 10  --   --   --   --    AST 12  --   --   --   --    BILITOT 0.7  --   --   --   --    < > = values in this interval not displayed.   Coagulation:   No results for input(s): PT, INR, APTT in the last 168 hours.  LDH:  No results for input(s): LDH in the last 72 hours.  Microbiology:  Microbiology Results (last 7 days)     ** No results found for the last 168 hours. **          I have reviewed all  phone in increments of 10 minutes. Estimated cost:   5-10 mins $30.00   11-20 mins. $59.00   21-30 mins. $85.00     As always, Thank you for trusting us with your health care needs!              Before Your Surgery      Call your surgeon if there is any change in your health. This includes signs of a cold or flu (such as a sore throat, runny nose, cough, rash or fever).    Do not smoke, drink alcohol or take over the counter medicine (unless your surgeon or primary care doctor tells you to) for the 24 hours before and after surgery.    If you take prescribed drugs: Follow your doctor s orders about which medicines to take and which to stop until after surgery.    Eating and drinking prior to surgery: follow the instructions from your surgeon    Take a shower or bath the night before surgery. Use the soap your surgeon gave you to gently clean your skin. If you do not have soap from your surgeon, use your regular soap. Do not shave or scrub the surgery site.  Wear clean pajamas and have clean sheets on your bed.     Discharged by Jazmyn Benitez MA.            Follow-ups after your visit        Follow-up notes from your care team     Return in about 9 months (around 7/17/2018) for Wellness visit (fasting labs up to one week prior).      Your next 10 appointments already scheduled     Jan 19, 2018 11:15 AM CST   New Visit with Pat Pack MD   [facility] ([facility])    [id number] [place] [id number] [phone]              Future tests that were ordered for you today     Open Future Orders        Priority Expected Expires Ordered    DEXA HIP/PELVIS/SPINE - Future Routine  10/4/2018 10/9/2017            Who to contact     If you have questions or need follow up information about today's clinic visit or your schedule please contact [facility] [place] directly at [phone].  Normal or non-critical lab and imaging results will be communicated to you by  "MyChart, letter or phone within 4 business days after the clinic has received the results. If you do not hear from us within 7 days, please contact the clinic through [email or web address] or phone. If you have a critical or abnormal lab result, we will notify you by phone as soon as possible.  Submit refill requests through [employer] or call your pharmacy and they will forward the refill request to us. Please allow 3 business days for your refill to be completed.          Additional Information About Your Visit        [email or web address] Information     [employer] lets you send messages to your doctor, view your test results, renew your prescriptions, schedule appointments and more. To sign up, go to [email or web address] . Click on \"Log in\" on the left side of the screen, which will take you to the Welcome page. Then click on \"Sign up Now\" on the right side of the page.     You will be asked to enter the access code listed below, as well as some personal information. Please follow the directions to create your username and password.     Your access code is: [email or web address]  Expires: 2018 10:01 AM     Your access code will  in 90 days. If you need help or a new code, please call your [place] clinic or [phone].        Care EveryWhere ID     This is your Care EveryWhere ID. This could be used by other organizations to access your [place] medical records  [id number]        Your Vitals Were     Pulse Temperature Height Pulse Oximetry BMI (Body Mass Index)       64 97.1  F (36.2  C) (Oral) 5' 5\" (1.651 m) 99% 28.79 kg/m2        Blood Pressure from Last 3 Encounters:   10/09/17 136/84   17 112/76   17 140/84    Weight from Last 3 Encounters:   10/09/17 173 lb (78.5 kg)   17 173 lb 6.4 oz (78.7 kg)   17 174 lb (78.9 kg)               Primary Care Provider Office Phone # Fax #    Melanie Patel [phone] [address] [facility]  [place] [address]        Equal Access to Services     [facility] " pertinent labs within the past 24 hours.    Estimated Creatinine Clearance: 33.7 mL/min (A) (based on SCr of 1.9 mg/dL (H)).    Diagnostic Results:        Assessment and Plan:     Ms. Smith is a 58 y.o. 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 Patient who is on Bumex 1mg daily, metolazone every other day and lasix push weekly  started noticing weight gain 5 days ago. Her appetite has also been decreasing despite not having nausea and vomiting. Her abdomen feel full all the time and is more distended than usual. Having difficulty laying down and sleeping on recliner  She does not know whether she consistently take metolazone and mention that visiting nurse keep tract of her medication. She also mention compliance with fluids and salt. She report about 10lbs weight gain and unequal edema to the legs with R more than left. She denies decrease in UOP, color change to the urine, cough or voice change. She was discharge ten days ago after a week of admission secondary to LE cellulitis and has been taking oral doxcycline. Her clinic visit labs showed acute FREDDY with creatinine level at 1.8 from 1.5  And BNP of 800s from 600s ten days ago. She was recommended to have in patient diuretics and medication dosage adjustment.  .    * Acute on chronic diastolic heart failure    - RHF secondary to PH and SHERIE  - D/C Lasix 20mg Gtt, hold daily metolazone   - will restart home dose of diuretics. Consider adding back metolazone after contraction alkalosis resolves. (-9 L in 3 days)  - ABD US positive for large ascities   - consulted IR for therapeutic tap, -1.8 L  - Low salt diet, I/O and weight monitoring.  - Monitor electrolytes and keep K >>  4 and mag >2  -GDMT- Aldactone only. No ACEI due to rosalie          Acute renal injury    - Baseline appears to be 1-1.5  - possibly related to pre- renal course        Lower extremity cellulitis    - on doxycycline 100mg BID ( now complete)  - wound care/dressing change per nurse.        Bipolar affective disorder    - continue PTA antipsychotics        Essential hypertension    - Well controlled currently with amlodipine. Monitor closely        Pulmonary hypertension, group 1    - continue remodulin 75ng and adempas 1mg TID  - oxygen use at baseline 3L continuous  - I/O and weight monitoring  - patient not a candidate for Lung transplant per Lung tx visit note  - Patient would like to go home with hospice. Appreciate palliative care assistance.               GIA DoanC  Heart Transplant  [facility]   [place]AR : Hadii marlon hills maggi [place], waaxda luqadaha, qaybta kaenrique tiffaniechristopherchantel, manisha ayers haykrystal almonteserafincrystal phillip. So [facility] [phone].    ATENCIÓN: Si habla español, tiene a barbosa disposición servicios gratuitos de asistencia lingüística. Llame al [phone].    We comply with applicable federal civil rights laws and [place] laws. We do not discriminate on the basis of race, color, national origin, age, disability, sex, sexual orientation, or gender identity.            Thank you!     Thank you for choosing [facility] [place]  for your care. Our goal is always to provide you with excellent care. Hearing back from our patients is one way we can continue to improve our services. Please take a few minutes to complete the written survey that you may receive in the mail after your visit with us. Thank you!             Your Updated Medication List - Protect others around you: Learn how to safely use, store and throw away your medicines at www.disposemymeds.org.          This list is accurate as of: 10/9/17 10:01 AM.  Always use your most recent med list.                   Brand Name Dispense Instructions for use Diagnosis    losartan-hydrochlorothiazide 50-12.5 MG per tablet    HYZAAR    90 tablet    Take 1 tablet by mouth daily    Hypertension goal BP (blood pressure) < 150/90       simvastatin 40 MG tablet    ZOCOR    90 tablet    Take 1 tablet (40 mg) by mouth daily    Hyperlipidemia with target LDL less than 130       timolol 0.5 % ophthalmic solution    TIMOPTIC     PLACE 1 DROP INTO THE LEFT EYE QAM        vitamin D 2000 UNITS tablet      Take 2,000 Units by mouth daily

## 2018-07-18 ENCOUNTER — TELEPHONE (OUTPATIENT)
Dept: FAMILY MEDICINE | Facility: CLINIC | Age: 73
End: 2018-07-18

## 2018-07-18 NOTE — TELEPHONE ENCOUNTER
Message left for patient to call the RN hotline # at [phone]    Here is a list of all the endocrinologists listed on our referral.    Your provider has referred you to: FMG: [facility] [phone]   [email or web address]/  FMG: [facility] [place] [phone]   [email or web address]  FMG: [facility] [phone]   [email or web address]/  FMG:  [facility]  [phone]  [email or web address]  FMG:  [facility] [phone]  [email or web address]  UMP: [facility] [place] [phone]   [email or web address]/  UMP: [facility] [phone]   [email or web address]/  FHN: Endocrinology Clinic [facility] [phone]   [email or web address]    [facility] [place] [phone]  [place] [phone]  [place] [phone]    [facility] [place] [phone]  [place] [phone]  [place] [phone]  [place] [phone]

## 2018-07-18 NOTE — TELEPHONE ENCOUNTER
Reason for Call:  Other appointment    Detailed comments: Patient has appt with Endocrinology in [place] for a recheck for her hypercalcemia, but is wondering if there is someone closer to [place] that you would recommend for her as [place] is not convenient. Dr. Stapleton here in [place] is out until 11/07/18 for appointments.Please advise    Phone Number Patient can be reached at: Home number on file [phone] (home)    Best Time: Any    Can we leave a detailed message on this number? YES    Call taken on 7/18/2018 at 2:58 PM by Delfina Galvan

## 2018-07-18 NOTE — TELEPHONE ENCOUNTER
Patient called back  She did not want to take down all of the numbers.  Listed off the locations to see which numbers she wanted to take but she declined all of the locations listed.  She then hung up the phone    Jeffery Singh RN

## 2018-07-31 NOTE — PROGRESS NOTES
"  SUBJECTIVE:   Tracie Feliciano is a 73 year old female who presents to clinic today for the following health issues:    Swollen ankles      Duration: several months    Description (location/character/radiation): bilateral    Intensity:  moderate    Accompanying signs and symptoms: see above    History (similar episodes/previous evaluation): talked to provider in past    Precipitating or alleviating factors: None    Therapies tried and outcome: watching salt intake and what kind of foods they eat     Hypertension well controlled on current medications without side effects, chest pain, or dyspnea. Hypercholesterolemia well controlled with current treatment plan without side effects. She has urinary frequency with occasional leakage.     She would like to see our endocrinologist for elevated PTH.     OBJECTIVE:     /74  Pulse 72  Temp 96.9  F (36.1  C) (Oral)  Resp 12  Ht 5' 5\" (1.651 m)  Wt 168 lb (76.2 kg)  SpO2 98%  BMI 27.96 kg/m2  Body mass index is 27.96 kg/(m^2).  GENERAL: healthy, alert and no distress  MS: woody bipedal edema    SKIN: scattered lower extremity varicosities and spider veins   NEURO: mentation intact  PSYCH: affect flat and anxious    Diagnostic Test Results:  Results for orders placed or performed in visit on 08/01/18   UA reflex to Microscopic and Culture   Result Value Ref Range    Color Urine Yellow     Appearance Urine Clear     Glucose Urine Negative NEG^Negative mg/dL    Bilirubin Urine Negative NEG^Negative    Ketones Urine Negative NEG^Negative mg/dL    Specific Gravity Urine <=1.005 1.003 - 1.035    Blood Urine Negative NEG^Negative    pH Urine 6.0 5.0 - 7.0 pH    Protein Albumin Urine Negative NEG^Negative mg/dL    Urobilinogen Urine 0.2 0.2 - 1.0 EU/dL    Nitrite Urine Negative NEG^Negative    Leukocyte Esterase Urine Negative NEG^Negative    Source Midstream Urine        ASSESSMENT/PLAN:   (I87.2) Venous (peripheral) insufficiency  (primary encounter diagnosis)  Plan: " VASCULAR SURGERY REFERRAL        Patient refuses compression stockings, and is already using elevation and low salt diet     (I10) Hypertension goal BP (blood pressure) < 150/90  Comment: Well controlled with medications without side effects.   Plan: losartan-hydrochlorothiazide (HYZAAR) 50-12.5         MG per tablet          (E78.5) Hyperlipidemia with target LDL less than 130  Comment: Well controlled with medications without side effects.   Plan: simvastatin (ZOCOR) 40 MG tablet          (E21.3) Hyperparathyroidism (H)  (E55.9) Vitamin D deficiency  (M85.80) Osteopenia, unspecified location  Plan: ENDOCRINOLOGY ADULT REFERRAL          (N39.41) Urge incontinence of urine  Plan: UA reflex to Microscopic and Culture        Discussed treatment options       See Patient Instructions    Melanie Patel MD  [facility]

## 2018-07-31 NOTE — PATIENT INSTRUCTIONS
[facility]    If you have any questions regarding to your visit please contact your care team:       Team Red:   Clinic Hours Telephone Number   Dr. Melanie Victor, NP   7am-7pm  Monday - Thursday   7am-5pm  Fridays  [phone]  (Appointment scheduling available 24/7)    Questions about your recent visit?   Team Line  [phone]   Urgent Care - [place] and [facility] - 11am-9pm Monday-Friday Saturday-Sunday- 9am-5pm   [place] - 5pm-9pm Monday-Friday Saturday-Sunday- 9am-5pm  [phone] - [place]  [phone] - [place]       What options do I have for a visit other than an office visit? We offer electronic visits (e-visits) and telephone visits, when medically appropriate.  Please check with your medical insurance to see if these types of visits are covered, as you will be responsible for any charges that are not paid by your insurance.      You can use [employer] (secure electronic communication) to access to your chart, send your primary care provider a message, or make an appointment. Ask a team member how to get started.     For a price quote for your services, please call our Consumer Price Line at [phone] or our Imaging Cost estimation line at [phone] (for imaging tests).  Allison HUFFMAN MA      It is recommended that you get a vaccination for shingles called Shingrix (given as 2 shots, 2 to 6 months apart), even if you have already had the Zostavax vaccine. Discuss getting the Shingix vaccine from your pharmacist, or schedule an ancillary shot visit here. Some insurances do not cover the cost of these vaccines.      Understanding Chronic Venous Insufficiency  Problems with the veins in the legs may lead to chronic venous insufficiency (CVI). CVI means that there is a long-term problem with the veins not being able to pump blood back to your heart. When this happens, blood stays in the legs and causes swelling  and aching.   Two problems that may lead to chronic venous insufficiency are:    Damaged valves. Valves keep blood flowing from the legs through the blood vessels and back to the heart. When the valves are damaged, blood does not flow as well.     Deep vein thrombosis (DVT). Blood clots may form in the deep veins of the legs. This may cause pain, redness, and swelling in the legs. It may also block the flow of blood back to the heart. Seek medical care right away if you have these symptoms.    A blood clot in the leg can also break off and travel to the lungs. This is called pulmonary embolism (PE). In the lungs, the clot can cut off the flow of blood. This may cause chest pain, trouble breathing, sweating, a fast heartbeat, coughing (may cough up blood), and fainting. It is a medical emergency and may cause death. Call 911 if you have these symptoms.    Healthcare providers call the two conditions, DVT and PE, venous thromboembolism (VTE).  CVI can t be cured, but you can control leg swelling to reduce the likelihood of ulcers (sores).  Recognizing the symptoms  Be aware of the following:    If you stand or sit with your feet down for long periods, your legs may ache or feel heavy.    Swollen ankles are possibly the most common symptom of CVI.    As swelling increases, the skin over your ankles may show red spots or a brownish tinge. The skin may feel leathery or scaly, and may start to itch.    If swelling is not controlled, an ulcer (open wound) may form.  What you can do  Reduce your risk of developing ulcers by doing the following:    Increase blood flow back to your heart by elevating your legs, exercising daily, and wearing elastic stockings.    Boost blood flow in your legs by losing excess weight.    If you must stand or sit in one place for a period of time, keep your blood moving by wiggling your toes, shifting your body position, and rising up on the balls of your feet.    Date Last Reviewed: 5/1/2016     [phone] [employer]. [address] [place] [address], [place] [address]. All rights reserved. This information is not intended as a substitute for professional medical care. Always follow your healthcare professional's instructions.

## 2018-08-01 ENCOUNTER — OFFICE VISIT (OUTPATIENT)
Dept: FAMILY MEDICINE | Facility: CLINIC | Age: 73
End: 2018-08-01
Payer: MEDICARE

## 2018-08-01 ENCOUNTER — TELEPHONE (OUTPATIENT)
Dept: OTHER | Facility: CLINIC | Age: 73
End: 2018-08-01

## 2018-08-01 VITALS
HEART RATE: 72 BPM | BODY MASS INDEX: 27.99 KG/M2 | HEIGHT: 65 IN | OXYGEN SATURATION: 98 % | WEIGHT: 168 LBS | TEMPERATURE: 96.9 F | DIASTOLIC BLOOD PRESSURE: 74 MMHG | SYSTOLIC BLOOD PRESSURE: 134 MMHG | RESPIRATION RATE: 12 BRPM

## 2018-08-01 DIAGNOSIS — M85.80 OSTEOPENIA, UNSPECIFIED LOCATION: ICD-10-CM

## 2018-08-01 DIAGNOSIS — I87.2 VENOUS (PERIPHERAL) INSUFFICIENCY: Primary | ICD-10-CM

## 2018-08-01 DIAGNOSIS — E21.3 HYPERPARATHYROIDISM (H): ICD-10-CM

## 2018-08-01 DIAGNOSIS — E78.5 HYPERLIPIDEMIA WITH TARGET LDL LESS THAN 130: ICD-10-CM

## 2018-08-01 DIAGNOSIS — I10 HYPERTENSION GOAL BP (BLOOD PRESSURE) < 150/90: ICD-10-CM

## 2018-08-01 DIAGNOSIS — N39.41 URGE INCONTINENCE OF URINE: ICD-10-CM

## 2018-08-01 DIAGNOSIS — E55.9 VITAMIN D DEFICIENCY: ICD-10-CM

## 2018-08-01 LAB
ALBUMIN UR-MCNC: NEGATIVE MG/DL
APPEARANCE UR: CLEAR
BILIRUB UR QL STRIP: NEGATIVE
COLOR UR AUTO: YELLOW
GLUCOSE UR STRIP-MCNC: NEGATIVE MG/DL
HGB UR QL STRIP: NEGATIVE
KETONES UR STRIP-MCNC: NEGATIVE MG/DL
LEUKOCYTE ESTERASE UR QL STRIP: NEGATIVE
NITRATE UR QL: NEGATIVE
PH UR STRIP: 6 PH (ref 5–7)
SOURCE: NORMAL
SP GR UR STRIP: <=1.005 (ref 1–1.03)
UROBILINOGEN UR STRIP-ACNC: 0.2 EU/DL (ref 0.2–1)

## 2018-08-01 PROCEDURE — 81003 URINALYSIS AUTO W/O SCOPE: CPT | Performed by: FAMILY MEDICINE

## 2018-08-01 PROCEDURE — 99213 OFFICE O/P EST LOW 20 MIN: CPT | Performed by: FAMILY MEDICINE

## 2018-08-01 RX ORDER — SIMVASTATIN 40 MG
40 TABLET ORAL DAILY
Qty: 90 TABLET | Refills: 3 | Status: SHIPPED | OUTPATIENT
Start: 2018-08-01 | End: 2019-07-22

## 2018-08-01 RX ORDER — LOSARTAN POTASSIUM AND HYDROCHLOROTHIAZIDE 12.5; 5 MG/1; MG/1
1 TABLET ORAL DAILY
Qty: 90 TABLET | Refills: 3 | Status: SHIPPED | OUTPATIENT
Start: 2018-08-01 | End: 2019-07-22

## 2018-08-01 NOTE — TELEPHONE ENCOUNTER
Pt referred to [facility] by Melanie Patel MD for venous insufficiency     Pt needs to be scheduled for consult with vascular medicine.  Will route to scheduling to coordinate an appointment at next available.    VANNESSA BautistaN, RN

## 2018-08-01 NOTE — MR AVS SNAPSHOT
After Visit Summary   8/1/2018    Tracie Feliciano    MRN: [id number]           Patient Information     Date Of Birth          1945        Visit Information        Provider Department      8/1/2018 11:40 AM Melanie Patel MD [facility]        Today's Diagnoses     Venous (peripheral) insufficiency    -  1    Hypertension goal BP (blood pressure) < 150/90        Hyperlipidemia with target LDL less than 130        Hyperparathyroidism (H)        Vitamin D deficiency        Osteopenia, unspecified location        Urge incontinence of urine          Care Instructions    [facility]    If you have any questions regarding to your visit please contact your care team:       Team Red:   Clinic Hours Telephone Number   Dr. Melanie Victor, NP   7am-7pm  Monday - Thursday   7am-5pm  Fridays  [phone]  (Appointment scheduling available 24/7)    Questions about your recent visit?   Team Line  [phone]   Urgent Care - [place] and [facility] - 11am-9pm Monday-Friday Saturday-Sunday- 9am-5pm   [place] - 5pm-9pm Monday-Friday Saturday-Sunday- 9am-5pm  [phone] - [place]  [phone] - [place]       What options do I have for a visit other than an office visit? We offer electronic visits (e-visits) and telephone visits, when medically appropriate.  Please check with your medical insurance to see if these types of visits are covered, as you will be responsible for any charges that are not paid by your insurance.      You can use [employer] (secure electronic communication) to access to your chart, send your primary care provider a message, or make an appointment. Ask a team member how to get started.     For a price quote for your services, please call our Consumer Price Line at [phone] or our Imaging Cost estimation line at [phone] (for imaging tests).  Allison HUFFMAN MA      It is recommended that  you get a vaccination for shingles called Shingrix (given as 2 shots, 2 to 6 months apart), even if you have already had the Zostavax vaccine. Discuss getting the Shingix vaccine from your pharmacist, or schedule an ancillary shot visit here. Some insurances do not cover the cost of these vaccines.      Understanding Chronic Venous Insufficiency  Problems with the veins in the legs may lead to chronic venous insufficiency (CVI). CVI means that there is a long-term problem with the veins not being able to pump blood back to your heart. When this happens, blood stays in the legs and causes swelling and aching.   Two problems that may lead to chronic venous insufficiency are:    Damaged valves. Valves keep blood flowing from the legs through the blood vessels and back to the heart. When the valves are damaged, blood does not flow as well.     Deep vein thrombosis (DVT). Blood clots may form in the deep veins of the legs. This may cause pain, redness, and swelling in the legs. It may also block the flow of blood back to the heart. Seek medical care right away if you have these symptoms.    A blood clot in the leg can also break off and travel to the lungs. This is called pulmonary embolism (PE). In the lungs, the clot can cut off the flow of blood. This may cause chest pain, trouble breathing, sweating, a fast heartbeat, coughing (may cough up blood), and fainting. It is a medical emergency and may cause death. Call 911 if you have these symptoms.    Healthcare providers call the two conditions, DVT and PE, venous thromboembolism (VTE).  CVI can t be cured, but you can control leg swelling to reduce the likelihood of ulcers (sores).  Recognizing the symptoms  Be aware of the following:    If you stand or sit with your feet down for long periods, your legs may ache or feel heavy.    Swollen ankles are possibly the most common symptom of CVI.    As swelling increases, the skin over your ankles may show red spots or a  brownish tinge. The skin may feel leathery or scaly, and may start to itch.    If swelling is not controlled, an ulcer (open wound) may form.  What you can do  Reduce your risk of developing ulcers by doing the following:    Increase blood flow back to your heart by elevating your legs, exercising daily, and wearing elastic stockings.    Boost blood flow in your legs by losing excess weight.    If you must stand or sit in one place for a period of time, keep your blood moving by wiggling your toes, shifting your body position, and rising up on the balls of your feet.    Date Last Reviewed: 5/1/2016 2000-2017 [employer]. [address] [place] [address], [place] [address]. All rights reserved. This information is not intended as a substitute for professional medical care. Always follow your healthcare professional's instructions.                Follow-ups after your visit        Additional Services     ENDOCRINOLOGY ADULT REFERRAL       Your provider has referred you to: [facility] [place]:  Luana Michael Camilo [phone]  [email or web address]  Dr. Stapleton       Please be aware that coverage of these services is subject to the terms and limitations of your health insurance plan.  Call member services at your health plan with any benefit or coverage questions.      Please bring the following to your appointment:    >>   Any x-rays, CTs or MRIs which have been performed.  Contact the facility where they were done to arrange for  prior to your scheduled appointment.    >>   List of current medications   >>   This referral request   >>   Any documents/labs given to you for this referral            VASCULAR SURGERY REFERRAL       Your provider has referred you to: **Vascular  Services [phone] - Varicose Veins & insufficiency - Please Order Appropriate Testing   [email or web address]    Please be aware that coverage of these  services is subject to the terms and limitations of your health insurance plan.  Call member services at your health plan with any benefit or coverage questions.      Please bring the following with you to your appointment:    (1) Any X-Rays, CTs or MRIs which have been performed.  Contact the facility where they were done to arrange for  prior to your scheduled appointment.    (2) List of current medications   (3) This referral request   (4) Any documents/labs given to you for this referral                  Follow-up notes from your care team     Return in about 11 months (around 6/20/2019) for Wellness visit (fasting labs up to one week prior).      Your next 10 appointments already scheduled     Sep 28, 2018 10:00 AM CDT   [facility] Visit with Pat Pack MD   [facility]    [address] [place] [id number] [phone]              Who to contact     If you have questions or need follow up information about today's clinic visit or your schedule please contact [facility] directly at [phone].  Normal or non-critical lab and imaging results will be communicated to you by MyChart, letter or phone within 4 business days after the clinic has received the results. If you do not hear from us within 7 days, please contact the clinic through MyChart or phone. If you have a critical or abnormal lab result, we will notify you by phone as soon as possible.  Submit refill requests through [employer] or call your pharmacy and they will forward the refill request to us. Please allow 3 business days for your refill to be completed.          Additional Information About Your Visit        Care EveryWhere ID     This is your Care EveryWhere ID. This could be used by other organizations to access your [place] medical records  [id number]        Your Vitals Were     Pulse Temperature Respirations Height Pulse Oximetry BMI (Body Mass Index)    72  "96.9  F (36.1  C) (Oral) 12 5' 5\" (1.651 m) 98% 27.96 kg/m2       Blood Pressure from Last 3 Encounters:   08/01/18 134/74   06/20/18 132/84   01/19/18 127/82    Weight from Last 3 Encounters:   08/01/18 168 lb (76.2 kg)   06/20/18 167 lb 3.2 oz (75.8 kg)   01/19/18 169 lb 2 oz (76.7 kg)              We Performed the Following     ENDOCRINOLOGY ADULT REFERRAL     UA reflex to Microscopic and Culture     VASCULAR SURGERY REFERRAL          Where to get your medicines      These medications were sent to [facility] [facility] [address] [place] - [address] AT Count includes the [facility] & [place]  [address] [facility] [address]     Phone:  [phone]     losartan-hydrochlorothiazide 50-12.5 MG per tablet    simvastatin 40 MG tablet          Primary Care Provider Office Phone # Fax #    Melanie Patel [phone] [address] [facility]  [place] [address]        Equal Access to Services     [facility] AH: Hadii marlon hills hadasho Sodaleali, waaxda luqadaha, qaybta kaalmada adebatshevayada, manisha merlos . So [facility] [phone].    ATENCIÓN: Si habla español, tiene a barbosa disposición servicios gratuitos de asistencia lingüística. [facility] [phone].    We comply with applicable federal civil rights laws and [place] laws. We do not discriminate on the basis of race, color, national origin, age, disability, sex, sexual orientation, or gender identity.            Thank you!     Thank you for choosing [facility]  for your care. Our goal is always to provide you with excellent care. Hearing back from our patients is one way we can continue to improve our services. Please take a few minutes to complete the written survey that you may receive in the mail after your visit with us. Thank you!             Your Updated Medication List - Protect others around you: Learn how to safely use, store and throw away your medicines at www.[employer].org.        "   This list is accurate as of 8/1/18 12:26 PM.  Always use your most recent med list.                   Brand Name Dispense Instructions for use Diagnosis    fexofenadine 180 MG tablet    ALLEGRA     Take 1 tablet (180 mg) by mouth daily as needed for allergies        fluticasone 50 MCG/ACT spray    FLONASE     Spray 1-2 sprays into both nostrils daily PRN        losartan-hydrochlorothiazide 50-12.5 MG per tablet    HYZAAR    90 tablet    Take 1 tablet by mouth daily    Hypertension goal BP (blood pressure) < 150/90       simvastatin 40 MG tablet    ZOCOR    90 tablet    Take 1 tablet (40 mg) by mouth daily    Hyperlipidemia with target LDL less than 130       tamoxifen 20 MG tablet    NOLVADEX     Take 20 mg by mouth daily        timolol 0.5 % ophthalmic solution    TIMOPTIC     PLACE 1 DROP INTO THE LEFT EYE QAM        vitamin D 2000 units tablet      Take 2,000 Units by mouth daily

## 2018-08-01 NOTE — LETTER
[address] [place] [address]    August 1, 2018    Tracie Feliciano  [address] [facility] [address]          Dear Tracie,    Your results are normal    Enclosed is a copy of your results.     Results for orders placed or performed in visit on 08/01/18   UA reflex to Microscopic and Culture   Result Value Ref Range    Color Urine Yellow     Appearance Urine Clear     Glucose Urine Negative NEG^Negative mg/dL    Bilirubin Urine Negative NEG^Negative    Ketones Urine Negative NEG^Negative mg/dL    Specific Gravity Urine <=1.005 1.003 - 1.035    Blood Urine Negative NEG^Negative    pH Urine 6.0 5.0 - 7.0 pH    Protein Albumin Urine Negative NEG^Negative mg/dL    Urobilinogen Urine 0.2 0.2 - 1.0 EU/dL    Nitrite Urine Negative NEG^Negative    Leukocyte Esterase Urine Negative NEG^Negative    Source Midstream Urine        If you have any questions or concerns, please call myself or my nurse at [phone].      Sincerely,        Melanie Patel MD/ha

## 2018-08-20 ENCOUNTER — TRANSFERRED RECORDS (OUTPATIENT)
Dept: HEALTH INFORMATION MANAGEMENT | Facility: CLINIC | Age: 73
End: 2018-08-20

## 2018-09-28 ENCOUNTER — TELEPHONE (OUTPATIENT)
Dept: ENDOCRINOLOGY | Facility: CLINIC | Age: 73
End: 2018-09-28

## 2018-09-28 ENCOUNTER — OFFICE VISIT (OUTPATIENT)
Dept: ENDOCRINOLOGY | Facility: CLINIC | Age: 73
End: 2018-09-28
Payer: MEDICARE

## 2018-09-28 VITALS
DIASTOLIC BLOOD PRESSURE: 82 MMHG | SYSTOLIC BLOOD PRESSURE: 145 MMHG | HEIGHT: 64 IN | WEIGHT: 166.4 LBS | BODY MASS INDEX: 28.41 KG/M2 | OXYGEN SATURATION: 100 % | HEART RATE: 64 BPM

## 2018-09-28 DIAGNOSIS — I10 HYPERTENSION GOAL BP (BLOOD PRESSURE) < 150/90: ICD-10-CM

## 2018-09-28 DIAGNOSIS — R79.89 LOW VITAMIN D LEVEL: ICD-10-CM

## 2018-09-28 DIAGNOSIS — M85.80 OSTEOPENIA, UNSPECIFIED LOCATION: ICD-10-CM

## 2018-09-28 DIAGNOSIS — E21.0 PRIMARY HYPERPARATHYROIDISM (H): Primary | ICD-10-CM

## 2018-09-28 LAB
ANION GAP SERPL CALCULATED.3IONS-SCNC: 5 MMOL/L (ref 3–14)
BUN SERPL-MCNC: 11 MG/DL (ref 7–30)
CALCIUM SERPL-MCNC: 9.8 MG/DL (ref 8.5–10.1)
CHLORIDE SERPL-SCNC: 103 MMOL/L (ref 94–109)
CO2 SERPL-SCNC: 29 MMOL/L (ref 20–32)
CREAT SERPL-MCNC: 0.57 MG/DL (ref 0.52–1.04)
GFR SERPL CREATININE-BSD FRML MDRD: >90 ML/MIN/1.7M2
GLUCOSE SERPL-MCNC: 97 MG/DL (ref 70–99)
POTASSIUM SERPL-SCNC: 4.1 MMOL/L (ref 3.4–5.3)
PTH-INTACT SERPL-MCNC: 86 PG/ML (ref 18–80)
SODIUM SERPL-SCNC: 137 MMOL/L (ref 133–144)

## 2018-09-28 PROCEDURE — 80048 BASIC METABOLIC PNL TOTAL CA: CPT | Performed by: INTERNAL MEDICINE

## 2018-09-28 PROCEDURE — 82306 VITAMIN D 25 HYDROXY: CPT | Performed by: INTERNAL MEDICINE

## 2018-09-28 PROCEDURE — 36415 COLL VENOUS BLD VENIPUNCTURE: CPT | Performed by: INTERNAL MEDICINE

## 2018-09-28 PROCEDURE — 83970 ASSAY OF PARATHORMONE: CPT | Performed by: INTERNAL MEDICINE

## 2018-09-28 PROCEDURE — 99214 OFFICE O/P EST MOD 30 MIN: CPT | Performed by: INTERNAL MEDICINE

## 2018-09-28 RX ORDER — LOSARTAN POTASSIUM AND HYDROCHLOROTHIAZIDE 12.5; 5 MG/1; MG/1
TABLET ORAL
Qty: 90 TABLET | Refills: 0 | OUTPATIENT
Start: 2018-09-28

## 2018-09-28 NOTE — NURSING NOTE
"Tracie Feliciano's goals for this visit include: Hypercalcemia follow up  She requests these members of her care team be copied on today's visit information: Yes    PCP: Melanie Patel    Referring Provider:  Referred Self, MD  No address on file    /82 (BP Location: Left arm, Patient Position: Chair, Cuff Size: Adult Regular)  Pulse 64  Ht 1.636 m (5' 4.41\")  Wt 75.5 kg (166 lb 6.4 oz)  SpO2 100%  BMI 28.2 kg/m2    Do you need any medication refills at today's visit? Yes    "

## 2018-09-28 NOTE — PROGRESS NOTES
- Endocrinology follow up -    Reason for visit/consult: elevated Ca and PTH    Primary care provider: Melanie Patel    Assessment and Plan  73 year old female with mildly elevated Ca, PTH, osteopenia, asymptomatic, no history of kidney stones,   # Elevated PTH  Normocalcemic primary hyperparathyroidism vs vitamin D deficiency.  - Ca, PTH, Vitamin D level today    # Low Vitamin D  Patient will call us for verifying the products and dose of calcium supplement. Lower dose supplement recommended.     # Low bone density  Patient will call us for verifying the products and dose of calcium supplement. Lower dose supplement recommended.   - bone density early 2019      RTC with me in 1 year.     --- Addendum ---  PTH stable, calcium normal continue to monitor.    9/28/2018 11:14   Sodium 137   Potassium 4.1   Chloride 103   Carbon Dioxide 29   Urea Nitrogen 11   Creatinine 0.57   GFR Estimate >90   GFR Estimate If Black >90   Calcium 9.8   Anion Gap 5   Vitamin D Deficiency screening 27   Glucose 97   Parathyroid Hormone Intact 86 (H)     OK to conitnue current dose of Nature made Calicum. (1200mg Ca, 800 VitD). Contacted patient.       Patient left voicemail stating that she was leaving detailed voicemail with update on her calcium and vitamin d. Patient reports that she is taking Nature Made Calcium 600 mg, two tablets daily and vitamin d 2,000 international units daily    I spent 30 minutes with this patient face to face and explained the conditions and plans (more than 50% of time was counseling/coordination of care, discussed follow up plan, and explained physiology of PTH hormone, explained importance of checking supplement facts of dietary products ) . The patient understood and is satisfied with today's visit. Return to clinic with me in 1 year.         Pat Pack MD  Staff Physician  Endocrinology and Metabolism  License:  [id number]    Interval History: Last seen 1/2018. Mild hypercalcemia summer 2017. No fractures, no bone pain. Active life with golf and yoga. Taking Nature Calcium supplements. Underwent cataract surgery recently. Left breast biopsy was negative.     HPI: A 72 female here for the evaluation of her elevated Ca and PTH. Referral from Dr. Patel.  Mildly elevated to calcium 10.3 was noted in August 2017, with . Patient has been asymptomatic.  No joint pain, no polydipsia, no polyuria, no abdominal pain, no constipation.  Pertinent medical history include osteopenia for the past 8-10 years, no family history of osteoporosis, never had a fracture before.  Currently taking vitamin D supplement , not taking a calcium supplement.  No history of kidney stone, no history of kidney disease.  Most recent bone density December 2017, showed left femoral neck T score -1.9.  Appetite: good, Joint pain: no, 3-4 glass of water/day, no polydipsia, no polyuria, no dysphagia.    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     Past Medical/Surgical History:  Past Medical History:   Diagnosis Date     Allergic rhinitis     allergy consultation     Breast neoplasm, Tis (LCIS), left      Cataract      Colon adenomas 6/25/2007     EUGENIE (generalized anxiety disorder)      HTN (hypertension)      Hyperlipidemia LDL goal < 130      Hyperparathyroidism (H)      Impaired fasting glucose 5/21/2013     LFT's abnormal     elevated GGT, resolved     Osteopenia      Urge incontinence of urine 8/1/2018     Venous (peripheral) insufficiency 6/6/2016     Vitamin D deficiency      Past Surgical History:   Procedure Laterality Date     C/SECTION, LOW TRANSVERSE  4/2/80     COLONOSCOPY  6/21/2012    Procedure: COLONOSCOPY;  COLONOSCOPY, SCREEN, PREVIOUS POLYP;  Surgeon: Maverick Maradiaga MD;  Location:  OR     EYE SURGERY Bilateral 2017    cataract     HYSTERECTOMY, PAP  "NO LONGER INDICATED  9/30/09    BSO      LUMPECTOMY BREAST Left 08/18/2017       Allergies:  Allergies   Allergen Reactions     Aspirin      Reactive gastropathy     Contrast Dye      sneezing     Lisinopril Cough       Current Medications   Current Outpatient Prescriptions   Medication     Cholecalciferol (VITAMIN D) 2000 UNITS tablet     fexofenadine (ALLEGRA) 180 MG tablet     fluticasone (FLONASE) 50 MCG/ACT spray     losartan-hydrochlorothiazide (HYZAAR) 50-12.5 MG per tablet     simvastatin (ZOCOR) 40 MG tablet     tamoxifen (NOLVADEX) 20 MG tablet     timolol (TIMOPTIC) 0.5 % ophthalmic solution     No current facility-administered medications for this visit.        Family History:  Family History   Problem Relation Age of Onset     Cancer Mother      d. pelvic     C.A.D. Mother      ?     C.A.D. Father 79     MI, d.     Alzheimer Disease Father      GASTROINTESTINAL DISEASE Father      PUD     Cancer Maternal Aunt      GI?     Diabetes No family hx of        Social History:  Social History   Substance Use Topics     Smoking status: Former Smoker     Packs/day: 0.50     Years: 1.00     Types: Cigarettes     Quit date: 1/1/1967     Smokeless tobacco: Never Used     Alcohol use 1.5 oz/week     3 Standard drinks or equivalent per week      Comment: wine   Yoga twice a week,     ROS:  Full review of systems taken with the help of the intake sheet. Otherwise a complete 14 point review of systems was taken and is negative unless stated in the history above.      Physical Exam:   Blood pressure 127/82, pulse 67, temperature 97.8  F (36.6  C), height 1.651 m (5' 5\"), weight 76.7 kg (169 lb 2 oz), SpO2 98 %,   General: well appearing, no acute distress, pleasant and conversant,   Mental Status/neuro: alert and oriented  Face: symmetrical, normal facial color  Eyes: anicteric, PERRL, no proptosis or lid lag  Neck: suppler, no lymphadenopahty  Thyroid: normal size and texture, no nodule palpable, no bruits  Back: no " scoliasis, no kyphosis  eart: regular rhythm, S1S2, no murmur appreciated  Lung: clear to auscultation bilaterally  Abdomen: soft, NT/ND, no hepatomegaly  Legs: no swelling or edema    Labs : I reviewed data from epic and extract and summarize the pertinent data here.     Results for CHANDLER YODER (MRN [id number]) as of 9/28/2018 10:37   Ref. Range 7/17/2017 10:13 8/14/2017 14:52 9/11/2017 09:31 1/19/2018 12:19 6/20/2018 10:26   Calcium Latest Ref Range: 8.5 - 10.1 mg/dL 9.9 10.3 (H) 10.2 (H) 9.9 10.1   Parathyroid Hormone Intact Latest Ref Range: 18 - 80 pg/mL   120 (H) 89 (H) 115 (H)   1,25 Dihydroxyvitamin D Latest Ref Range: 19.9 - 79.3 pg/mL    68.6    Vitamin D Deficiency screening Latest Ref Range: 20 - 75 ug/L   30  18 (L)      4/21/2015 15:09 5/31/2016 10:23 7/17/2017 10:13 8/14/2017 14:52 9/11/2017 09:31   Calcium 10.5 (H) 9.7 9.9 10.3 (H) 10.2 (H)       Bone density 12/1/2017: I personally reviewed the original images and agree with the below reports.       BONE DENSITOMETRY  [address] [address]  12/1/2017       FINDINGS:               Lumbar Spine (L1-L2) T-score: -0.9, marked degenerative changes present at L3,4, so only L1,2 are evaluated.                Left Femoral Neck T-score:  -1.9               Right Femoral Neck T-score:  -1.4                             Lumbar (L1-L2) BMD: 1.057            Previous: 1.094                                              Total Hip Mean BMD: 0.865            Previous: 0.907      Comparison is made to another DXA performed on the same [employer]  machine on 05/01/2014.       IMPRESSION  Osteopenia., Degenerative changes of the lumbar spine which may falsely elevate results.

## 2018-09-28 NOTE — PATIENT INSTRUCTIONS
Recheck in one year with labs today.  Please call with your supplement name and dose [phone].

## 2018-09-28 NOTE — TELEPHONE ENCOUNTER
Patient left voicemail stating that she was leaving detailed voicemail with update on her calcium and vitamin d. Patient reports that she is taking Nature Made Calcium 600 mg, two tablets daily and vitamin d 2,000 international units daily.     Will update Dr. Pack.      Veena Huston, RN  Endocrine Care Coordinator  [facility]

## 2018-09-28 NOTE — LETTER
Patient:  Tracie Feliciano  :   1945  MRN:     [id number]        Ms.Sarah GREGORY Feliciano  [address] [facility] [address]        2018    Dear ,    We are writing to inform you of your test results.  Results all look good, PTH hormone decreaing (good news).     Take care    Pat Pack MD    Resulted Orders   Basic metabolic panel   Result Value Ref Range    Sodium 137 133 - 144 mmol/L    Potassium 4.1 3.4 - 5.3 mmol/L    Chloride 103 94 - 109 mmol/L    Carbon Dioxide 29 20 - 32 mmol/L    Anion Gap 5 3 - 14 mmol/L    Glucose 97 70 - 99 mg/dL      Comment:      Non Fasting    Urea Nitrogen 11 7 - 30 mg/dL    Creatinine 0.57 0.52 - 1.04 mg/dL    GFR Estimate >90 >60 mL/min/1.7m2      Comment:      Non  GFR Calc    GFR Estimate If Black >90 >60 mL/min/1.7m2      Comment:       GFR Calc    Calcium 9.8 8.5 - 10.1 mg/dL   Parathyroid Hormone Intact   Result Value Ref Range    Parathyroid Hormone Intact 86 (H) 18 - 80 pg/mL   Vitamin D Deficiency   Result Value Ref Range    Vitamin D Deficiency screening 27 20 - 75 ug/L      Comment:      Season, race, dietary intake, and treatment affect the concentration of   25-hydroxy-Vitamin D. Values may decrease during winter months and increase   during summer months. Values 20-29 ug/L may indicate Vitamin D insufficiency   and values <20 ug/L may indicate Vitamin D deficiency.  Vitamin D determination is routinely performed by an immunoassay specific for   25 hydroxyvitamin D3.  If an individual is on vitamin D2 (ergocalciferol)   supplementation, please specify 25 OH vitamin D2 and D3 level determination by   LCMSMS test [id number].         Pat Pack MD

## 2018-09-28 NOTE — MR AVS SNAPSHOT
After Visit Summary   9/28/2018    Tracie Feliciano    MRN: [id number]           Patient Information     Date Of Birth          1945        Visit Information        Provider Department      9/28/2018 10:00 AM Pat Pack MD [facility]        Today's Diagnoses     Primary hyperparathyroidism (H)    -  1    Osteopenia, unspecified location          Care Instructions    Recheck in one year with labs today.  Please call with your supplement name and dose [phone].          Follow-ups after your visit        Your next 10 appointments already scheduled     Sep 27, 2019 10:00 AM CDT   Return Visit with Pat Pack MD   [facility] ([facility])    [address] [place] [id number] [phone]              Future tests that were ordered for you today     Open Future Orders        Priority Expected Expires Ordered    Basic metabolic panel Routine  9/28/2019 9/28/2018    Parathyroid Hormone Intact Routine  9/28/2019 9/28/2018    Vitamin D Deficiency Routine  9/28/2019 9/28/2018    DX Hip/Pelvis/Spine Routine  9/28/2019 9/28/2018            Who to contact     If you have questions or need follow up information about today's clinic visit or your schedule please contact [facility] directly at [phone].  Normal or non-critical lab and imaging results will be communicated to you by MyChart, letter or phone within 4 business days after the clinic has received the results. If you do not hear from us within 7 days, please contact the clinic through Provenancehart or phone. If you have a critical or abnormal lab result, we will notify you by phone as soon as possible.  Submit refill requests through Formisimo or call your pharmacy and they will forward the refill request to us. Please allow 3 business days for your refill to be completed.          Additional Information About Your Visit        MyChart Information     Provenancehart is  "an electronic gateway that provides easy, online access to your medical records. With Qitiot, you can request a clinic appointment, read your test results, renew a prescription or communicate with your care team.     To sign up for GTx visit the website at [email or web address]   You will be asked to enter the access code listed below, as well as some personal information. Please follow the directions to create your username and password.     Your access code is: [email or web address]  Expires: 2018 11:04 AM     Your access code will  in 90 days. If you need help or a new code, please contact your [facility] or call [phone] for assistance.        Care EveryWhere ID     This is your Care EveryWhere ID. This could be used by other organizations to access your [facility] medical records  [id number]        Your Vitals Were     Pulse Height Pulse Oximetry BMI (Body Mass Index)          64 1.636 m (5' 4.41\") 100% 28.2 kg/m2         Blood Pressure from Last 3 Encounters:   18 145/82   18 134/74   18 132/84    Weight from Last 3 Encounters:   18 75.5 kg (166 lb 6.4 oz)   18 76.2 kg (168 lb)   18 75.8 kg (167 lb 3.2 oz)               Primary Care Provider Office Phone # Fax #    Melanie Patel [phone] [address] [facility] [address]        Equal Access to Services     DARLENE SHIPLEY AH: Hadii marlon ku hadasho Soomaali, waaxda luqadaha, qaybta kaalmada adeegyachantel, manisha phillip. So [facility] [phone].    ATENCIÓN: Si habla español, tiene a barbosa disposición servicios gratuitos de asistencia lingüística. Llame al [phone].    We comply with applicable federal civil rights laws and [place] laws. We do not discriminate on the basis of race, color, national origin, age, disability, sex, sexual orientation, or gender identity.            Thank you!     Thank you for choosing [facility] " [facility]  for your care. Our goal is always to provide you with excellent care. Hearing back from our patients is one way we can continue to improve our services. Please take a few minutes to complete the written survey that you may receive in the mail after your visit with us. Thank you!             Your Updated Medication List - Protect others around you: Learn how to safely use, store and throw away your medicines at www.disposemymeds.org.          This list is accurate as of 9/28/18 11:04 AM.  Always use your most recent med list.                   Brand Name Dispense Instructions for use Diagnosis    fexofenadine 180 MG tablet    ALLEGRA     Take 1 tablet (180 mg) by mouth daily as needed for allergies        fluticasone 50 MCG/ACT spray    FLONASE     Spray 1-2 sprays into both nostrils daily PRN        losartan-hydrochlorothiazide 50-12.5 MG per tablet    HYZAAR    90 tablet    Take 1 tablet by mouth daily    Hypertension goal BP (blood pressure) < 150/90       simvastatin 40 MG tablet    ZOCOR    90 tablet    Take 1 tablet (40 mg) by mouth daily    Hyperlipidemia with target LDL less than 130       tamoxifen 20 MG tablet    NOLVADEX     Take 20 mg by mouth daily        timolol 0.5 % ophthalmic solution    TIMOPTIC     PLACE 1 DROP INTO THE LEFT EYE QAM        vitamin D 2000 units tablet      Take 2,000 Units by mouth daily

## 2018-09-28 NOTE — LETTER
9/28/2018         RE: Tracie Feliciano  [address] [facility] [address]        Dear Colleague,    Thank you for referring your patient, Tracie Feliciano, to the [facility]. Please see a copy of my visit note below.                                                                               - Endocrinology follow up -    Reason for visit/consult: elevated Ca and PTH    Primary care provider: Melanie Patel    Assessment and Plan  73 year old female with mildly elevated Ca, PTH, osteopenia, asymptomatic, no history of kidney stones,   # Elevated PTH  Normocalcemic primary hyperparathyroidism vs vitamin D deficiency.  - Ca, PTH, Vitamin D level today    # Low Vitamin D  Patient will call us for verifying the products and dose of calcium supplement. Lower dose supplement recommended.     # Low bone density  Patient will call us for verifying the products and dose of calcium supplement. Lower dose supplement recommended.   - bone density early 2019      RTC with me in 1 year.     --- Addendum ---  PTH stable, calcium normal continue to monitor.    9/28/2018 11:14   Sodium 137   Potassium 4.1   Chloride 103   Carbon Dioxide 29   Urea Nitrogen 11   Creatinine 0.57   GFR Estimate >90   GFR Estimate If Black >90   Calcium 9.8   Anion Gap 5   Vitamin D Deficiency screening 27   Glucose 97   Parathyroid Hormone Intact 86 (H)     OK to conitnue current dose of Nature made Calicum. (1200mg Ca, 800 VitD). Contacted patient.       Patient left voicemail stating that she was leaving detailed voicemail with update on her calcium and vitamin d. Patient reports that she is taking Nature Made Calcium 600 mg, two tablets daily and vitamin d 2,000 international units daily    I spent 30 minutes with this patient face to face and explained the conditions and plans (more than 50% of time was counseling/coordination of care, discussed follow up plan, and explained physiology of PTH hormone,  explained importance of checking supplement facts of dietary products ) . The patient understood and is satisfied with today's visit. Return to clinic with me in 1 year.         Pat Pack MD  Staff Physician  Endocrinology and Metabolism  License: [id number]    Interval History: Last seen 1/2018. Mild hypercalcemia summer 2017. No fractures, no bone pain. Active life with golf and yoga. Taking Nature Calcium supplements. Underwent cataract surgery recently. Left breast biopsy was negative.     HPI: A 72 female here for the evaluation of her elevated Ca and PTH. Referral from Dr. Patel.  Mildly elevated to calcium 10.3 was noted in August 2017, with . Patient has been asymptomatic.  No joint pain, no polydipsia, no polyuria, no abdominal pain, no constipation.  Pertinent medical history include osteopenia for the past 8-10 years, no family history of osteoporosis, never had a fracture before.  Currently taking vitamin D supplement , not taking a calcium supplement.  No history of kidney stone, no history of kidney disease.  Most recent bone density December 2017, showed left femoral neck T score -1.9.  Appetite: good, Joint pain: no, 3-4 glass of water/day, no polydipsia, no polyuria, no dysphagia.    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     Past Medical/Surgical History:  Past Medical History:   Diagnosis Date     Allergic rhinitis     allergy consultation     Breast neoplasm, Tis (LCIS), left      Cataract      Colon adenomas 6/25/2007     EUGENIE (generalized anxiety disorder)      HTN (hypertension)      Hyperlipidemia LDL goal < 130      Hyperparathyroidism (H)      Impaired fasting glucose 5/21/2013     LFT's abnormal     elevated GGT, resolved     Osteopenia      Urge incontinence of urine 8/1/2018     Venous (peripheral) insufficiency 6/6/2016     Vitamin D deficiency      Past Surgical History:   Procedure Laterality  "Date     C/SECTION, LOW TRANSVERSE  4/2/80     COLONOSCOPY  6/21/2012    Procedure: COLONOSCOPY;  COLONOSCOPY, SCREEN, PREVIOUS POLYP;  Surgeon: Maverick Maradiaga MD;  Location: [facility] OR     EYE SURGERY Bilateral 2017    cataract     HYSTERECTOMY, PAP NO LONGER INDICATED  9/30/09    BSO      LUMPECTOMY BREAST Left 08/18/2017       Allergies:  Allergies   Allergen Reactions     Aspirin      Reactive gastropathy     Contrast Dye      sneezing     Lisinopril Cough       Current Medications   Current Outpatient Prescriptions   Medication     Cholecalciferol (VITAMIN D) 2000 UNITS tablet     fexofenadine (ALLEGRA) 180 MG tablet     fluticasone (FLONASE) 50 MCG/ACT spray     losartan-hydrochlorothiazide (HYZAAR) 50-12.5 MG per tablet     simvastatin (ZOCOR) 40 MG tablet     tamoxifen (NOLVADEX) 20 MG tablet     timolol (TIMOPTIC) 0.5 % ophthalmic solution     No current facility-administered medications for this visit.        Family History:  Family History   Problem Relation Age of Onset     Cancer Mother      d. pelvic     C.A.D. Mother      ?     C.A.D. Father 79     MI, d.     Alzheimer Disease Father      GASTROINTESTINAL DISEASE Father      PUD     Cancer Maternal Aunt      GI?     Diabetes No family hx of        Social History:  Social History   Substance Use Topics     Smoking status: Former Smoker     Packs/day: 0.50     Years: 1.00     Types: Cigarettes     Quit date: 1/1/1967     Smokeless tobacco: Never Used     Alcohol use 1.5 oz/week     3 Standard drinks or equivalent per week      Comment: wine   Yoga twice a week,     ROS:  Full review of systems taken with the help of the intake sheet. Otherwise a complete 14 point review of systems was taken and is negative unless stated in the history above.      Physical Exam:   Blood pressure 127/82, pulse 67, temperature 97.8  F (36.6  C), height 1.651 m (5' 5\"), weight 76.7 kg (169 lb 2 oz), SpO2 98 %,   General: well appearing, no acute distress, pleasant and " conversant,   Mental Status/neuro: alert and oriented  Face: symmetrical, normal facial color  Eyes: anicteric, PERRL, no proptosis or lid lag  Neck: suppler, no lymphadenopahty  Thyroid: normal size and texture, no nodule palpable, no bruits  Back: no scoliasis, no kyphosis  eart: regular rhythm, S1S2, no murmur appreciated  Lung: clear to auscultation bilaterally  Abdomen: soft, NT/ND, no hepatomegaly  Legs: no swelling or edema    Labs : I reviewed data from epic and extract and summarize the pertinent data here.     Results for CHANDLER YODER (MRN [id number]) as of 9/28/2018 10:37   Ref. Range 7/17/2017 10:13 8/14/2017 14:52 9/11/2017 09:31 1/19/2018 12:19 6/20/2018 10:26   Calcium Latest Ref Range: 8.5 - 10.1 mg/dL 9.9 10.3 (H) 10.2 (H) 9.9 10.1   Parathyroid Hormone Intact Latest Ref Range: 18 - 80 pg/mL   120 (H) 89 (H) 115 (H)   1,25 Dihydroxyvitamin D Latest Ref Range: 19.9 - 79.3 pg/mL    68.6    Vitamin D Deficiency screening Latest Ref Range: 20 - 75 ug/L   30  18 (L)      4/21/2015 15:09 5/31/2016 10:23 7/17/2017 10:13 8/14/2017 14:52 9/11/2017 09:31   Calcium 10.5 (H) 9.7 9.9 10.3 (H) 10.2 (H)       Bone density 12/1/2017: I personally reviewed the original images and agree with the below reports.       BONE DENSITOMETRY  [facility]  [address] [place] [id number]  12/1/2017       FINDINGS:               Lumbar Spine (L1-L2) T-score: -0.9, marked degenerative changes present at L3,4, so only L1,2 are evaluated.                Left Femoral Neck T-score:  -1.9               Right Femoral Neck T-score:  -1.4                             Lumbar (L1-L2) BMD: 1.057            Previous: 1.094                                              Total Hip Mean BMD: 0.865            Previous: 0.907      Comparison is made to another DXA performed on the same Lunar Prodigy  machine on 05/01/2014.       IMPRESSION  Osteopenia., Degenerative changes of the lumbar spine which may falsely elevate  results.              Again, thank you for allowing me to participate in the care of your patient.        Sincerely,        Pat Pack MD

## 2018-09-29 LAB — DEPRECATED CALCIDIOL+CALCIFEROL SERPL-MC: 27 UG/L (ref 20–75)

## 2018-10-01 NOTE — TELEPHONE ENCOUNTER
Patient advised. Patient verbalizes understanding and agrees to plan.      Veena Huston RN  Endocrine Care Coordinator  [facility]

## 2018-10-01 NOTE — TELEPHONE ENCOUNTER
Left message for pt to call back to clinic.     Per Dr Pack :    Could you communicate? Rreviewed message, and verified.   It is OK to conitnue current dose of Nature Made calcium tablet by next visit.     Pat Wolfe, CMA

## 2018-10-02 NOTE — PROGRESS NOTES

## 2018-10-02 NOTE — PATIENT INSTRUCTIONS
[facility] (formerly [facility])    If you have any questions regarding to your visit please contact your care team:       Team Red:   Clinic Hours Telephone Number   Dr. Melanie Victor, NP 7am-7pm  Monday - Thursday   7am-5pm  Fridays  [phone]  (Appointment scheduling available 24/7)   Urgent Care - [place] and [facility] - 11am-9pm Monday-Friday Saturday-Sunday- 9am-5pm   [place] - 5pm-9pm Monday-Friday Saturday-Sunday- 9am-5pm  [phone] - [place]  [phone] - [place]       What options do I have for a visit other than an office visit? We offer electronic visits (e-visits) and telephone visits, when medically appropriate.  Please check with your medical insurance to see if these types of visits are covered, as you will be responsible for any charges that are not paid by your insurance.      You can use Autotether (secure electronic communication) to access to your chart, send your primary care provider a message, or make an appointment. Ask a team member how to get started.     For a price quote for your services, please call our Consumer Price Line at [phone] or our Imaging Cost estimation line at [phone] (for imaging tests).    It is recommended that you get a vaccination for shingles called Shingrix (given as 2 shots, 2 to 6 months apart), even if you have already had the Zostavax vaccine. Discuss getting the Shingix vaccine from your pharmacist, or schedule an ancillary shot visit here. Some insurances do not cover the cost of these vaccines.      Understanding Leg Vein Problems  Leg veins carry blood from your feet and legs back to your heart. If a vein is damaged, it may cause problems in your legs.              A damaged vein  If heredity, an injury, or a blood clot weakens a vein, the wall near the valve begins to sag. A valve keeps blood moving through the vein towards the heart. The valve may no longer close fully,  allowing blood to move backward.   A  ropy  vein  Once a vein is damaged, blood pressing against the sagging wall may cause the vein to look like a twisted rope. Eventually, the valve can t close. Blood may begin to pool or clot in the vein.   Pooling blood  A valve that doesn t close allows blood to move backwards and sit (pool) in the vein.   Clotting blood  Blood that is not moving may form clots. Over time, the clot may grow big enough to close off the vein.   The problems that may happen from damage to the veins include:     Varicose veins. This a swollen, twisted vein located close to the skin.    Deep vein thrombosis (DVT). This is a blood clot in one of the deep veins, usually of the legs. The clot can separate from the vein and travel to the lungs (pulmonary embolism or PE). In the lungs, the clot can cut off the flow of blood. These two conditions together are called venous thromboembolism (VTE).    Chronic venous insufficiency. This is a long-term problem with the veins not working well.  Your healthcare provider can give you more information on these conditions and how to prevent and treat them.  When to call your healthcare provider  Call your healthcare provider right away if you have pain, swelling, or redness in one of your legs. These are the symptoms of a blood clot.  Call 911  Call 911 if you have:    Chest pain    Trouble breathing    Fast heartbeat    Sweating    Coughing (may cough up blood)    Fainting  These are the symptoms of a blood clot that has traveled to the lungs. This is a medical emergency and may cause death.   Date Last Reviewed: 5/1/2016 2000-2017 [employer]. [address] [place] [address], [place] [address]. All rights reserved. This information is not intended as a substitute for professional medical care. Always follow your healthcare professional's instructions.

## 2018-10-03 ENCOUNTER — TELEPHONE (OUTPATIENT)
Dept: OTHER | Facility: CLINIC | Age: 73
End: 2018-10-03

## 2018-10-03 ENCOUNTER — OFFICE VISIT (OUTPATIENT)
Dept: FAMILY MEDICINE | Facility: CLINIC | Age: 73
End: 2018-10-03
Payer: MEDICARE

## 2018-10-03 ENCOUNTER — RADIANT APPOINTMENT (OUTPATIENT)
Dept: GENERAL RADIOLOGY | Facility: CLINIC | Age: 73
End: 2018-10-03
Attending: FAMILY MEDICINE
Payer: MEDICARE

## 2018-10-03 VITALS
DIASTOLIC BLOOD PRESSURE: 70 MMHG | HEIGHT: 64 IN | SYSTOLIC BLOOD PRESSURE: 120 MMHG | TEMPERATURE: 96.9 F | WEIGHT: 168 LBS | OXYGEN SATURATION: 99 % | RESPIRATION RATE: 22 BRPM | HEART RATE: 69 BPM | BODY MASS INDEX: 28.68 KG/M2

## 2018-10-03 DIAGNOSIS — D64.9 NORMOCYTIC ANEMIA: ICD-10-CM

## 2018-10-03 DIAGNOSIS — E21.3 HYPERPARATHYROIDISM (H): ICD-10-CM

## 2018-10-03 DIAGNOSIS — R07.89 ATYPICAL CHEST PAIN: ICD-10-CM

## 2018-10-03 DIAGNOSIS — J30.9 ALLERGIC RHINITIS, UNSPECIFIED SEASONALITY, UNSPECIFIED TRIGGER: ICD-10-CM

## 2018-10-03 DIAGNOSIS — D05.02 LOBULAR CARCINOMA IN SITU (LCIS) OF LEFT BREAST: ICD-10-CM

## 2018-10-03 DIAGNOSIS — I87.2 VENOUS (PERIPHERAL) INSUFFICIENCY: ICD-10-CM

## 2018-10-03 DIAGNOSIS — R07.89 ATYPICAL CHEST PAIN: Primary | ICD-10-CM

## 2018-10-03 DIAGNOSIS — R60.0 LEG EDEMA, LEFT: ICD-10-CM

## 2018-10-03 DIAGNOSIS — Z23 NEED FOR PROPHYLACTIC VACCINATION AND INOCULATION AGAINST INFLUENZA: ICD-10-CM

## 2018-10-03 LAB
BASOPHILS # BLD AUTO: 0 10E9/L (ref 0–0.2)
BASOPHILS NFR BLD AUTO: 0.3 %
D DIMER PPP FEU-MCNC: 0.3 UG/ML FEU (ref 0–0.5)
DIFFERENTIAL METHOD BLD: ABNORMAL
EOSINOPHIL # BLD AUTO: 0.1 10E9/L (ref 0–0.7)
EOSINOPHIL NFR BLD AUTO: 1.2 %
ERYTHROCYTE [DISTWIDTH] IN BLOOD BY AUTOMATED COUNT: 13.4 % (ref 10–15)
HCT VFR BLD AUTO: 34.8 % (ref 35–47)
HGB BLD-MCNC: 11.2 G/DL (ref 11.7–15.7)
LYMPHOCYTES # BLD AUTO: 1.5 10E9/L (ref 0.8–5.3)
LYMPHOCYTES NFR BLD AUTO: 20.5 %
MCH RBC QN AUTO: 27.9 PG (ref 26.5–33)
MCHC RBC AUTO-ENTMCNC: 32.2 G/DL (ref 31.5–36.5)
MCV RBC AUTO: 87 FL (ref 78–100)
MONOCYTES # BLD AUTO: 0.5 10E9/L (ref 0–1.3)
MONOCYTES NFR BLD AUTO: 7.2 %
NEUTROPHILS # BLD AUTO: 5.2 10E9/L (ref 1.6–8.3)
NEUTROPHILS NFR BLD AUTO: 70.8 %
PLATELET # BLD AUTO: 202 10E9/L (ref 150–450)
RBC # BLD AUTO: 4.02 10E12/L (ref 3.8–5.2)
WBC # BLD AUTO: 7.4 10E9/L (ref 4–11)

## 2018-10-03 PROCEDURE — 71046 X-RAY EXAM CHEST 2 VIEWS: CPT

## 2018-10-03 PROCEDURE — 85379 FIBRIN DEGRADATION QUANT: CPT | Performed by: FAMILY MEDICINE

## 2018-10-03 PROCEDURE — 90662 IIV NO PRSV INCREASED AG IM: CPT | Performed by: FAMILY MEDICINE

## 2018-10-03 PROCEDURE — 93000 ELECTROCARDIOGRAM COMPLETE: CPT | Performed by: FAMILY MEDICINE

## 2018-10-03 PROCEDURE — 36415 COLL VENOUS BLD VENIPUNCTURE: CPT | Performed by: FAMILY MEDICINE

## 2018-10-03 PROCEDURE — 99214 OFFICE O/P EST MOD 30 MIN: CPT | Mod: 25 | Performed by: FAMILY MEDICINE

## 2018-10-03 PROCEDURE — 85025 COMPLETE CBC W/AUTO DIFF WBC: CPT | Performed by: FAMILY MEDICINE

## 2018-10-03 PROCEDURE — G0008 ADMIN INFLUENZA VIRUS VAC: HCPCS | Performed by: FAMILY MEDICINE

## 2018-10-03 RX ORDER — FERROUS SULFATE 325(65) MG
325 TABLET ORAL 2 TIMES DAILY
Qty: 60 TABLET | Refills: 2 | Status: SHIPPED | OUTPATIENT
Start: 2018-10-03 | End: 2018-11-06

## 2018-10-03 NOTE — LETTER
[address] [place] [address]    October 4, 2018    Tracie Feliciano  [address] [facility] [address]  Dear Tracie,    Your results are normal      Enclosed is a copy of your results.     Results for orders placed or performed in visit on 10/03/18   D dimer quantitative   Result Value Ref Range    D Dimer 0.3 0.0 - 0.50 ug/ml FEU   CBC with platelets differential   Result Value Ref Range    WBC 7.4 4.0 - 11.0 10e9/L    RBC Count 4.02 3.8 - 5.2 10e12/L    Hemoglobin 11.2 (L) 11.7 - 15.7 g/dL    Hematocrit 34.8 (L) 35.0 - 47.0 %    MCV 87 78 - 100 fl    MCH 27.9 26.5 - 33.0 pg    MCHC 32.2 31.5 - 36.5 g/dL    RDW 13.4 10.0 - 15.0 %    Platelet Count 202 150 - 450 10e9/L    % Neutrophils 70.8 %    % Lymphocytes 20.5 %    % Monocytes 7.2 %    % Eosinophils 1.2 %    % Basophils 0.3 %    Absolute Neutrophil 5.2 1.6 - 8.3 10e9/L    Absolute Lymphocytes 1.5 0.8 - 5.3 10e9/L    Absolute Monocytes 0.5 0.0 - 1.3 10e9/L    Absolute Eosinophils 0.1 0.0 - 0.7 10e9/L    Absolute Basophils 0.0 0.0 - 0.2 10e9/L    Diff Method Automated Method    If you have any questions or concerns, please call myself or my nurse at [phone].    Sincerely,      Melanie Patel MD/ha

## 2018-10-03 NOTE — PROGRESS NOTES
Please call patient:  Your blood count is a little low again, called anemia. Take iron sulfate twice daily for 3 months, as well as omeprazole daily for your stomach for one month, and follow-up for additional lab testing in 1 to 2 weeks. If this is not improving, let's consider upper endoscopy.   Melanie Patel MD

## 2018-10-03 NOTE — PROGRESS NOTES
SUBJECTIVE:  Tracie Feliciano is a 73 year old female with Hx of LCIS of left breast who presents with atypical chest pain, aching, localized, not associated with position or exertion. Symptom onset has been gradual, intermittent, unchanged for a time period of weeks. Severity is described as mild and localized. Course of her symptoms over time is unchanged. She denies orthopnea, PND, cough, fever, chills, shortness of breath, wheezing, but does have chronic leg edema, L > R, and chronic nasal symptoms due to allergies, previously treated with allergy injections. She wants to keep medications to a minimum.     Patient Active Problem List    Diagnosis Date Noted     Lobular carcinoma in situ (LCIS) of left breast 06/20/2018     Priority: High     Hyperparathyroidism (H)      Priority: High     Hypertension goal BP (blood pressure) < 150/90 11/19/2010     Priority: High     Normocytic anemia 10/03/2018     Priority: Medium     Urge incontinence of urine 08/01/2018     Priority: Medium     Vitamin D deficiency      Priority: Medium     Osteopenia      Priority: Medium     Hyperlipidemia with target LDL less than 130      Priority: Medium     Pravastatin 80 ineffective       Venous (peripheral) insufficiency 06/06/2016     Priority: Low     Cataract      Priority: Low     Advanced directives, counseling/discussion 07/05/2011     Priority: Low     Advance Directive Problem List Overview:   Name Relationship Phone    Primary Health Care Agent            Alternative Health Care Agent          Discussed advance care planning with patient; information given to patient to review. 7/5/2011          Allergic rhinitis      Priority: Low     Allergist, history of allergy injections, nasal sprays         Past Medical History:   Diagnosis Date     Allergic rhinitis     allergy consultation     Breast neoplasm, Tis (LCIS), left      Cataract      Colon adenomas 6/25/2007     EUGENIE (generalized anxiety disorder)      HTN (hypertension)       Hyperlipidemia LDL goal < 130      Hyperparathyroidism (H)      Impaired fasting glucose 5/21/2013     LFT's abnormal     elevated GGT, resolved     Osteopenia      Urge incontinence of urine 8/1/2018     Venous (peripheral) insufficiency 6/6/2016     Vitamin D deficiency        Past Surgical History:   Procedure Laterality Date     C/SECTION, LOW TRANSVERSE  4/2/80     COLONOSCOPY  6/21/2012    Procedure: COLONOSCOPY;  COLONOSCOPY, SCREEN, PREVIOUS POLYP;  Surgeon: Maverick Maradiaga MD;  Location: [facility] OR     EYE SURGERY Bilateral 2017    cataract     HYSTERECTOMY, PAP NO LONGER INDICATED  9/30/09    BSO      LUMPECTOMY BREAST Left 08/18/2017       Social History     Social History     Marital status:      Spouse name: Bro     Number of children: 2     Years of education: 14     Occupational History     [employer] customer service Retired     Social History Main Topics     Smoking status: Former Smoker     Packs/day: 0.50     Years: 1.00     Types: Cigarettes     Quit date: 1/1/1967     Smokeless tobacco: Never Used     Alcohol use 1.5 oz/week     3 Standard drinks or equivalent per week      Comment: wine     Drug use: No     Sexual activity: Yes     Partners: Male     Birth control/ protection: Surgical, Post-menopausal     Other Topics Concern     Not on file     Social History Narrative       Family History   Problem Relation Age of Onset     Cancer Mother      d. pelvic     C.A.D. Mother      ?     C.A.D. Father 79     MI, d.     Alzheimer Disease Father      GASTROINTESTINAL DISEASE Father      PUD     Cancer Maternal Aunt      GI?     Diabetes No family hx of        Current Outpatient Prescriptions   Medication     Cholecalciferol (VITAMIN D) 2000 UNITS tablet     ferrous sulfate (IRON) 325 (65 Fe) MG tablet     fluticasone (FLONASE) 50 MCG/ACT spray     losartan-hydrochlorothiazide (HYZAAR) 50-12.5 MG per tablet     omeprazole (PRILOSEC) 20 MG CR capsule     simvastatin (ZOCOR) 40 MG tablet  "    tamoxifen (NOLVADEX) 20 MG tablet     timolol (TIMOPTIC) 0.5 % ophthalmic solution     No current facility-administered medications for this visit.        Allergies:  Aspirin; Contrast dye; and Lisinopril    ROS:  GENERAL: No change in weight, sleep or appetite.  Normal energy.  No fever or chills  EYES: Negative for vision changes or eye problems  ENT: nasal congestion  RESP: No coughing, wheezing or shortness of breath  CV: No palpitations   GI: No nausea, vomiting,  heartburn, abdominal pain, diarrhea, constipation or change in bowel habits  : No urinary frequency or dysuria, bladder or kidney problems  MUSCULOSKELETAL: No significant muscle or joint pains  NEUROLOGIC: No headaches, numbness, tingling, weakness, problems with balance or coordination  Psychiatric: anxiety  Heme/immune/allergy: Hx of anemia   Endocrine: hyperparathyroidism and osteopenia   Skin: spider veins      EXAM:  /70  Pulse 69  Temp 96.9  F (36.1  C) (Oral)  Resp 22  Ht 5' 4\" (1.626 m)  Wt 168 lb (76.2 kg)  SpO2 99%  Breastfeeding? No  BMI 28.84 kg/m2  GENERAL APPEARANCE: alert, no distress and cooperative  EYES: EOMI,  PERRL  HENT: ear canals and TM's normal and nose and mouth without ulcers or lesions  NECK: no adenopathy, no asymmetry, masses, or scars and thyroid normal to palpation  RESP: lungs clear to auscultation - no rales, rhonchi or wheezes  BREAST: normal without masses, tenderness or nipple discharge and no palpable axillary masses or adenopathy  CV: regular rates and rhythm, normal S1 S2, no S3 or S4 and no murmur, click or rub -  MS: woody bipedal edema, L > R, normal gait   SKIN: scattered spider veins and small lower extremity varicosities   PSYCH: mentation appears normal, affect flat and anxious    Results for orders placed or performed in visit on 10/03/18   D dimer quantitative   Result Value Ref Range    D Dimer 0.3 0.0 - 0.50 ug/ml FEU   CBC with platelets differential   Result Value Ref Range    " WBC 7.4 4.0 - 11.0 10e9/L    RBC Count 4.02 3.8 - 5.2 10e12/L    Hemoglobin 11.2 (L) 11.7 - 15.7 g/dL    Hematocrit 34.8 (L) 35.0 - 47.0 %    MCV 87 78 - 100 fl    MCH 27.9 26.5 - 33.0 pg    MCHC 32.2 31.5 - 36.5 g/dL    RDW 13.4 10.0 - 15.0 %    Platelet Count 202 150 - 450 10e9/L    % Neutrophils 70.8 %    % Lymphocytes 20.5 %    % Monocytes 7.2 %    % Eosinophils 1.2 %    % Basophils 0.3 %    Absolute Neutrophil 5.2 1.6 - 8.3 10e9/L    Absolute Lymphocytes 1.5 0.8 - 5.3 10e9/L    Absolute Monocytes 0.5 0.0 - 1.3 10e9/L    Absolute Eosinophils 0.1 0.0 - 0.7 10e9/L    Absolute Basophils 0.0 0.0 - 0.2 10e9/L    Diff Method Automated Method        ASSESSMENT/PLAN:  (R07.89) Atypical chest pain  (primary encounter diagnosis)  Comment: Differential diagnoses would include: gastritis, PUD, GERD, chest wall pain, anxiety, no evidence of fluid overload and symptoms are inconsistent with angina; PE ruled out by negative D dimer    Plan: D dimer quantitative, XR Chest 2 Views, CBC         with platelets differential, EKG 12-lead         complete w/read - Clinics, omeprazole         (PRILOSEC) 20 MG CR capsule        Discussed risks and benefits of this medication. Follow-up in 1 to 2 weeks for repeat labs and consider upper endoscopy.     (R60.0) Leg edema, left  (I87.2) Venous (peripheral) insufficiency  Comment: multiple visits for these complaints despite use of lifestyle strategies and compression   Plan: VASCULAR SURGERY REFERRAL          (D05.02) Lobular carcinoma in situ (LCIS) of left breast  Comment: no evidence of recurrence   Plan: continue annual mammography     (E21.3) Hyperparathyroidism (H)  Comment: mild, consult with endocrinology reviewed with patient   Plan: follow-up yearly     (J30.9) Allergic rhinitis, unspecified seasonality, unspecified trigger  Plan: offered addition of Zyrtec, Singulair or allergist follow-up         Melanie Patel MD  Department of [facility]  [address] [place] [address]  [phone] (voicemail)  [phone] (pager)

## 2018-10-03 NOTE — MR AVS SNAPSHOT
After Visit Summary   10/3/2018    Tracie Feliciano    MRN: [id number]           Patient Information     Date Of Birth          1945        Visit Information        Provider Department      10/3/2018 11:40 AM Melanie Patel MD [facility]        Today's Diagnoses     Atypical chest pain    -  1    Leg edema, left        Venous (peripheral) insufficiency        Lobular carcinoma in situ (LCIS) of left breast        Hyperparathyroidism (H)        Allergic rhinitis, unspecified seasonality, unspecified trigger          Care Instructions    [facility]    If you have any questions regarding to your visit please contact your care team:       Team Red:   Clinic Hours Telephone Number   Dr. Melanie Victor, NP 7am-7pm  Monday - Thursday   7am-5pm  Fridays  [phone]  (Appointment scheduling available 24/7)   Urgent Care - [place] - 11am-9pm Monday-Friday Saturday-Sunday- 9am-5pm   [place] - 5pm-9pm Monday-Friday Saturday-Sunday- 9am-5pm  [phone] - [place]  [phone] - [place]       What options do I have for a visit other than an office visit? We offer electronic visits (e-visits) and telephone visits, when medically appropriate.  Please check with your medical insurance to see if these types of visits are covered, as you will be responsible for any charges that are not paid by your insurance.      You can use [employer] (secure electronic communication) to access to your chart, send your primary care provider a message, or make an appointment. Ask a team member how to get started.     For a price quote for your services, please call our Consumer Price Line at [phone] or our Imaging Cost estimation line at [phone] (for imaging tests).    It is recommended that you get a vaccination for shingles called Shingrix (given as 2 shots, 2 to 6 months apart), even if you have  already had the Zostavax vaccine. Discuss getting the Shingix vaccine from your pharmacist, or schedule an ancillary shot visit here. Some insurances do not cover the cost of these vaccines.      Understanding Leg Vein Problems  Leg veins carry blood from your feet and legs back to your heart. If a vein is damaged, it may cause problems in your legs.              A damaged vein  If heredity, an injury, or a blood clot weakens a vein, the wall near the valve begins to sag. A valve keeps blood moving through the vein towards the heart. The valve may no longer close fully, allowing blood to move backward.   A  ropy  vein  Once a vein is damaged, blood pressing against the sagging wall may cause the vein to look like a twisted rope. Eventually, the valve can t close. Blood may begin to pool or clot in the vein.   Pooling blood  A valve that doesn t close allows blood to move backwards and sit (pool) in the vein.   Clotting blood  Blood that is not moving may form clots. Over time, the clot may grow big enough to close off the vein.   The problems that may happen from damage to the veins include:     Varicose veins. This a swollen, twisted vein located close to the skin.    Deep vein thrombosis (DVT). This is a blood clot in one of the deep veins, usually of the legs. The clot can separate from the vein and travel to the lungs (pulmonary embolism or PE). In the lungs, the clot can cut off the flow of blood. These two conditions together are called venous thromboembolism (VTE).    Chronic venous insufficiency. This is a long-term problem with the veins not working well.  Your healthcare provider can give you more information on these conditions and how to prevent and treat them.  When to call your healthcare provider  Call your healthcare provider right away if you have pain, swelling, or redness in one of your legs. These are the symptoms of a blood clot.  Call 911  Call 911 if you have:    Chest pain    Trouble  breathing    Fast heartbeat    Sweating    Coughing (may cough up blood)    Fainting  These are the symptoms of a blood clot that has traveled to the lungs. This is a medical emergency and may cause death.   Date Last Reviewed: 5/1/2016 2000-2017 [employer]. [address] [place] [address]. All rights reserved. This information is not intended as a substitute for professional medical care. Always follow your healthcare professional's instructions.                Follow-ups after your visit        Follow-up notes from your care team     Return in about 9 months (around 6/20/2019) for Wellness visit (fasting labs up to one week prior).      Your next 10 appointments already scheduled     Sep 27, 2019 10:00 AM CDT   Return Visit with Pat Pack MD   [facility]    [address] [place] [id number] [phone]              Future tests that were ordered for you today     Open Future Orders        Priority Expected Expires Ordered    XR Chest 2 Views Routine 10/3/2018 10/3/2019 10/3/2018            Who to contact     If you have questions or need follow up information about today's clinic visit or your schedule please contact [facility] directly at [phone].  Normal or non-critical lab and imaging results will be communicated to you by [employer], letter or phone within 4 business days after the clinic has received the results. If you do not hear from us within 7 days, please contact the clinic through [employer] or phone. If you have a critical or abnormal lab result, we will notify you by phone as soon as possible.  Submit refill requests through [employer] or call your pharmacy and they will forward the refill request to us. Please allow 3 business days for your refill to be completed.          Additional Information About Your Visit        [employer] Information     [employer] lets you send messages to your doctor, view  "your test results, renew your prescriptions, schedule appointments and more. To sign up, go to [email or web address] . Click on \"Log in\" on the left side of the screen, which will take you to the Welcome page. Then click on \"Sign up Now\" on the right side of the page.     You will be asked to enter the access code listed below, as well as some personal information. Please follow the directions to create your username and password.     Your access code is: [email or web address]  Expires: 2018 11:04 AM     Your access code will  in 90 days. If you need help or a new code, please call your [place] clinic or [phone].        Care EveryWhere ID     This is your Care EveryWhere ID. This could be used by other organizations to access your [place] medical records  [id number]        Your Vitals Were     Pulse Temperature Respirations Height Pulse Oximetry Breastfeeding?    69 96.9  F (36.1  C) (Oral) 22 5' 4\" (1.626 m) 99% No    BMI (Body Mass Index)                   28.84 kg/m2            Blood Pressure from Last 3 Encounters:   10/03/18 120/70   18 145/82   18 134/74    Weight from Last 3 Encounters:   10/03/18 168 lb (76.2 kg)   18 166 lb 6.4 oz (75.5 kg)   18 168 lb (76.2 kg)              We Performed the Following     CBC with platelets differential     D dimer quantitative     EKG 12-lead complete w/read - Clinics        Primary Care Provider Office Phone # Fax #    Melanie Patel [phone] [address] [facility] [address]        Equal Access to Services     [facility] : Hadnicolas Monroe, ebenezer angela, braden kaalmachantel omer, manisha phillip. So [facility] [phone].    ATENCIÓN: Si habla español, tiene a barbosa disposición servicios gratuitos de asistencia lingüística. Llame al [phone].    We comply with applicable federal civil rights laws and [place] laws. We do not discriminate on the basis of " race, color, national origin, age, disability, sex, sexual orientation, or gender identity.            Thank you!     Thank you for choosing [facility]  for your care. Our goal is always to provide you with excellent care. Hearing back from our patients is one way we can continue to improve our services. Please take a few minutes to complete the written survey that you may receive in the mail after your visit with us. Thank you!             Your Updated Medication List - Protect others around you: Learn how to safely use, store and throw away your medicines at www.disposemymeds.org.          This list is accurate as of 10/3/18 12:34 PM.  Always use your most recent med list.                   Brand Name Dispense Instructions for use Diagnosis    fluticasone 50 MCG/ACT spray    FLONASE     Spray 1-2 sprays into both nostrils daily PRN        losartan-hydrochlorothiazide 50-12.5 MG per tablet    HYZAAR    90 tablet    Take 1 tablet by mouth daily    Hypertension goal BP (blood pressure) < 150/90       simvastatin 40 MG tablet    ZOCOR    90 tablet    Take 1 tablet (40 mg) by mouth daily    Hyperlipidemia with target LDL less than 130       tamoxifen 20 MG tablet    NOLVADEX     Take 20 mg by mouth daily        timolol 0.5 % ophthalmic solution    TIMOPTIC     PLACE 1 DROP INTO THE LEFT EYE QAM        vitamin D 2000 units tablet      Take 2,000 Units by mouth daily

## 2018-10-03 NOTE — TELEPHONE ENCOUNTER
Pt referred to [facility] by Melanie Patel MD for venous insufficiency.      Pt needs to be scheduled for consult with vascular medicine.  Will route to scheduling to coordinate an appointment at next available.    EMI Lou, RN

## 2018-10-04 NOTE — TELEPHONE ENCOUNTER
Patient called back and stated she did not want to schedule at this time. She will call back when she is ready.

## 2018-10-04 NOTE — TELEPHONE ENCOUNTER
Left voicemail 10/4/18 for pt to call us for appt [facility] -  at [facility]

## 2018-10-17 ENCOUNTER — TELEPHONE (OUTPATIENT)
Dept: FAMILY MEDICINE | Facility: CLINIC | Age: 73
End: 2018-10-17

## 2018-10-17 DIAGNOSIS — D64.9 NORMOCYTIC ANEMIA: ICD-10-CM

## 2018-10-17 LAB
BASOPHILS # BLD AUTO: 0 10E9/L (ref 0–0.2)
BASOPHILS NFR BLD AUTO: 0.5 %
DIFFERENTIAL METHOD BLD: ABNORMAL
EOSINOPHIL # BLD AUTO: 0.1 10E9/L (ref 0–0.7)
EOSINOPHIL NFR BLD AUTO: 2 %
ERYTHROCYTE [DISTWIDTH] IN BLOOD BY AUTOMATED COUNT: 14.4 % (ref 10–15)
FERRITIN SERPL-MCNC: 22 NG/ML (ref 8–252)
HCT VFR BLD AUTO: 35.9 % (ref 35–47)
HGB BLD-MCNC: 11.6 G/DL (ref 11.7–15.7)
IRON SATN MFR SERPL: 22 % (ref 15–46)
IRON SERPL-MCNC: 90 UG/DL (ref 35–180)
LYMPHOCYTES # BLD AUTO: 1.3 10E9/L (ref 0.8–5.3)
LYMPHOCYTES NFR BLD AUTO: 22 %
MCH RBC QN AUTO: 28.4 PG (ref 26.5–33)
MCHC RBC AUTO-ENTMCNC: 32.3 G/DL (ref 31.5–36.5)
MCV RBC AUTO: 88 FL (ref 78–100)
MONOCYTES # BLD AUTO: 0.4 10E9/L (ref 0–1.3)
MONOCYTES NFR BLD AUTO: 6.7 %
NEUTROPHILS # BLD AUTO: 4.2 10E9/L (ref 1.6–8.3)
NEUTROPHILS NFR BLD AUTO: 68.8 %
PLATELET # BLD AUTO: 194 10E9/L (ref 150–450)
RBC # BLD AUTO: 4.08 10E12/L (ref 3.8–5.2)
TIBC SERPL-MCNC: 402 UG/DL (ref 240–430)
WBC # BLD AUTO: 6.1 10E9/L (ref 4–11)

## 2018-10-17 PROCEDURE — 83540 ASSAY OF IRON: CPT | Performed by: FAMILY MEDICINE

## 2018-10-17 PROCEDURE — 36415 COLL VENOUS BLD VENIPUNCTURE: CPT | Performed by: FAMILY MEDICINE

## 2018-10-17 PROCEDURE — 83550 IRON BINDING TEST: CPT | Performed by: FAMILY MEDICINE

## 2018-10-17 PROCEDURE — 85025 COMPLETE CBC W/AUTO DIFF WBC: CPT | Performed by: FAMILY MEDICINE

## 2018-10-17 PROCEDURE — 82728 ASSAY OF FERRITIN: CPT | Performed by: FAMILY MEDICINE

## 2018-10-17 NOTE — TELEPHONE ENCOUNTER
Called and spoke to patient's  but pt not home.   Advised to call clinic back when home.     Lizbeth Haynes RN

## 2018-10-17 NOTE — TELEPHONE ENCOUNTER
Reason for Call:  Other call back    Detailed comments: Patient had lab done 10/4/2018 and she doesn't understand the medical terminology from lab result. She would like a call back from someone that can explain.     Phone Number Patient can be reached at: Home number on file [phone] (home)    Best Time: daytime until 5pm    Can we leave a detailed message on this number? YES    Call taken on 10/17/2018 at 10:51 AM by Oksana Neal

## 2018-10-17 NOTE — TELEPHONE ENCOUNTER
Called and spoke with patient and answered questions regarding previous labs.  Pt stated that she does not want to take omeprazole and requested it to be removed from med list.   Verbalized understanding & no further questions.     Lizbeth Haynes RN

## 2018-11-05 ENCOUNTER — TELEPHONE (OUTPATIENT)
Dept: FAMILY MEDICINE | Facility: CLINIC | Age: 73
End: 2018-11-05

## 2018-11-05 DIAGNOSIS — D50.9 IRON DEFICIENCY ANEMIA, UNSPECIFIED IRON DEFICIENCY ANEMIA TYPE: Primary | ICD-10-CM

## 2018-11-05 NOTE — TELEPHONE ENCOUNTER
Reason for Call:  Other call back    Detailed comments: Patient is calling wanting to know if she has to continue to take Ferrous Sulfate because her iron level on her last labs was normal. Please call back    Phone Number Patient can be reached at: Home number on file [phone] (home)    Best Time: any    Can we leave a detailed message on this number? YES    Call taken on 11/5/2018 at 4:03 PM by Eleonora Garcia

## 2018-11-05 NOTE — TELEPHONE ENCOUNTER
Per result note: Notes Recorded by Sanjuana Galeano MD on 10/18/2018 at 4:17 PM  Your lab tests are looking good. Your hemoglobin is almost up to normal. Continue taking your iron tablets.    Patient has further questions regarding labs, how long should she stay on the iron tablets.  does she need a f/u visit or labs?  Patient asked the difference between Vit C and Calcium, explained that these are different, calcium with D will help bones.  Patient takes Vit D but is not sure if she should be taking Vit C or Calcium and would like provider to advise.   Meredith Hagan RN

## 2018-11-06 ENCOUNTER — TRANSFERRED RECORDS (OUTPATIENT)
Dept: HEALTH INFORMATION MANAGEMENT | Facility: CLINIC | Age: 73
End: 2018-11-06

## 2018-11-06 PROBLEM — D64.9 NORMOCYTIC ANEMIA: Status: RESOLVED | Noted: 2018-10-03 | Resolved: 2018-11-06

## 2018-11-06 RX ORDER — FERROUS SULFATE 325(65) MG
325 TABLET ORAL
Qty: 90 TABLET | Refills: 3 | Status: SHIPPED | OUTPATIENT
Start: 2018-11-06 | End: 2019-11-14

## 2018-11-06 NOTE — TELEPHONE ENCOUNTER
Pt returned call to RN hotline and left a message. She is taking ferrous sulfate tablets and wondering how long she needs to be on this? Also if she needs to have another blood test. Please call her back.     Isa Rodriguez RN  [facility]

## 2018-11-06 NOTE — TELEPHONE ENCOUNTER
Left message with patient's  to have pt return call to RN Hotline [phone].    Lizbeth Haynes RN

## 2018-11-06 NOTE — TELEPHONE ENCOUNTER
Please call patient: You do not need to take vitamin C. You can take vitamin D for your bone strength but do not need to take calcium (as it has not been shown to help in menopause). I recommend continuing the iron tablet daily indefinitely (and have sent a prescription), based on your history of recurrent anemia. If you are still having chest symptoms, let's consider upper endoscopy. Let's recheck your blood counts in 1 - 2 months at a lab only appointment. If your anemia returns, let's plan on colonoscopy to rule out any other source of bleeding.  Melanie Patel MD

## 2018-11-06 NOTE — TELEPHONE ENCOUNTER
Called and spoke to patient and gave information below per Dr. Patel.   Pt states that she will call back to schedule a lab appointment.   Verbalized understanding & no further questions.     Lizbeth Haynes RN

## 2018-12-30 ENCOUNTER — TELEPHONE (OUTPATIENT)
Dept: FAMILY MEDICINE | Facility: CLINIC | Age: 73
End: 2018-12-30

## 2018-12-30 DIAGNOSIS — D64.9 NORMOCYTIC ANEMIA: ICD-10-CM

## 2019-01-02 RX ORDER — FERROUS SULFATE 325(65) MG
TABLET ORAL
Qty: 60 TABLET | Refills: 0
Start: 2019-01-02

## 2019-01-02 NOTE — TELEPHONE ENCOUNTER
Spoke to pharmacy and confirmed duplicate request.  Isa RHODES CMA ([facility])

## 2019-01-02 NOTE — TELEPHONE ENCOUNTER
Please call pharmacy to verify refills   Should not be out    Medication Detail      Disp Refills Start End NADIYA   ferrous sulfate (IRON) 325 (65 Fe) MG tablet 90 tablet 3 11/6/2018  No   Sig - Route: Take 1 tablet (325 mg) by mouth daily (with breakfast) - Oral   Sent to pharmacy as: ferrous sulfate (IRON) 325 (65 Fe) MG tablet   Class: E-Prescribe   Order: [id number]   E-Prescribing Status: Receipt confirmed by pharmacy (11/6/2018  9:21 AM CST)         Jeffery Singh RN

## 2019-04-10 ENCOUNTER — OFFICE VISIT (OUTPATIENT)
Dept: ENDOCRINOLOGY | Facility: CLINIC | Age: 74
End: 2019-04-10
Payer: MEDICARE

## 2019-04-10 VITALS
HEIGHT: 64 IN | BODY MASS INDEX: 28.68 KG/M2 | SYSTOLIC BLOOD PRESSURE: 128 MMHG | HEART RATE: 65 BPM | WEIGHT: 168 LBS | DIASTOLIC BLOOD PRESSURE: 79 MMHG

## 2019-04-10 DIAGNOSIS — E21.3 HYPERPARATHYROIDISM (H): Primary | ICD-10-CM

## 2019-04-10 DIAGNOSIS — E55.9 VITAMIN D DEFICIENCY: ICD-10-CM

## 2019-04-10 LAB
ANION GAP SERPL CALCULATED.3IONS-SCNC: 8 MMOL/L (ref 3–14)
BUN SERPL-MCNC: 10 MG/DL (ref 7–30)
CALCIUM SERPL-MCNC: 10.3 MG/DL (ref 8.5–10.1)
CHLORIDE SERPL-SCNC: 103 MMOL/L (ref 94–109)
CO2 SERPL-SCNC: 26 MMOL/L (ref 20–32)
CREAT SERPL-MCNC: 0.54 MG/DL (ref 0.52–1.04)
DEPRECATED CALCIDIOL+CALCIFEROL SERPL-MC: 38 UG/L (ref 20–75)
GFR SERPL CREATININE-BSD FRML MDRD: >90 ML/MIN/{1.73_M2}
GLUCOSE SERPL-MCNC: 87 MG/DL (ref 70–99)
POTASSIUM SERPL-SCNC: 4.3 MMOL/L (ref 3.4–5.3)
PTH-INTACT SERPL-MCNC: 79 PG/ML (ref 18–80)
SODIUM SERPL-SCNC: 137 MMOL/L (ref 133–144)

## 2019-04-10 PROCEDURE — 36415 COLL VENOUS BLD VENIPUNCTURE: CPT | Performed by: INTERNAL MEDICINE

## 2019-04-10 PROCEDURE — 82306 VITAMIN D 25 HYDROXY: CPT | Performed by: INTERNAL MEDICINE

## 2019-04-10 PROCEDURE — 83970 ASSAY OF PARATHORMONE: CPT | Performed by: INTERNAL MEDICINE

## 2019-04-10 PROCEDURE — 99204 OFFICE O/P NEW MOD 45 MIN: CPT | Performed by: INTERNAL MEDICINE

## 2019-04-10 PROCEDURE — 80048 BASIC METABOLIC PNL TOTAL CA: CPT | Performed by: INTERNAL MEDICINE

## 2019-04-10 ASSESSMENT — MIFFLIN-ST. JEOR: SCORE: 1257.04

## 2019-04-10 NOTE — PROGRESS NOTES
Name: Tracie Feliciano is a 74 year old woman, seen at the request of Dr. Melanie Cooley for evaluation of     Chief Complaint   Patient presents with     Endocrine Problem     Hyperparathyroidism        HPI:  Recent issues:  Here for evaluation of hyperparathyroidism  Reviewed medical history from patient and Epic chart record        History of hyperparathyroidism  She has had asymptomatic elevated Ca, PTH, osteopenia  2018. Medical evaluation with Dr. Pat Pack/[facility]   Diagnosis of hyperparathyroidism   Recommendations for vitamin D supplementation and observation plan  Health history:               Kidney stones:     None                      Bone fractures:     Rib fracture ~2006                     Menopause:     Mid 40's              Previous DEXA:       Vitamin D deficiency: Yes   Supplements:   Vitamin D 2000 units 1-tab BID    12/1/17 DEXA:               Lumbar Spine (L1-L2) T-score: -0.9    marked degenerative changes present at L3,4, so only L1,2 are evaluated.                Left Femoral Neck T-score:  -1.9               Right Femoral Neck T-score:  -1.4      Recent  labs include:       Lab Results   Component Value Date     09/28/2018    POTASSIUM 4.1 09/28/2018    CHLORIDE 103 09/28/2018    CO2 29 09/28/2018    ANIONGAP 5 09/28/2018    GLC 97 09/28/2018    BUN 11 09/28/2018    CR 0.57 09/28/2018    GFRESTIMATED >90 09/28/2018    GFRESTBLACK >90 09/28/2018    KWAME 9.8 09/28/2018    TSH 1.83 05/31/2016    VITDT 27 09/28/2018    PTHI 86 (H) 09/28/2018     Recent symptoms:         , lives in [place] but moving to [facility] in [place]  Sees Dr. Melanie Cooley/[facility] for general medicine evaluations.    PMH/PSH:  Past Medical History:   Diagnosis Date     Allergic rhinitis     allergy consultation     Breast neoplasm, Tis (LCIS), left      Cataract      Colon adenomas 6/25/2007     EUGENIE (generalized anxiety disorder)      HTN (hypertension)       Hyperlipidemia LDL goal < 130      Hyperparathyroidism (H)      Impaired fasting glucose 2013     LFT's abnormal     elevated GGT, resolved     Osteopenia      Urge incontinence of urine 2018     Venous (peripheral) insufficiency 2016     Vitamin D deficiency      Past Surgical History:   Procedure Laterality Date     C/SECTION, LOW TRANSVERSE  80     COLONOSCOPY  2012    Procedure: COLONOSCOPY;  COLONOSCOPY, SCREEN, PREVIOUS POLYP;  Surgeon: Maverick Maradiaga MD;  Location: [facility] OR     EYE SURGERY Bilateral 2017    cataract     HYSTERECTOMY, PAP NO LONGER INDICATED  09    BSO      LUMPECTOMY BREAST Left 2017       Family Hx:  Family History   Problem Relation Age of Onset     Cancer Mother         d. pelvic     C.A.D. Mother         ?     C.A.D. Father 79        MI, d.     Alzheimer Disease Father      Gastrointestinal Disease Father         PUD     Cancer Maternal Aunt         GI?     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     Employer: RETIRED   Social Needs     Financial resource strain: Not on file     Food insecurity:     Worry: Not on file     Inability: Not on file     Transportation needs:     Medical: Not on file     Non-medical: Not on file   Tobacco Use     Smoking status: Former Smoker     Packs/day: 0.50     Years: 1.00     Pack years: 0.50     Types: Cigarettes     Last attempt to quit: 1967     Years since quittin.3     Smokeless tobacco: Never Used   Substance and Sexual Activity     Alcohol use: Yes     Alcohol/week: 1.5 oz     Types: 3 Standard drinks or equivalent per week     Comment: wine     Drug use: No     Sexual activity: Yes     Partners: Male     Birth control/protection: Surgical,  "Post-menopausal   Lifestyle     Physical activity:     Days per week: Not on file     Minutes per session: Not on file     Stress: Not on file   Relationships     Social connections:     Talks on phone: Not on file     Gets together: Not on file     Attends Rest[employer] service: Not on file     Active member of club or organization: Not on file     Attends meetings of clubs or organizations: Not on file     Relationship status: Not on file     Intimate partner violence:     Fear of current or ex partner: Not on file     Emotionally abused: Not on file     Physically abused: Not on file     Forced sexual activity: Not on file   Other Topics Concern     Parent/sibling w/ CABG, MI or angioplasty before 65F 55M? Not Asked   Social History Narrative     Not on file          MEDICATIONS:  has a current medication list which includes the following prescription(s): vitamin d3, ferrous sulfate, fluticasone, losartan-hydrochlorothiazide, simvastatin, tamoxifen, and timolol maleate.    ROS:     ROS: 10 point ROS neg other than the symptoms noted above in the HPI.    GENERAL: mild fatigue, wt stable; denies fevers, chills, night sweats.    HEENT: no dysphagia, odonophagia, diplopia, neck pain  THYROID:  no apparent hyper or hypothyroid symptoms  CV: no chest pain, pressure, palpitations  LUNGS: no SOB, ARGUETA, cough, wheezing   ABDOMEN: no diarrhea, constipation, abdominal pain  EXTREMITIES: no rashes, ulcers, edema  NEUROLOGY: no headaches, denies changes in vision, tingling, extremitiy numbness   MSK: rare low back pains; denies other arthralgias or muscle weakness  SKIN: no rashes or lesions  : no menses  PSYCH:  stable mood, no significant anxiety or depression  ENDOCRINE: no heat or cold intolerance    Physical Exam   VS: /79   Pulse 65   Ht 1.626 m (5' 4\")   Wt 76.2 kg (168 lb)   BMI 28.84 kg/m    GENERAL: AXOX3, NAD, well dressed, answering questions appropriately, appears stated age.  THYROID:  normal gland, no " apparent nodules or goiter  HEENT: neck non-tender, no exopthalmous, no proptosis, EOMI  CV: RRR, no rubs, gallops, no murmurs  LUNGS: CTAB, no wheezes, rales, or ronchi  ABDOMEN: mildly obese, soft, nontender, nondistended  EXTREMITIES: trace ankle edema, +pedal pulses, no lesions  NEUROLOGY: CN grossly intact, +no tremors  MSK: grossly intact  SKIN: no rashes, no lesions    LABS:    All pertinent notes, labs, and images personally reviewed by me.     A/P:  Encounter Diagnoses   Name Primary?     Hyperparathyroidism (H) Yes     Vitamin D deficiency        Comments:  Reviewed health history and the parathyroid issues.  History suggests very mild secondary HPT    Plan:  Discussed general issues with hyperparathyroidism (HPT) diagnosis and management  Reviewed parathyroid gland anatomy and hormone physiology  Discussed lab tests used to assess patient parathyroid gland function  We reviewed association of calcium, PTH, and vitamin D levels  Reviewed management options with parathyroid scan imaging    Recommend:  Advise checking labs today for comparison  Continue current vit D 2000 units 2-tabs daily  Monitor for symptom changes  No parathyroid gland imaging needed at this time  Keep well hydrated  Will summarize lab results with correspondence letter soon  Repeat DEXA scan in 12/2019, will defer to PCP    Addressed patient questions today    Labs ordered today:   Orders Placed This Encounter   Procedures     Parathyroid Hormone Intact     Basic metabolic panel     Vitamin D Deficiency     Radiology/Consults ordered today: None    More than 50% of the time spent with Ms. Feliciano on counseling / coordinating her care.  Total appointment time was 45 minutes.    Follow-up:  sunny Stapleton MD  Endocrinology  [facility]  CC: Melanie Patel

## 2019-04-17 NOTE — PROGRESS NOTES
"  SUBJECTIVE:                                                    Tracie Feliciano is a 72 year old female who presents to clinic today for the following health issues:    Hyperlipidemia Follow-Up    Rate your low fat/cholesterol diet?: not monitoring fat    Taking statin?  Yes, no muscle aches from statin    Other lipid medications/supplements?:  none    Hypertension Follow-up    Outpatient blood pressures are not being checked.    Low Salt Diet: low salt      Amount of exercise or physical activity: 2-3 days/week for an average of 30-45 minutes, and walks     Problems taking medications regularly: No    Medication side effects: none    Diet: regular (no restrictions), low salt and low fat/cholesterol    I have reviewed the patient's medical history in detail and updated the computerized patient record.     ROS:  C: NEGATIVE for fever, chills, change in weight  INTEGUMENTARY/SKIN: varicosities and spider veins   R: NEGATIVE for significant cough or SOB  CV: POSITIVE for dependent edema and NEGATIVE for chest pain/chest pressure, dyspnea on exertion, orthopnea, palpitations and paroxysmal nocturnal dyspnea  M: NEGATIVE for significant arthralgias or myalgia    OBJECTIVE:     /83 (BP Location: Left arm, Patient Position: Chair, Cuff Size: Adult Regular)  Pulse 75  Temp 97.4  F (36.3  C) (Oral)  Resp 18  Ht 5' 5\" (1.651 m)  Wt 176 lb 12.8 oz (80.2 kg)  SpO2 98%  Breastfeeding? No  BMI 29.42 kg/m2  Body mass index is 29.42 kg/(m^2).  GENERAL: alert, no distress and over weight  NECK: no adenopathy, no asymmetry, masses, or scars and thyroid normal to palpation  RESP: lungs clear to auscultation - no rales, rhonchi or wheezes  CV: regular rate and rhythm, normal S1 S2, no S3 or S4, no murmur   MS: trace bipedal edema, normal gait   SKIN: scattered spider veins and varicosities of lower extremities   PSYCH: mentation appears normal, affect normal/bright    Diagnostic Test Results:  Results for orders placed " Patient awake, a&o x4. C/o pain 3/10 in abd, describes it as sore and tender. Dressing to abd intact with old drainage. Patient refused miralax, states it made her sick yesterday. Patient ambulated around bullard with PCA. States she is passing gas, but no BM at this time. Bed alarm on and call light within reach. SO at bedside. Breakfast ordered. Will monitor. or performed in visit on 05/31/16   XR Ankle Left G/E 3 Views    Narrative    XR ANKLE LT G/E 3 VW 5/31/2016 10:54 AM    HISTORY: Effusion.    COMPARISON: None.      Impression    IMPRESSION: No evidence of acute fracture or malalignment. The ankle  mortise is intact.    KAYLAN STARR MD       ASSESSMENT/PLAN:   (I10) Hypertension goal BP (blood pressure) < 150/90  (primary encounter diagnosis)  Comment: Well controlled with medications without side effects.   Plan: BASIC METABOLIC PANEL,         losartan-hydrochlorothiazide (HYZAAR) 50-12.5         MG per tablet          (E78.5) Hyperlipidemia with target LDL less than 130  Comment: Well controlled with medications without side effects.   Plan: Lipid panel reflex to direct LDL, simvastatin         (ZOCOR) 40 MG tablet          (I87.2) Venous (peripheral) insufficiency  Plan: BASIC METABOLIC PANEL        Counseled to make better food choices, exercise as tolerated, and lose weight.     (M85.80) Osteopenia, unspecified location  Plan: Continue vitamin D supplement, regular exercise, falls precautions and DEXA bone scan every 3 - 5 years.     (Z12.31) Visit for screening mammogram  Plan: MA SCREENING DIGITAL BILAT - Future  (s+30)            See Patient Instructions    Melanie Patel MD  [facility]

## 2019-05-08 ENCOUNTER — TRANSFERRED RECORDS (OUTPATIENT)
Dept: HEALTH INFORMATION MANAGEMENT | Facility: CLINIC | Age: 74
End: 2019-05-08

## 2019-06-06 ENCOUNTER — TELEPHONE (OUTPATIENT)
Dept: FAMILY MEDICINE | Facility: CLINIC | Age: 74
End: 2019-06-06

## 2019-06-06 NOTE — TELEPHONE ENCOUNTER
Reason for Call:  Other appointment    Detailed comments:  Patient is having ankle swelling. She does not want to wait until July. Please call her.     Phone Number Patient can be reached at: Home number on file [phone] (home)    Best Time:  Any     Can we leave a detailed message on this number? YES    Call taken on 6/6/2019 at 12:19 PM by Katrina Kowalski

## 2019-06-06 NOTE — TELEPHONE ENCOUNTER
Patient was last seen on 10/3/19 and had ankle swelling, she then had testing done to check for blood clots but that came back normal.  Patient wondering if she is due for dexa scan- advised that Dexa scan was last done on 12/01/17 and next one is due Dec 2019.  Patient continues to have swelling of ankles but recently the swelling moved to both ankles- more swollen in left ankle.  Denies SOB/chest pain. She does not add salt to foods and does not eat processed foods.  She would like to be seen to find cause of this- she does not want to wear support stockings in the summer. Appointment scheduled.   Meredith Hagan RN

## 2019-06-10 ENCOUNTER — OFFICE VISIT (OUTPATIENT)
Dept: FAMILY MEDICINE | Facility: CLINIC | Age: 74
End: 2019-06-10
Payer: MEDICARE

## 2019-06-10 VITALS
DIASTOLIC BLOOD PRESSURE: 76 MMHG | WEIGHT: 163 LBS | BODY MASS INDEX: 27.83 KG/M2 | HEART RATE: 74 BPM | SYSTOLIC BLOOD PRESSURE: 118 MMHG | HEIGHT: 64 IN | RESPIRATION RATE: 18 BRPM | OXYGEN SATURATION: 100 % | TEMPERATURE: 97.7 F

## 2019-06-10 DIAGNOSIS — M19.079 DEGENERATIVE JOINT DISEASE OF TOE: ICD-10-CM

## 2019-06-10 DIAGNOSIS — D50.9 IRON DEFICIENCY ANEMIA, UNSPECIFIED IRON DEFICIENCY ANEMIA TYPE: ICD-10-CM

## 2019-06-10 DIAGNOSIS — I87.2 VENOUS (PERIPHERAL) INSUFFICIENCY: Primary | ICD-10-CM

## 2019-06-10 DIAGNOSIS — E78.5 HYPERLIPIDEMIA WITH TARGET LDL LESS THAN 130: ICD-10-CM

## 2019-06-10 DIAGNOSIS — I10 HYPERTENSION GOAL BP (BLOOD PRESSURE) < 140/90: ICD-10-CM

## 2019-06-10 PROCEDURE — 99213 OFFICE O/P EST LOW 20 MIN: CPT | Performed by: FAMILY MEDICINE

## 2019-06-10 ASSESSMENT — MIFFLIN-ST. JEOR: SCORE: 1234.36

## 2019-06-10 NOTE — PROGRESS NOTES
"Subjective     Tracie Feliciano is a 74 year old female who presents to clinic today for the following health issues:    HPI   Chief Complaint   Patient presents with     RECHECK     Left ankle swelling     Tracie Feliciano is a 74 year old female who presents with ongoing dependent edema. Symptom onset has been waxing and waning for a time period of years. Severity is described as mild. Course of her symptoms over time is waxing and waning.    She also has gradual painless enlargement of her right great toe joint.     Reviewed and updated as needed this visit by Provider  Tobacco  Allergies  Meds  Problems  Med Hx  Surg Hx  Fam Hx         Review of Systems   ROS COMP: CONSTITUTIONAL:POSITIVE  for weight loss  INTEGUMENTARY/SKIN: NEGATIVE for worrisome rashes, moles or lesions  RESP: NEGATIVE for significant cough or SOB  BREAST: NEGATIVE for masses, tenderness or discharge   female: incontinence-urge and incontinence-stress  MUSCULOSKELETAL: NEGATIVE for significant arthralgias or myalgia  NEURO: NEGATIVE for weakness, dizziness or paresthesias  HEME/ALLERGY/IMMUNE: anemia      Objective    /76   Pulse 74   Temp 97.7  F (36.5  C) (Oral)   Resp 18   Ht 1.626 m (5' 4\")   Wt 73.9 kg (163 lb)   SpO2 100%   Breastfeeding? No   BMI 27.98 kg/m    Body mass index is 27.98 kg/m .  Physical Exam   GENERAL: healthy, alert and no distress  NECK: no adenopathy, no asymmetry, masses, or scars and thyroid normal to palpation  RESP: lungs clear to auscultation - no rales, rhonchi or wheezes  CV: regular rate and rhythm, normal S1 S2, no S3 or S4, no murmur, click or rub   MS: trace woody bipedal edema, non-pitting   SKIN: no suspicious lesions or rashes and scattered spider veins and lower extremity varicosities   PSYCH: mentation appears normal, affect normal/bright    Diagnostic Test Results:  Labs reviewed in Epic  Results for orders placed or performed in visit on 04/10/19   Parathyroid Hormone Intact " "  Result Value Ref Range    Parathyroid Hormone Intact 79 18 - 80 pg/mL   Basic metabolic panel   Result Value Ref Range    Sodium 137 133 - 144 mmol/L    Potassium 4.3 3.4 - 5.3 mmol/L    Chloride 103 94 - 109 mmol/L    Carbon Dioxide 26 20 - 32 mmol/L    Anion Gap 8 3 - 14 mmol/L    Glucose 87 70 - 99 mg/dL    Urea Nitrogen 10 7 - 30 mg/dL    Creatinine 0.54 0.52 - 1.04 mg/dL    GFR Estimate >90 >60 mL/min/[1.73_m2]    GFR Estimate If Black >90 >60 mL/min/[1.73_m2]    Calcium 10.3 (H) 8.5 - 10.1 mg/dL   Vitamin D Deficiency   Result Value Ref Range    Vitamin D Deficiency screening 38 20 - 75 ug/L           Assessment & Plan     (I87.2) Venous (peripheral) insufficiency  (primary encounter diagnosis)  Plan: discussed etiology. Lower extremity elevation, low-salt diet, compression stockings, and vascular surgery consult if desired     (D50.9) Iron deficiency anemia, unspecified iron deficiency anemia type  Plan: follow-up for labs and evaluation if indicated     (I10) Hypertension goal BP (blood pressure) < 140/90  Comment: Well controlled with medications without side effects.   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 Offered x-ray and coping strategies.      BMI:   Estimated body mass index is 27.98 kg/m  as calculated from the following:    Height as of this encounter: 1.626 m (5' 4\").    Weight as of this encounter: 73.9 kg (163 lb).               Return in about 6 weeks (around 7/22/2019) for Wellness visit (fasting labs up to one week prior).    Melanie Patel MD  [facility]    "

## 2019-06-10 NOTE — PATIENT INSTRUCTIONS
It is recommended that you get a vaccination for shingles called Shingrix (given as 2 shots, 2 to 6 months apart), even if you have already had the Zostavax vaccine. Discuss getting the Shingix vaccine from your pharmacist, or schedule an ancillary shot visit here. Some insurances do not cover the cost of these vaccines.      Patient Education     Understanding Leg Vein Problems  Leg veins carry blood from your feet and legs back to your heart. If a vein is damaged, it may cause problems in your legs.              A damaged vein  If heredity, an injury, or a blood clot weakens a vein, the wall near the valve begins to sag. A valve keeps blood moving through the vein towards the heart. The valve may no longer close fully, allowing blood to move backward.   A  ropy  vein  Once a vein is damaged, blood pressing against the sagging wall may cause the vein to look like a twisted rope. Eventually, the valve can t close. Blood may begin to pool or clot in the vein.   Pooling blood  A valve that doesn t close allows blood to move backwards and sit (pool) in the vein.   Clotting blood  Blood that is not moving may form clots. Over time, the clot may grow big enough to close off the vein.   The problems that may happen from damage to the veins include:     Varicose veins. This a swollen, twisted vein located close to the skin.    Deep vein thrombosis (DVT). This is a blood clot in one of the deep veins, usually of the legs. The clot can separate from the vein and travel to the lungs (pulmonary embolism or PE). In the lungs, the clot can cut off the flow of blood. These two conditions together are called venous thromboembolism (VTE).    Chronic venous insufficiency. This is a long-term problem with the veins not working well.  Your healthcare provider can give you more information on these conditions and how to prevent and treat them.  When to call your healthcare provider  Call your healthcare provider right away if you have  pain, swelling, or redness in one of your legs. These are the symptoms of a blood clot.  Call 911  Call 911 if you have:    Chest pain    Trouble breathing    Fast heartbeat    Sweating    Coughing (may cough up blood)    Fainting  These are the symptoms of a blood clot that has traveled to the lungs. This is a medical emergency and may cause death.   Date Last Reviewed: 5/1/2016 2000-2018 [employer]. [address] [place] [address], [place] [id number]. All rights reserved. This information is not intended as a substitute for professional medical care. Always follow your healthcare professional's instructions.

## 2019-07-11 DIAGNOSIS — E78.5 HYPERLIPIDEMIA WITH TARGET LDL LESS THAN 130: ICD-10-CM

## 2019-07-11 DIAGNOSIS — D50.9 IRON DEFICIENCY ANEMIA, UNSPECIFIED IRON DEFICIENCY ANEMIA TYPE: ICD-10-CM

## 2019-07-11 DIAGNOSIS — I10 HYPERTENSION GOAL BP (BLOOD PRESSURE) < 140/90: ICD-10-CM

## 2019-07-11 LAB
ANION GAP SERPL CALCULATED.3IONS-SCNC: 6 MMOL/L (ref 3–14)
BUN SERPL-MCNC: 12 MG/DL (ref 7–30)
CALCIUM SERPL-MCNC: 9.7 MG/DL (ref 8.5–10.1)
CHLORIDE SERPL-SCNC: 103 MMOL/L (ref 94–109)
CHOLEST SERPL-MCNC: 158 MG/DL
CO2 SERPL-SCNC: 29 MMOL/L (ref 20–32)
CREAT SERPL-MCNC: 0.52 MG/DL (ref 0.52–1.04)
FERRITIN SERPL-MCNC: 71 NG/ML (ref 8–252)
GFR SERPL CREATININE-BSD FRML MDRD: >90 ML/MIN/{1.73_M2}
GLUCOSE SERPL-MCNC: 91 MG/DL (ref 70–99)
HDLC SERPL-MCNC: 56 MG/DL
HGB BLD-MCNC: 13.5 G/DL (ref 11.7–15.7)
LDLC SERPL CALC-MCNC: 75 MG/DL
NONHDLC SERPL-MCNC: 102 MG/DL
POTASSIUM SERPL-SCNC: 3.8 MMOL/L (ref 3.4–5.3)
SODIUM SERPL-SCNC: 138 MMOL/L (ref 133–144)
TRIGL SERPL-MCNC: 133 MG/DL

## 2019-07-11 PROCEDURE — 80048 BASIC METABOLIC PNL TOTAL CA: CPT | Performed by: FAMILY MEDICINE

## 2019-07-11 PROCEDURE — 82728 ASSAY OF FERRITIN: CPT | Performed by: FAMILY MEDICINE

## 2019-07-11 PROCEDURE — 85018 HEMOGLOBIN: CPT | Performed by: FAMILY MEDICINE

## 2019-07-11 PROCEDURE — 80061 LIPID PANEL: CPT | Performed by: FAMILY MEDICINE

## 2019-07-11 PROCEDURE — 36415 COLL VENOUS BLD VENIPUNCTURE: CPT | Performed by: FAMILY MEDICINE

## 2019-07-22 ENCOUNTER — OFFICE VISIT (OUTPATIENT)
Dept: FAMILY MEDICINE | Facility: CLINIC | Age: 74
End: 2019-07-22
Payer: MEDICARE

## 2019-07-22 VITALS
OXYGEN SATURATION: 97 % | DIASTOLIC BLOOD PRESSURE: 76 MMHG | RESPIRATION RATE: 18 BRPM | HEIGHT: 64 IN | TEMPERATURE: 99.8 F | HEART RATE: 75 BPM | WEIGHT: 164.6 LBS | BODY MASS INDEX: 28.1 KG/M2 | SYSTOLIC BLOOD PRESSURE: 118 MMHG

## 2019-07-22 DIAGNOSIS — Z12.31 VISIT FOR SCREENING MAMMOGRAM: ICD-10-CM

## 2019-07-22 DIAGNOSIS — I87.2 VENOUS (PERIPHERAL) INSUFFICIENCY: ICD-10-CM

## 2019-07-22 DIAGNOSIS — Z00.00 ENCOUNTER FOR MEDICARE ANNUAL WELLNESS EXAM: Primary | ICD-10-CM

## 2019-07-22 DIAGNOSIS — E21.3 HYPERPARATHYROIDISM (H): ICD-10-CM

## 2019-07-22 DIAGNOSIS — E78.5 HYPERLIPIDEMIA WITH TARGET LDL LESS THAN 130: ICD-10-CM

## 2019-07-22 DIAGNOSIS — R06.83 SNORING: ICD-10-CM

## 2019-07-22 PROBLEM — Z85.3 HISTORY OF LEFT BREAST CANCER: Status: ACTIVE | Noted: 2018-06-20

## 2019-07-22 PROCEDURE — G0439 PPPS, SUBSEQ VISIT: HCPCS | Performed by: FAMILY MEDICINE

## 2019-07-22 RX ORDER — SIMVASTATIN 40 MG
40 TABLET ORAL DAILY
Qty: 90 TABLET | Refills: 3 | Status: SHIPPED | OUTPATIENT
Start: 2019-07-22 | End: 2020-07-28

## 2019-07-22 RX ORDER — LOSARTAN POTASSIUM AND HYDROCHLOROTHIAZIDE 12.5; 5 MG/1; MG/1
1 TABLET ORAL DAILY
Qty: 90 TABLET | Refills: 3 | Status: SHIPPED | OUTPATIENT
Start: 2019-07-22 | End: 2019-12-16

## 2019-07-22 ASSESSMENT — ACTIVITIES OF DAILY LIVING (ADL): CURRENT_FUNCTION: NO ASSISTANCE NEEDED

## 2019-07-22 ASSESSMENT — MIFFLIN-ST. JEOR: SCORE: 1241.62

## 2019-07-22 NOTE — PATIENT INSTRUCTIONS
Patient Education   Personalized Prevention Plan  You are due for the preventive services outlined below.  Your care team is available to assist you in scheduling these services.  If you have already completed any of these items, please share that information with your care team to update in your medical record.  Health Maintenance Due   Topic Date Due     Zoster (Shingles) Vaccine (2 of 3) 08/19/2011     Diptheria Tetanus Pertussis (DTAP/TDAP/TD) Vaccine (3 - Td) 04/30/2019     Annual Wellness Visit  06/20/2019     Mammogram  08/20/2019       Urinary Incontinence, Female (Adult)  Urinary incontinence means loss of control of the bladder. This problem affects many women, especially as they get older. If you have incontinence, you may be embarrassed to ask for help. But know that this problem can be treated.  Types of Incontinence  There are different types of incontinence. Two of the main types are described here. You can have more than one type.    Stress incontinence. With this type, urine leaks when pressure (stress) is put on the bladder. This may happen when you cough, sneeze, or laugh. Stress incontinence most often occurs because the pelvic floor muscles that support the bladder and urethra are weak. This can happen after pregnancy and vaginal childbirth or a hysterectomy. It can also be due to excess body weight or hormone changes.    Urge incontinence (also called overactive bladder). With this type, a sudden urge to urinate is felt often. This may happen even though there may not be much urine in the bladder. The need to urinate often during the night is common. Urge incontinence most often occurs because of bladder spasms. This may be due to bladder irritation or infection. Damage to bladder nerves or pelvic muscles, constipation, and certain medicines can also lead to urge incontinence.  Treatment of urinary incontinence depends on the cause. Further evaluation is needed to find the type you have. This  will likely include an exam and certain tests. Based on the results, you and your healthcare provider can then plan treatment. Until a diagnosis is made, the home care tips below can help relieve symptoms.  Home care    Do pelvic floor muscle exercises, if they are prescribed. The pelvic floor muscles help support the bladder and urethra. Many women find that their symptoms improve when doing special exercises that strengthen these muscles. To do the exercises contract the muscles you would use to stop your stream of urine, but do this when you re not urinating. Hold for 10 seconds, then relax. Repeat 10 to 20 times in a row, at least 3 times a day. Your provider may give you other instructions for how to do the exercises and how often.    Keep a bladder diary. This helps track how often and how much you urinate over a set period of time. Bring this diary with you to your next visit with the provider. The information can help your provider learn more about your bladder problem.    Lose weight, if advised to by your provider. Excess weight puts pressure on the bladder. Your provider can help you create a weight-loss plan that s right for you. This may include exercising more and making certain diet changes.    Don't consume foods and drinks that may irritate the bladder. These can include alcohol and caffeinated drinks.    Quit smoking. Smoking and other tobacco use can lead to chronic cough that strains the pelvic floor muscles. Smoking may also damage the bladder and urethra. Talk with your provider about treatments or methods you can use to quit smoking.    If drinking large amounts of fluid causes you to have symptoms, you may be advised to limit your fluid intake. You may also be advised to drink most of your fluids during the day and to limit fluids at night.    If you re worried about urine leakage or accidents, you may wear absorbent pads to catch urine. Change the pads often. This helps reduce discomfort. It  may also reduce the risk of skin or bladder infections.  Follow-up care  Follow up with your healthcare provider, or as directed. It may take some to find the right treatment for your problem. Your treatment plan may include special therapies or medicines. Certain procedures or surgery may also be options. Be sure to discuss any questions you have with your provider.  When to seek medical advice  Call the healthcare provider right away if any of these occur:    Fever of 100.4 F (38 C) or higher, or as directed by your provider    Bladder pain or fullness    Abdominal swelling    Nausea or vomiting    Back pain    Weakness, dizziness or fainting  Date Last Reviewed: 10/1/2017    [id number] [employer]. [address] [place] [address]. All rights reserved. This information is not intended as a substitute for professional medical care. Always follow your healthcare professional's instructions.          It is recommended that you get a vaccination for shingles called Shingrix (given as 2 shots, 2 to 6 months apart), even if you have already had the Zostavax vaccine. Discuss getting the Shingix vaccine from your pharmacist, or schedule an ancillary shot visit here. Some insurances do not cover the cost of these vaccines.

## 2019-07-22 NOTE — PROGRESS NOTES
"SUBJECTIVE:   Tracie Feliciano is a 74 year old female who presents for Preventive Visit. Are you in the first 12 months of your Medicare coverage?  No    Healthy Habits:    In general, how would you rate your overall health?  Very good    Frequency of exercise:  2-3 days/week    Duration of exercise:  30-45 minutes    Do you usually eat at least 4 servings of fruit and vegetables a day, include whole grains    & fiber and avoid regularly eating high fat or \"junk\" foods?  Yes    Taking medications regularly:  Yes    Barriers to taking medications:  None    Medication side effects:  None    Ability to successfully perform activities of daily living:  No assistance needed    Home Safety:  No safety concerns identified    Hearing Impairment:  No hearing concerns    In the past 6 months, have you been bothered by leaking of urine? Yes (sometimes)    In general, how would you rate your overall mental or emotional health?  Very good      PHQ-2 Total Score:    Additional concerns today:  No    Do you feel safe in your environment? Yes    Do you have a Health Care Directive? No: Advance care planning was reviewed with patient; patient declined at this time.      Fall risk  Fallen 2 or more times in the past year?: No  Any fall with injury in the past year?: No  p  Cognitive Screening   1) Repeat 3 items (Leader, Season, Table)    2) Clock draw: NORMAL  3) 3 item recall: Recalls 3 objects  Results: 3 items recalled: COGNITIVE IMPAIRMENT LESS LIKELY    Mini-CogTM Copyright S Maverick. Licensed by the author for use in [facility]; reprinted with permission (alice@.[facility]). All rights reserved.      Do you have sleep apnea, excessive snoring or daytime drowsiness?: yes    Reviewed and updated as needed this visit by clinical staff  Tobacco  Allergies  Meds  Problems  Med Hx  Surg Hx  Fam Hx         Reviewed and updated as needed this visit by Provider  Allergies  Meds  Problems  Med Hx  Surg Hx  Fam Hx     "    Social History     Tobacco Use     Smoking status: Former Smoker     Packs/day: 0.50     Years: 1.00     Pack years: 0.50     Types: Cigarettes     Last attempt to quit: 1967     Years since quittin.5     Smokeless tobacco: Never Used   Substance Use Topics     Alcohol use: Yes     Alcohol/week: 1.5 oz     Types: 3 Standard drinks or equivalent per week     Comment: wine     If you drink alcohol do you typically have >3 drinks per day or >7 drinks per week? No    Alcohol Use 2018   Prescreen: >3 drinks/day or >7 drinks/week? No   Prescreen: >3 drinks/day or >7 drinks/week? -               Current providers sharing in care for this patient include:     Patient Care Team:  Melanie Patel MD as PCP - General  Melanie Patel MD as Assigned PCP    The following health maintenance items are reviewed in Epic and correct as of today:  Health Maintenance   Topic Date Due     ZOSTER IMMUNIZATION (2 of 3) 2011     DTAP/TDAP/TD IMMUNIZATION (3 - Td) 2019     MAMMO SCREENING  2019     INFLUENZA VACCINE (1) 2019     DEXA  2019     FALL RISK ASSESSMENT  06/10/2020     BMP  2020     LIPID  2020     MEDICARE ANNUAL WELLNESS VISIT  2020     ADVANCE CARE PLANNING  2021     COLONOSCOPY  2022     HEPATITIS C SCREENING  Completed     PHQ-2  Completed     IPV IMMUNIZATION  Aged Out     MENINGITIS IMMUNIZATION  Aged Out     Patient Active Problem List   Diagnosis     Allergic rhinitis     Hyperlipidemia with target LDL less than 130     Hypertension goal BP (blood pressure) < 140/90     Osteopenia     Advanced directives, counseling/discussion     Iron deficiency anemia     Cataract     Vitamin D deficiency     Venous (peripheral) insufficiency     Hyperparathyroidism (H)     History of left breast cancer     Urge incontinence of urine     Past Surgical History:   Procedure Laterality Date     C/SECTION, LOW TRANSVERSE  80     COLONOSCOPY  2012     Procedure: COLONOSCOPY;  COLONOSCOPY, SCREEN, PREVIOUS POLYP;  Surgeon: Maverick Maradiaga MD;  Location: [facility] OR     EYE SURGERY Bilateral 2017    cataract     HYSTERECTOMY, PAP NO LONGER INDICATED  09    BSO      LUMPECTOMY BREAST Left 2017       Social History     Tobacco Use     Smoking status: Former Smoker     Packs/day: 0.50     Years: 1.00     Pack years: 0.50     Types: Cigarettes     Last attempt to quit: 1967     Years since quittin.5     Smokeless tobacco: Never Used   Substance Use Topics     Alcohol use: Yes     Alcohol/week: 1.5 oz     Types: 3 Standard drinks or equivalent per week     Comment: wine     Family History   Problem Relation Age of Onset     Cancer Mother         d. pelvic     C.A.D. Mother         ?     C.A.D. Father 79        MI, d.     Alzheimer Disease Father      Gastrointestinal Disease Father         PUD     Cancer Maternal Aunt         GI?     Diabetes No family hx of          Current Outpatient Medications   Medication Sig Dispense Refill     Cholecalciferol (VITAMIN D) 2000 UNITS tablet Take 2,000 Units by mouth daily        ferrous sulfate (IRON) 325 (65 Fe) MG tablet Take 1 tablet (325 mg) by mouth daily (with breakfast) 90 tablet 3     losartan-hydrochlorothiazide (HYZAAR) 50-12.5 MG tablet Take 1 tablet by mouth daily 90 tablet 3     simvastatin (ZOCOR) 40 MG tablet Take 1 tablet (40 mg) by mouth daily 90 tablet 3     tamoxifen (NOLVADEX) 20 MG tablet Take 20 mg by mouth daily       timolol (TIMOPTIC) 0.5 % ophthalmic solution PLACE 1 DROP INTO THE LEFT EYE QAM  3     fluticasone (FLONASE) 50 MCG/ACT spray Spray 1-2 sprays into both nostrils daily PRN       Allergies   Allergen Reactions     Aspirin      Reactive gastropathy     Contrast Dye      sneezing     Lisinopril Cough     Review of Systems  CONSTITUTIONAL: NEGATIVE for fever, chills, change in weight  INTEGUMENTARY/SKIN: spider veins   EYES: NEGATIVE for vision changes or irritation  ENT/MOUTH:  "NEGATIVE for ear, mouth and throat problems  RESP: NEGATIVE for significant cough or SOB  BREAST: NEGATIVE for masses, tenderness or discharge  CV: lower extremity edema  GI: NEGATIVE for nausea, abdominal pain, heartburn, or change in bowel habits  : NEGATIVE for frequency, dysuria, or hematuria  MUSCULOSKELETAL: NEGATIVE for significant arthralgias or myalgia  NEURO: NEGATIVE for weakness, dizziness or paresthesias  ENDOCRINE: NEGATIVE for temperature intolerance, skin/hair changes  HEME: NEGATIVE for bleeding problems  PSYCHIATRIC: NEGATIVE for changes in mood or affect    OBJECTIVE:   /76   Pulse 75   Temp 99.8  F (37.7  C) (Oral)   Resp 18   Ht 1.626 m (5' 4\")   Wt 74.7 kg (164 lb 9.6 oz)   SpO2 97%   BMI 28.25 kg/m   Estimated body mass index is 28.25 kg/m  as calculated from the following:    Height as of this encounter: 1.626 m (5' 4\").    Weight as of this encounter: 74.7 kg (164 lb 9.6 oz).  Physical Exam  GENERAL: healthy, alert and no distress  EYES: Eyes grossly normal to inspection, PERRL and conjunctivae and sclerae normal  HENT: ear canals and TM's normal, nose and mouth without ulcers or lesions  NECK: no adenopathy, no asymmetry, masses, or scars and thyroid normal to palpation  RESP: lungs clear to auscultation - no rales, rhonchi or wheezes  BREAST: normal without masses, tenderness or nipple discharge and no palpable axillary masses or adenopathy  CV: regular rates and rhythm, normal S1 S2, no S3 or S4 and no murmur, click or rub  ABDOMEN: soft, nontender, no hepatosplenomegaly, no masses and bowel sounds normal  MS: no deformities; woody bipedal edema   SKIN: diffuse spider veins   NEURO: Normal strength and tone, mentation intact and speech normal  PSYCH: mentation appears normal, affect normal/bright    Diagnostic Test Results:  Labs reviewed in Epic  Results for orders placed or performed in visit on 07/11/19   Lipid panel reflex to direct LDL Fasting   Result Value Ref Range "    Cholesterol 158 <200 mg/dL    Triglycerides 133 <150 mg/dL    HDL Cholesterol 56 >49 mg/dL    LDL Cholesterol Calculated 75 <100 mg/dL    Non HDL Cholesterol 102 <130 mg/dL   Basic metabolic panel   Result Value Ref Range    Sodium 138 133 - 144 mmol/L    Potassium 3.8 3.4 - 5.3 mmol/L    Chloride 103 94 - 109 mmol/L    Carbon Dioxide 29 20 - 32 mmol/L    Anion Gap 6 3 - 14 mmol/L    Glucose 91 70 - 99 mg/dL    Urea Nitrogen 12 7 - 30 mg/dL    Creatinine 0.52 0.52 - 1.04 mg/dL    GFR Estimate >90 >60 mL/min/[1.73_m2]    GFR Estimate If Black >90 >60 mL/min/[1.73_m2]    Calcium 9.7 8.5 - 10.1 mg/dL   Ferritin   Result Value Ref Range    Ferritin 71 8 - 252 ng/mL   Hemoglobin   Result Value Ref Range    Hemoglobin 13.5 11.7 - 15.7 g/dL       ASSESSMENT / PLAN:   (Z00.00) Encounter for Medicare annual wellness exam  (primary encounter diagnosis)    (E78.5) Hyperlipidemia with target LDL less than 130  Comment: Well controlled with medications without side effects.   Plan: simvastatin (ZOCOR) 40 MG tablet          (E21.3) Hyperparathyroidism (H)  Comment: mild, endocrinology consult done     (I87.2) Venous (peripheral) insufficiency  Plan: VASCULAR SURGERY REFERRAL        Patient refuses compression stockings; etiology, course and treatment options again discussed     (R06.83) Snoring  Comment: Differential diagnoses would include: DENISA   Plan: sleep study refused       End of Life Planning:  Patient currently has an advanced directive: No.  I have verified the patient's ablity to prepare an advanced directive/make health care decisions.  Literature was provided to assist patient in preparing an advanced directive.    COUNSELING:  Reviewed preventive health counseling, as reflected in patient instructions  Special attention given to:       Regular exercise       Healthy diet/nutrition       Bladder control       Osteoporosis Prevention/Bone Health       Colon cancer screening       Hepatitis C screening       HIV  "screening for high risk patient       The 10-year ASCVD risk score (Justyna CLEARY Jr., et al., 2013) is: 12.7%    Values used to calculate the score:      Age: 74 years      Sex: Female      Is Non- : No      Diabetic: No      Tobacco smoker: No      Systolic Blood Pressure: 118 mmHg      Is BP treated: Yes      HDL Cholesterol: 56 mg/dL      Total Cholesterol: 158 mg/dL       Advanced Planning     Estimated body mass index is 28.25 kg/m  as calculated from the following:    Height as of this encounter: 1.626 m (5' 4\").    Weight as of this encounter: 74.7 kg (164 lb 9.6 oz).    Weight management plan: Discussed healthy diet and exercise guidelines     reports that she quit smoking about 52 years ago. Her smoking use included cigarettes. She has a 0.50 pack-year smoking history. She has never used smokeless tobacco.      Appropriate preventive services were discussed with this patient, including applicable screening as appropriate for cardiovascular disease, diabetes, osteopenia/osteoporosis, and glaucoma.  As appropriate for age/gender, discussed screening for colorectal cancer, prostate cancer, breast cancer, and cervical cancer. Checklist reviewing preventive services available has been given to the patient.    Reviewed patients plan of care and provided an AVS. The Basic Care Plan (routine screening as documented in Health Maintenance) for Tracie meets the Care Plan requirement. This Care Plan has been established and reviewed with the Patient.    Counseling Resources:  ATP IV Guidelines  Pooled Cohorts Equation Calculator  Breast Cancer Risk Calculator  FRAX Risk Assessment  ICSI Preventive Guidelines  Dietary Guidelines for Americans, 2010  [employer]'s MyPlate  ASA Prophylaxis  Lung CA Screening    Melanie Patel MD  [facility]    Identified Health Risks:  "

## 2019-07-30 ENCOUNTER — ANCILLARY PROCEDURE (OUTPATIENT)
Dept: MAMMOGRAPHY | Facility: CLINIC | Age: 74
End: 2019-07-30
Attending: FAMILY MEDICINE
Payer: MEDICARE

## 2019-07-30 DIAGNOSIS — Z12.31 VISIT FOR SCREENING MAMMOGRAM: ICD-10-CM

## 2019-07-30 PROCEDURE — 77067 SCR MAMMO BI INCL CAD: CPT | Mod: TC

## 2019-07-30 PROCEDURE — 77063 BREAST TOMOSYNTHESIS BI: CPT | Mod: TC

## 2019-08-13 ENCOUNTER — TELEPHONE (OUTPATIENT)
Dept: FAMILY MEDICINE | Facility: CLINIC | Age: 74
End: 2019-08-13

## 2019-10-29 ENCOUNTER — TRANSFERRED RECORDS (OUTPATIENT)
Dept: HEALTH INFORMATION MANAGEMENT | Facility: CLINIC | Age: 74
End: 2019-10-29

## 2019-11-05 ENCOUNTER — TRANSFERRED RECORDS (OUTPATIENT)
Dept: HEALTH INFORMATION MANAGEMENT | Facility: CLINIC | Age: 74
End: 2019-11-05

## 2019-11-14 DIAGNOSIS — D50.9 IRON DEFICIENCY ANEMIA, UNSPECIFIED IRON DEFICIENCY ANEMIA TYPE: ICD-10-CM

## 2019-11-14 DIAGNOSIS — N39.41 URGE INCONTINENCE OF URINE: Primary | ICD-10-CM

## 2019-11-15 RX ORDER — FERROUS SULFATE 325(65) MG
TABLET ORAL
Qty: 90 TABLET | Refills: 2 | Status: SHIPPED | OUTPATIENT
Start: 2019-11-15 | End: 2020-11-20

## 2019-12-13 ENCOUNTER — TELEPHONE (OUTPATIENT)
Dept: FAMILY MEDICINE | Facility: CLINIC | Age: 74
End: 2019-12-13

## 2019-12-13 DIAGNOSIS — I10 HYPERTENSION GOAL BP (BLOOD PRESSURE) < 140/90: Primary | ICD-10-CM

## 2019-12-13 NOTE — TELEPHONE ENCOUNTER
Received fax from pharmacy stating that losartan-hydrochlorothiazide (HYZAAR) 50-12.5 MG tablet is on back order. Please split the medication into 2 and send over directions to follow  Asia Lima CMA on 12/13/2019 at 9:02 AM

## 2019-12-16 DIAGNOSIS — I10 HYPERTENSION GOAL BP (BLOOD PRESSURE) < 140/90: ICD-10-CM

## 2019-12-16 RX ORDER — HYDROCHLOROTHIAZIDE 12.5 MG/1
25 TABLET ORAL DAILY
Qty: 90 TABLET | Refills: 1 | Status: SHIPPED | OUTPATIENT
Start: 2019-12-16 | End: 2019-12-16

## 2019-12-16 RX ORDER — LOSARTAN POTASSIUM 50 MG/1
50 TABLET ORAL DAILY
Qty: 90 TABLET | Refills: 1 | Status: SHIPPED | OUTPATIENT
Start: 2019-12-16 | End: 2020-09-04

## 2019-12-16 NOTE — TELEPHONE ENCOUNTER
Patient requesting 90 days     Disp Refills Start End NADIYA   hydrochlorothiazide (HYDRODIURIL) 12.5 MG tablet 90 tablet 1 12/16/2019  --   Sig - Route: Take 2 tablets (25 mg) by mouth daily - Oral   Sent to pharmacy as: hydrochlorothiazide (HYDRODIURIL) 12.5 MG tablet   Class: E-Prescribe   Order: [id number]   E-Prescribing Status: Receipt confirmed by pharmacy (12/16/2019  7:40 AM CST)

## 2019-12-16 NOTE — TELEPHONE ENCOUNTER
Signed Prescriptions:                        Disp   Refills    losartan (COZAAR) 50 MG tablet             90 tab*1        Sig: Take 1 tablet (50 mg) by mouth daily  Authorizing Provider: AILYN FAJARDO    hydrochlorothiazide (HYDRODIURIL) 12.5 MG *90 tab*1        Sig: Take 2 tablets (25 mg) by mouth daily  Authorizing Provider: AILYN FAJARDO

## 2019-12-18 RX ORDER — HYDROCHLOROTHIAZIDE 12.5 MG/1
TABLET ORAL
Qty: 180 TABLET | Refills: 1 | Status: SHIPPED | OUTPATIENT
Start: 2019-12-18 | End: 2020-05-01

## 2020-05-01 DIAGNOSIS — I10 HYPERTENSION GOAL BP (BLOOD PRESSURE) < 140/90: ICD-10-CM

## 2020-05-01 RX ORDER — HYDROCHLOROTHIAZIDE 12.5 MG/1
TABLET ORAL
Qty: 90 TABLET | Refills: 0 | Status: SHIPPED | OUTPATIENT
Start: 2020-05-01 | End: 2020-09-12

## 2020-05-18 ENCOUNTER — OFFICE VISIT (OUTPATIENT)
Dept: FAMILY MEDICINE | Facility: CLINIC | Age: 75
End: 2020-05-18
Payer: MEDICARE

## 2020-05-18 VITALS
HEART RATE: 85 BPM | RESPIRATION RATE: 12 BRPM | HEIGHT: 64 IN | OXYGEN SATURATION: 97 % | DIASTOLIC BLOOD PRESSURE: 62 MMHG | TEMPERATURE: 98.4 F | SYSTOLIC BLOOD PRESSURE: 118 MMHG | BODY MASS INDEX: 28.34 KG/M2 | WEIGHT: 166 LBS

## 2020-05-18 DIAGNOSIS — E78.2 MIXED HYPERLIPIDEMIA: ICD-10-CM

## 2020-05-18 DIAGNOSIS — N39.3 FEMALE STRESS INCONTINENCE: ICD-10-CM

## 2020-05-18 DIAGNOSIS — M21.611 BUNION, RIGHT: ICD-10-CM

## 2020-05-18 DIAGNOSIS — M17.11 PRIMARY OSTEOARTHRITIS OF RIGHT KNEE: Primary | ICD-10-CM

## 2020-05-18 DIAGNOSIS — I10 HYPERTENSION GOAL BP (BLOOD PRESSURE) < 140/90: ICD-10-CM

## 2020-05-18 PROCEDURE — 99214 OFFICE O/P EST MOD 30 MIN: CPT | Performed by: PHYSICIAN ASSISTANT

## 2020-05-18 RX ORDER — SENNOSIDES 8.6 MG
650 CAPSULE ORAL EVERY 8 HOURS PRN
Qty: 60 TABLET | Refills: 1 | Status: SHIPPED | OUTPATIENT
Start: 2020-05-18 | End: 2022-03-28

## 2020-05-18 ASSESSMENT — MIFFLIN-ST. JEOR: SCORE: 1242.97

## 2020-05-18 NOTE — PROGRESS NOTES
Tracie Feliciano is a 75 year old female who is being evaluated via a billable telephone visit.      Subjective     Tracie Feliciano is a 75 year old female who presents via phone visit today for the following health issues:    HPI  Hyperlipidemia Follow-Up      Are you regularly taking any medication or supplement to lower your cholesterol?   Yes- simvastatin (ZOCOR) 40 MG tablet     Are you having muscle aches or other side effects that you think could be caused by your cholesterol lowering medication?  No    Hypertension Follow-up      Do you check your blood pressure regularly outside of the clinic? No     Are you following a low salt diet? Yes    Are your blood pressures ever more than 140 on the top number (systolic) OR more   than 90 on the bottom number (diastolic), for example 140/90? No      How many servings of fruits and vegetables do you eat daily?  2-3    On average, how many sweetened beverages do you drink each day (Examples: soda, juice, sweet tea, etc.  Do NOT count diet or artificially sweetened beverages)?   1    How many days per week do you exercise enough to make your heart beat faster? 3 or less    How many minutes a day do you exercise enough to make your heart beat faster? 9 or less    How many days per week do you miss taking your medication? 0      Joint Pain    Onset: 2 days    Description:   Location: right knee  Character: Dull ache    Intensity: mild, moderate    Progression of Symptoms: same    Accompanying Signs & Symptoms:  Other symptoms: none    History:   Previous similar pain: no       Precipitating factors:   Trauma or overuse: YES- possible overuse     Alleviating factors:  Improved by: acetaminophen    Therapies Tried and outcome: tylenol every couple days         Urinary incontinence Patient uses an occasional pad.  Reviewed pelvic floor Physical Therapy for the future.       Review of Systems   Constitutional, HEENT, cardiovascular, pulmonary, gi and gu systems are negative,  except as otherwise noted.     EXAM:  Constitutional: healthy, alert and no distress   SKIN: no suspicious lesions or rashes  JOINT/EXTREMITIES: Lower extremities - no gross deformities noted, gait normal, normal muscle tone, decreased range of motion R knee with decreased flexion, arthritis of the noted by crepitus and Bunion R Hallux.         Assessment/Plan:  1. Primary osteoarthritis of right knee  - acetaminophen (TYLENOL) 650 MG CR tablet; Take 1 tablet (650 mg) by mouth every 8 hours as needed  Dispense: 60 tablet; Refill: 1  - [facility]; Future    2. Mixed hyperlipidemia  - Lipid panel reflex to direct LDL Fasting; Future    3. Hypertension goal BP (blood pressure) < 140/90  - Basic metabolic panel  (Ca, Cl, CO2, Creat, Gluc, K, Na, BUN); Future    4. Female stress incontinence  - Physical Therapy when COVID crisis has ended    5. Bunion, right  - Wider toe box.  Consider Podiatry referral if sx progress.     Use medication as directed.  Follow up on labs  Follow up per Physical Therapy  Follow up if symptoms should persist, change or worsen.        Jason Donahue PA-C

## 2020-05-18 NOTE — PATIENT INSTRUCTIONS
Patient Education     What Is Arthritis?  Arthritis is a disease that affects the joints. Joints are the parts where bones meet and move. It can affect any joint in your body. There are many types of arthritis. They include:     Osteoarthritis    Rheumatoid arthritis    Gout    Lupus  If your symptoms are mild, medicines may be enough to ease pain and swelling. For more severe arthritis, you may need surgery. This can improve the condition of the joint. Or it can replace part or all of the joint.      What causes arthritis?  Cartilage is a smooth substance that protects the ends of your bones and provides cushioning. When you have arthritis, this cartilage breaks down and can no longer protect your bones. This can happen from an autoimmune disease. Or it can happen from wear and tear, infections, or trauma. The bones rub against each other, causing pain and swelling. Over time, small pieces of rough or splintered bone (bone spurs) may develop. The joint's range of motion can become limited.  Symptoms  Some of the more common symptoms of arthritis include:    Joint pain and stiffness. These symptoms get worse with long periods of rest. They may also get worse from using a joint too long or too hard.    Joints that have lost normal shape and motion. They may look swollen and be hard to move.    Sore, inflamed joints. They may look red and feel warm.    Grinding or popping noise. This happens with joint movement.    Fatigue. You may feel tired all the time.  Reducing symptoms     You can help ease symptoms in these ways:    Losing weight    Exercising    Strengthening muscles around the joint to reduce the strain on the joint    Using hot and cold packs on your joints    Using over-the-counter and prescription medicines  Talk with your healthcare provider about the best treatments for your condition.  Date Last Reviewed:     [phone] [employer]. [address] [place] [address], [place] [address]. All rights  reserved. This information is not intended as a substitute for professional medical care. Always follow your healthcare professional's instructions.

## 2020-05-19 ENCOUNTER — ALLIED HEALTH/NURSE VISIT (OUTPATIENT)
Dept: ADMINISTRATIVE | Facility: OTHER | Age: 75
End: 2020-05-19

## 2020-05-19 DIAGNOSIS — I10 HYPERTENSION GOAL BP (BLOOD PRESSURE) < 140/90: ICD-10-CM

## 2020-05-19 DIAGNOSIS — E78.2 MIXED HYPERLIPIDEMIA: ICD-10-CM

## 2020-05-19 LAB
ANION GAP SERPL CALCULATED.3IONS-SCNC: 5 MMOL/L (ref 3–14)
BUN SERPL-MCNC: 11 MG/DL (ref 7–30)
CALCIUM SERPL-MCNC: 9.9 MG/DL (ref 8.5–10.1)
CHLORIDE SERPL-SCNC: 101 MMOL/L (ref 94–109)
CHOLEST SERPL-MCNC: 159 MG/DL
CO2 SERPL-SCNC: 29 MMOL/L (ref 20–32)
CREAT SERPL-MCNC: 0.51 MG/DL (ref 0.52–1.04)
GFR SERPL CREATININE-BSD FRML MDRD: >90 ML/MIN/{1.73_M2}
GLUCOSE SERPL-MCNC: 99 MG/DL (ref 70–99)
HDLC SERPL-MCNC: 60 MG/DL
LDLC SERPL CALC-MCNC: 72 MG/DL
NONHDLC SERPL-MCNC: 99 MG/DL
POTASSIUM SERPL-SCNC: 3.7 MMOL/L (ref 3.4–5.3)
SODIUM SERPL-SCNC: 135 MMOL/L (ref 133–144)
TRIGL SERPL-MCNC: 134 MG/DL

## 2020-05-19 PROCEDURE — 36415 COLL VENOUS BLD VENIPUNCTURE: CPT | Performed by: PHYSICIAN ASSISTANT

## 2020-05-19 PROCEDURE — 80061 LIPID PANEL: CPT | Performed by: PHYSICIAN ASSISTANT

## 2020-05-19 PROCEDURE — 80048 BASIC METABOLIC PNL TOTAL CA: CPT | Performed by: PHYSICIAN ASSISTANT

## 2020-06-23 NOTE — NURSING NOTE
Called pt to check on status.  She reports doing OK at this point and has been feeling better with walking.  Does not feel need to pursue PT at this time but knows where to find us if that changes.

## 2020-07-27 DIAGNOSIS — E78.5 HYPERLIPIDEMIA WITH TARGET LDL LESS THAN 130: ICD-10-CM

## 2020-07-28 RX ORDER — SIMVASTATIN 40 MG
TABLET ORAL
Qty: 90 TABLET | Refills: 2 | Status: SHIPPED | OUTPATIENT
Start: 2020-07-28 | End: 2021-07-05

## 2020-08-07 ENCOUNTER — ANCILLARY PROCEDURE (OUTPATIENT)
Dept: MAMMOGRAPHY | Facility: CLINIC | Age: 75
End: 2020-08-07
Attending: FAMILY MEDICINE
Payer: MEDICARE

## 2020-08-07 DIAGNOSIS — Z12.31 VISIT FOR SCREENING MAMMOGRAM: ICD-10-CM

## 2020-08-07 PROCEDURE — 77063 BREAST TOMOSYNTHESIS BI: CPT | Mod: TC

## 2020-08-07 PROCEDURE — 77067 SCR MAMMO BI INCL CAD: CPT | Mod: TC

## 2020-08-16 DIAGNOSIS — D50.9 IRON DEFICIENCY ANEMIA, UNSPECIFIED IRON DEFICIENCY ANEMIA TYPE: ICD-10-CM

## 2020-08-17 RX ORDER — FERROUS SULFATE 325(65) MG
TABLET ORAL
Start: 2020-08-17

## 2020-08-17 NOTE — TELEPHONE ENCOUNTER
"Routing refill request to provider for review/approval because:  Labs not current:  Hgb    Requested Prescriptions   Pending Prescriptions Disp Refills    FEROSUL 325 (65 Fe) MG tablet [Pharmacy Med Name: FERROUS SULFATE 325MG (5GR) TABS] 90 tablet 2     Sig: TAKE 1 TABLET BY MOUTH DAILY WITH BREAKFAST       Iron Supplements Failed - 8/17/2020 10:36 AM        Failed - Hgb OR Hct on record within the past 12 mos.     Patient need only have had a HGB or HCT on file in the past 12 mos. That result does not need to be normal.    Recent Labs   Lab Test 07/11/19  0858 10/17/18  1037 10/03/18  1253   HGB 13.5 11.6* 11.2*     Recent Labs   Lab Test 10/17/18  1037 10/03/18  1253 05/31/16  1023   HCT 35.9 34.8* 41.7       Please verify a HGB or HCT has been checked SINCE THE LAST DOSE CHANGE.            Passed - Patient is 12 years of age or older        Passed - Recent (12 mo) or future (30 days) visit within the authorizing provider's specialty     Patient has had an office visit with the authorizing provider or a provider within the authorizing providers department within the previous 12 mos or has a future within next 30 days. See \"Patient Info\" tab in inbasket, or \"Choose Columns\" in Meds & Orders section of the refill encounter.              Passed - Medication is active on med list           Lizbeth Richardson RN  "

## 2020-09-03 DIAGNOSIS — I10 HYPERTENSION GOAL BP (BLOOD PRESSURE) < 140/90: ICD-10-CM

## 2020-09-04 RX ORDER — LOSARTAN POTASSIUM 50 MG/1
TABLET ORAL
Qty: 90 TABLET | Refills: 1 | Status: SHIPPED | OUTPATIENT
Start: 2020-09-04 | End: 2021-03-01

## 2020-09-11 DIAGNOSIS — I10 HYPERTENSION GOAL BP (BLOOD PRESSURE) < 140/90: ICD-10-CM

## 2020-09-12 RX ORDER — HYDROCHLOROTHIAZIDE 12.5 MG/1
TABLET ORAL
Qty: 180 TABLET | Refills: 0 | Status: SHIPPED | OUTPATIENT
Start: 2020-09-12 | End: 2020-10-26

## 2020-09-12 NOTE — TELEPHONE ENCOUNTER
Prescription approved per [facility] Refill Protocol.  Gabriella Beal RN

## 2020-10-26 DIAGNOSIS — I10 HYPERTENSION GOAL BP (BLOOD PRESSURE) < 140/90: ICD-10-CM

## 2020-10-26 RX ORDER — HYDROCHLOROTHIAZIDE 12.5 MG/1
TABLET ORAL
Qty: 180 TABLET | Refills: 0 | Status: SHIPPED | OUTPATIENT
Start: 2020-10-26 | End: 2021-01-28

## 2020-10-27 NOTE — TELEPHONE ENCOUNTER
Prescription approved per [facility] Refill Protocol.  Gabriella Beal RN

## 2020-11-20 ENCOUNTER — TELEPHONE (OUTPATIENT)
Dept: FAMILY MEDICINE | Facility: CLINIC | Age: 75
End: 2020-11-20

## 2020-11-20 DIAGNOSIS — D50.9 IRON DEFICIENCY ANEMIA, UNSPECIFIED IRON DEFICIENCY ANEMIA TYPE: ICD-10-CM

## 2020-11-20 NOTE — TELEPHONE ENCOUNTER
"Spoke with pt. Saw her oncologist today . Med that she takes-Tamoxifen can cause swelling in her ankles. States oncologist was going to send in a prescription for a \"water pill\" to use as needed to [facility]. She had called [facility] to request a refill of her ferrous sulfate from Dr. Patel's office. See refill encounter.    Isa Rodriguez RN  [facility]    "

## 2020-11-20 NOTE — TELEPHONE ENCOUNTER
furosemide      Last Written Prescription Date:    Last Fill Quantity: ,   # refills:   Last Office Visit:   Future Office visit:       Routing refill request to provider for review/approval because:  Drug not active on patient's medication list  Requesting 90 day supply

## 2020-11-20 NOTE — TELEPHONE ENCOUNTER
Spoke with pt. States she had requested ferrous sulfate in the past and it wasn't filled, so she bought medication otc. She would now like the prescription for this. Please advise.    Isa Rodriguez RN  [facility]

## 2020-11-20 NOTE — TELEPHONE ENCOUNTER
Routing refill request to provider for review/approval because:  Labs not current:  HGB, hematocrit    Swati Hernandez BSN, RN

## 2020-11-23 RX ORDER — FERROUS SULFATE 325(65) MG
325 TABLET ORAL
Qty: 90 TABLET | Refills: 0 | Status: SHIPPED | OUTPATIENT
Start: 2020-11-23 | End: 2021-02-19

## 2020-11-23 NOTE — TELEPHONE ENCOUNTER
Signed Prescriptions:                        Disp   Refills    ferrous sulfate (FEROSUL) 325 (65 Fe) MG t*90 tab*0        Sig: Take 1 tablet (325 mg) by mouth daily (with           breakfast) . No further refills until follow-up           appointment  Authorizing Provider: AILYN FAJARDO

## 2021-01-28 DIAGNOSIS — I10 HYPERTENSION GOAL BP (BLOOD PRESSURE) < 140/90: ICD-10-CM

## 2021-01-28 RX ORDER — HYDROCHLOROTHIAZIDE 12.5 MG/1
TABLET ORAL
Qty: 180 TABLET | Refills: 1 | Status: SHIPPED | OUTPATIENT
Start: 2021-01-28 | End: 2021-07-06

## 2021-02-10 ENCOUNTER — OFFICE VISIT (OUTPATIENT)
Dept: FAMILY MEDICINE | Facility: CLINIC | Age: 76
End: 2021-02-10
Payer: MEDICARE

## 2021-02-10 ENCOUNTER — ANCILLARY PROCEDURE (OUTPATIENT)
Dept: GENERAL RADIOLOGY | Facility: CLINIC | Age: 76
End: 2021-02-10
Attending: NURSE PRACTITIONER
Payer: MEDICARE

## 2021-02-10 VITALS
TEMPERATURE: 98 F | HEART RATE: 81 BPM | BODY MASS INDEX: 27.99 KG/M2 | SYSTOLIC BLOOD PRESSURE: 152 MMHG | HEIGHT: 65 IN | DIASTOLIC BLOOD PRESSURE: 80 MMHG | WEIGHT: 168 LBS | OXYGEN SATURATION: 99 %

## 2021-02-10 DIAGNOSIS — M25.551 HIP PAIN, RIGHT: Primary | ICD-10-CM

## 2021-02-10 DIAGNOSIS — M54.41 ACUTE RIGHT-SIDED LOW BACK PAIN WITH RIGHT-SIDED SCIATICA: ICD-10-CM

## 2021-02-10 PROCEDURE — 99214 OFFICE O/P EST MOD 30 MIN: CPT | Performed by: NURSE PRACTITIONER

## 2021-02-10 PROCEDURE — 72170 X-RAY EXAM OF PELVIS: CPT | Performed by: RADIOLOGY

## 2021-02-10 RX ORDER — FUROSEMIDE 20 MG
20 TABLET ORAL PRN
COMMUNITY
End: 2021-07-06

## 2021-02-10 ASSESSMENT — MIFFLIN-ST. JEOR: SCORE: 1259.98

## 2021-02-10 NOTE — PROGRESS NOTES
Assessment & Plan     Hip pain, right  - XR Pelvis 1/2 Views  - [facility] REFERRAL; Future    Acute right-sided low back pain with right-sided sciatica  Counseled on self-care measures including: ice/heat, OTC acetaminophen or Voltaren gel, frequent short walks, comfortable positions; and warning signs of when to seek urgent medical care including: sudden change in bowel or bladder, fever, new numbness/tingling/weakness, worsening symptoms.  - XR Pelvis 1/2 Views  - [facility], [facility] REFERRAL; Future    Return in about 6 weeks (around 3/24/2021), or if symptoms worsen or fail to improve.    Vivian tse RN, FNP student, [facility]    I very much appreciated the opportunity to see and assess this patient in the clinic with NP student.  I agree with the above note which summarizes my findings and current recommendations. I have reviewed all diagnostics noted and performed physical exam. Changes were made in the body of the note to achieve one comprehensive document.    MK Mercado [facility] [place] is a 75 year old who presents for the following health issues     HPI       Musculoskeletal problem/pain  Onset/Duration: right hip pain and low back pain on right side  Description  Location: hip - right  Joint Swelling: no  Redness: no  Pain: YES  Warmth: no  Intensity:  moderate  Progression of Symptoms:  worsening  Accompanying signs and symptoms:   Fevers: no  Numbness/tingling/weakness: no  History  Trauma to the area: no  Recent illness:  no  Previous similar problem: YES  Previous evaluation:  no  Precipitating or alleviating factors:  Aggravating factors include: sitting  Therapies tried and outcome: acetaminophen    Patient is complaint of right hip pain for few weeks.Stated the pain aggravate with sitting for long time.Stated she usually go for a yoga and unable to do yoga due to COIVD. Denies fall, injury or  "unusual activty.    Review of Systems   Constitutional, HEENT, cardiovascular, pulmonary, gi and gu systems are negative, except as otherwise noted.      Objective    BP (!) 152/80 (BP Location: Right arm, Patient Position: Sitting, Cuff Size: Adult Regular)   Pulse 81   Temp 98  F (36.7  C) (Oral)   Ht 1.638 m (5' 4.5\")   Wt 76.2 kg (168 lb)   SpO2 99%   BMI 28.39 kg/m    Body mass index is 28.39 kg/m .  Physical Exam   GENERAL: healthy, alert and no distress  NECK: no adenopathy, no asymmetry, masses, or scars and thyroid normal to palpation  RESP: lungs clear to auscultation - no rales, rhonchi or wheezes  CV: regular rate and rhythm, normal S1 S2, no S3 or S4, no murmur, click or rub, no peripheral edema and peripheral pulses strong  ABDOMEN: soft, nontender, no hepatosplenomegaly, no masses and bowel sounds normal  MS: spine exam shows no scoliosis, ROM is normal and negative straight leg raise, tender to palpitation right SI       "

## 2021-02-10 NOTE — PATIENT INSTRUCTIONS
Patient Education     General Neck and Back Pain    Both neck and back pain are usually caused by injury to the muscles or ligaments of the spine. Sometimes the disks that separate each bone of the spine may cause pain by pressing on a nearby nerve. Back and neck pain may appear after a sudden twisting or bending force (such as in a car accident), or sometimes after a simple awkward movement. In either case, muscle spasm is often present and adds to the pain.   Acute neck and back pain usually gets better in 1 to 2 weeks. Pain related to disk disease, arthritis in the spinal joints, or narrowing of the spinal canal (spinal stenosis) can become chronic and last for months or years.   Back and neck pain are common problems. Most people feel better in 1 or 2 weeks, and most of the rest in 1 to 2 months. Most people can remain active.   People have and describe pain differently.    Pain can be sharp, stabbing, shooting, aching, cramping, or burning    Movement, standing, bending, lifting, sitting, or walking may worsen the pain    Pain can be limited to one spot or area, or it can be more generalized    Pain can spread upward, downward, to the front, or go down your arms or legs    Muscle spasm may occur.  Most of the time mechanical problems with the muscles or spine cause the pain. It's usually caused by an injury, whether known or not, to the muscles or ligaments. Pain without an injury is not common. But it can sometimes be caused by a health problem such as kidney stones or an infection. Pain is usually related to physical activity such as sports, exercise, work, or normal activity. Sometimes it can occur without an identifiable cause. This can happen simply by stretching or moving wrong, without noting pain at the time. Other causes include:     Overexertion, lifting, pushing, pulling incorrectly or too aggressively.    Sudden twisting, bending or stretching from an accident (car or fall), or accidental  movement.    Poor posture    Poor conditioning, lack of regular exercise    Spinal disc disease or arthritis    Stress    Pregnancy, or illness like appendicitis, bladder or kidney infection, pelvic infections   Home care    For neck pain: Use a comfortable pillow that supports the head and keeps the spine in a neutral position. The position of the head should not be tilted forward or backward.    When in bed, try to find a position of comfort. A firm mattress is best. Try lying flat on your back with pillows under your knees. You can also try lying on your side with your knees bent up towards your chest and a pillow between your knees.    At first, don't try to stretch out the sore spots. If there is a strain, it's not like the good soreness you get after exercising without an injury. In this case, stretching may make it worse.    Don't sit for long periods, as in long car rides or other travel. This puts more stress on the lower back than standing or walking.    During the first 24 to 72 hours after an injury, apply an ice pack to the painful area for 20 minutes and then remove it for 20 minutes over a period of 60 to 90 minutes or several times a day.     You can alternate ice and heat therapies. Talk with your healthcare provider about the best treatment for your back or neck pain. As a safety precaution, don't use a heating pad at bedtime. Sleeping with a heating pad can lead to skin burns or tissue damage.    Therapeutic massage can help relax the back and neck muscles without stretching them.    Be aware of safe lifting methods and don't lift anything over 15 pounds until all the pain is gone.    Medicines  Talk to your healthcare provider before using medicine, especially if you have other medical problems or are taking other medicines.     You may use over-the-counter medicine to control pain, unless another pain medicine was prescribed. Talk with your doctor first if you have chronic conditions like  diabetes, liver or kidney disease, stomach ulcers, gastrointestinal bleeding, or are taking blood thinner medicines.    Be careful if you are given pain medicines, narcotics, or medicine for muscle spasm. They can cause drowsiness, and can affect your coordination, reflexes, and judgment. Don't drive or operate heavy machinery.  Follow-up care  Follow up with your healthcare provider, or as advised. You may need physical therapy or further tests.   If X-rays were taken, you will be told of any new findings that may affect your care.   Call 911  Call 911 if any of the following occur:     Trouble breathing    Confusion    Very drowsy or trouble awakening    Fainting or loss of consciousness    Rapid or very slow heart rate    Loss of bowel or bladder control  When to seek medical advice  Call your healthcare provider right away if any of these occur:    Pain becomes worse or spreads into your arms or legs    Weakness, numbness or pain in one or both arms or legs    Numbness in the groin area    Trouble walking    Fever of 100.4 F (38 C) or higher, or as directed by your healthcare provider  Lindsay last reviewed this educational content on 10/1/2019    8581-5240 [employer]. [address] [place] [address]. All rights reserved. This information is not intended as a substitute for professional medical care. Always follow your healthcare professional's instructions.

## 2021-02-19 DIAGNOSIS — D50.9 IRON DEFICIENCY ANEMIA, UNSPECIFIED IRON DEFICIENCY ANEMIA TYPE: ICD-10-CM

## 2021-02-19 RX ORDER — FERROUS SULFATE 325(65) MG
325 TABLET ORAL
Qty: 90 TABLET | Refills: 0 | Status: SHIPPED | OUTPATIENT
Start: 2021-02-19 | End: 2021-05-24

## 2021-02-25 DIAGNOSIS — I10 HYPERTENSION GOAL BP (BLOOD PRESSURE) < 140/90: ICD-10-CM

## 2021-02-26 ENCOUNTER — THERAPY VISIT (OUTPATIENT)
Dept: PHYSICAL THERAPY | Facility: CLINIC | Age: 76
End: 2021-02-26
Payer: MEDICARE

## 2021-02-26 DIAGNOSIS — M25.551 HIP PAIN, RIGHT: ICD-10-CM

## 2021-02-26 DIAGNOSIS — M54.41 ACUTE RIGHT-SIDED LOW BACK PAIN WITH RIGHT-SIDED SCIATICA: ICD-10-CM

## 2021-02-26 PROCEDURE — 97110 THERAPEUTIC EXERCISES: CPT | Mod: GP | Performed by: PHYSICAL THERAPIST

## 2021-02-26 PROCEDURE — 97161 PT EVAL LOW COMPLEX 20 MIN: CPT | Mod: GP | Performed by: PHYSICAL THERAPIST

## 2021-02-26 ASSESSMENT — ACTIVITIES OF DAILY LIVING (ADL)
DEEP_SQUATTING: NO DIFFICULTY AT ALL
HEAVY_WORK: NO DIFFICULTY AT ALL
RECREATIONAL_ACTIVITIES: NO DIFFICULTY AT ALL
LIGHT_TO_MODERATE_WORK: NO DIFFICULTY AT ALL
WALKING_APPROXIMATELY_10_MINUTES: NO DIFFICULTY AT ALL
HOS_ADL_COUNT: 14
SITTING_FOR_15_MINUTES: NO DIFFICULTY AT ALL
STEPPING_UP_AND_DOWN_CURBS: MODERATE DIFFICULTY
WALKING_15_MINUTES_OR_GREATER: SLIGHT DIFFICULTY
HOS_ADL_HIGHEST_POTENTIAL_SCORE: 56
TWISTING/PIVOTING_ON_INVOLVED_LEG: NO DIFFICULTY AT ALL
WALKING_UP_STEEP_HILLS: NO DIFFICULTY AT ALL
WALKING_DOWN_STEEP_HILLS: NO DIFFICULTY AT ALL
HOS_ADL_SCORE(%): 89.29
GETTING_INTO_AND_OUT_OF_A_BATHTUB: NO DIFFICULTY AT ALL
GOING_DOWN_1_FLIGHT_OF_STAIRS: MODERATE DIFFICULTY
HOS_ADL_ITEM_SCORE_TOTAL: 50
WALKING_INITIALLY: SLIGHT DIFFICULTY
ROLLING_OVER_IN_BED: NO DIFFICULTY AT ALL

## 2021-02-26 NOTE — PROGRESS NOTES
[facility] Initial Evaluation  Subjective:    Patient Health History           General health as reported by patient is good.  Pertinent medical history includes: none.   Red flags:  None as reported by patient.  Medical allergies: none.   Surgeries include:  Other.    Current medications:  High blood pressure medication.    Current occupation is retired.                                       Objective:  System    Physical Exam    General     ROS    Assessment/Plan:

## 2021-02-26 NOTE — LETTER
DEPARTMENT OF HEALTH AND HUMAN SERVICES  CENTERS FOR MEDICARE & MEDICAID SERVICES    PLAN/UPDATED PLAN OF PROGRESS FOR OUTPATIENT REHABILITATION          PATIENTS NAME:  Tracie Feliciano   : 1945  PROVIDER NUMBER:    [id number]  HICN: [id number]    PROVIDER NAME: [facility]  MEDICAL RECORD NUMBER: [id number]   START OF CARE DATE:  SOC Date: 21   TYPE:  PT    PRIMARY/TREATMENT DIAGNOSIS: (Pertinent Medical Diagnosis)     Hip pain, right  Acute right-sided low back pain with right-sided sciatica    VISITS FROM START OF CARE:  Rxs Used: 1     [facility] Initial Evaluation  Subjective:  Therapist Generated HPI Evaluation  Problem details: Patient reports the onset of low back pain 3 months ago and she is unable to explain why. She reports no history of low back pain. She reports being active and does a yoga class 2 x per week and strength trains her upper body 1 x per week.         Type of problem:  Lumbar.    This is a new condition.  Condition occurred with:  Insidious onset.  Where condition occurred: for unknown reasons.  Patient reports pain:  Lumbar spine right.  Pain is described as aching and is constant.  Pain radiates to:  No radiation. Pain is worse during the day.  Since onset symptoms are unchanged.  Associated symptoms:  Loss of motion/stiffness. Symptoms are exacerbated by bending  and relieved by activity/movement.  Special tests included:  X-ray (see epic).  Past treatment: noen.   Restrictions due to condition include:  Working in normal job without restrictions.  Barriers include:  None as reported by patient.  MD alexis 2/10/2021    Patient Health History  General health as reported by patient is good.  Pertinent medical history includes: none.   Red flags:  None as reported by patient.  Medical allergies: none.   Surgeries include:  Other.    Current medications:  High blood pressure medication.    Current occupation is retired.   PATIENTS NAME:  Tracie Feliciano    : 1945              Objective:  Standing Alignment:    Cervical/Thoracic:  Forward head  Shoulder/UE:  Rounded shoulders    Lumbar/SI Evaluation  ROM:    AROM Lumbar:   Flexion:            Finger tips to floor, no pain  Ext:                    Moderate limitation, mild pain   Side Bend:        Left:  Finger tips to knee, no pain    Right:  Finger tips to knee, no pain  Rotation:           Left:  WNL, mild pain    Right:  WNL, mild pain  Side Glide:        Left:     Right:         Lumbar Myotomes:  Lumbar myotomes: grossly 4+/5 except hip flexion 4/5 on right.  Lumbar Dermtomes:  normal    Neural Tension/Mobility:    Left side:SLR; SLR w/DF or Slump  negative.   Right side:   SLR positive.  Right side:   SLR w/DF or Slump  negative.     Lumbar Palpation:    Tenderness present at Right: PSIS and Gluteus Medius    Lumbar Provocation:    Left negative with:  PROM hip  Right positive with: PROM hip    Spinal Segmental Conclusions:   Level: Hypo noted at L2, L1, T12 and L3    SI joint/Sacrum:    unremarkable    Jeovanny Lumbar Evaluation  Test Movements:  FIS: During: no effect  After: no effect  Mechanical Response: no effect  Repeat FIS: During: no effect  After: no effect  Mechanical Response: IncROM  EIS: During: no effect  After: no effect  Mechanical Response: no effect  Repeat EIS: During: no effect  After: no effect  Mechanical Response: no effect    Assessment/Plan:    Patient is a 75 year old female with lumbar complaints.    Patient has the following significant findings with corresponding treatment plan.                Diagnosis 1:  Low back pain  Pain -  hot/cold therapy, manual therapy, self management, education and home program  Decreased ROM/flexibility - manual therapy, therapeutic exercise, therapeutic activity and home program  Decreased joint mobility - manual therapy, therapeutic exercise, therapeutic activity and home program  Decreased strength - therapeutic exercise, therapeutic  activities and home program  Decreased function - therapeutic activities and home program  Impaired posture - neuro re-education, therapeutic activities and home program    Therapy Evaluation Codes:   1) History comprised of:   Personal factors that impact the plan of care:      None.    Comorbidity factors that impact the plan of care are:      Osteopenia.     Medications impacting care: None.  2) Examination of Body Systems comprised of:   Body structures and functions that impact the plan of care:      Lumbar spine.   Activity limitations that impact the plan of care are:      Bathing, Dressing, Lifting, Reading/Computer work and Squatting/kneeling.  3) Clinical presentation characteristics are:   Stable/Uncomplicated.  4) Decision-Making    Low complexity using standardized patient assessment instrument and/or measureable assessment of functional outcome.  Cumulative Therapy Evaluation is: Low complexity.    Previous and current functional limitations:  (See Goal Flow Sheet for this information)    Short term and Long term goals: (See Goal Flow Sheet for this information)     Communication ability:  Patient appears to be able to clearly communicate and understand verbal and written communication and follow directions correctly.  Treatment Explanation - The following has been discussed with the patient:   RX ordered/plan of care  Anticipated outcomes  Possible risks and side effects  This patient would benefit from PT intervention to resume normal activities.   Rehab potential is good.    Frequency:  1 X week, once daily  Duration:  for 6 weeks  Discharge Plan:  Achieve all LTG.  Independent in home treatment program.  Reach maximal therapeutic benefit.    Please refer to the daily flowsheet for treatment today, total treatment time and time spent performing 1:1 timed codes.                       PATIENTS NAME:  Tracie Feliciano   : 1945    Caregiver Signature/Credentials _____________________________ Date  "________       Treating Provider: Ludwin Crenshaw, PT, DPT   I have reviewed and certified the need for these services and plan of treatment while under my care.        PHYSICIAN'S SIGNATURE:   _________________________________________  Date___________   MK Mcnair CNP    Certification period:  Beginning of Cert date period: 02/26/21 to  End of Cert period date: 05/27/21     Functional Level Progress Report: Please see attached \"Goal Flow sheet for Functional level.\"    ____X____ Continue Services or       ________ DC Services                Service dates: From  SOC Date: 02/26/21 date to present                         "

## 2021-02-26 NOTE — PROGRESS NOTES
[facility] Initial Evaluation  Subjective:    Therapist Generated HPI Evaluation  Problem details: Patient reports the onset of low back pain 3 months ago and she is unable to explain why. She reports no history of low back pain. She reports being active and does a yoga class 2 x per week and strength trains her upper body 1 x per week.         Type of problem:  Lumbar.    This is a new condition.  Condition occurred with:  Insidious onset.  Where condition occurred: for unknown reasons.  Patient reports pain:  Lumbar spine right.  Pain is described as aching and is constant.  Pain radiates to:  No radiation. Pain is worse during the day.  Since onset symptoms are unchanged.  Associated symptoms:  Loss of motion/stiffness. Symptoms are exacerbated by bending  and relieved by activity/movement.  Special tests included:  X-ray (see epic).  Past treatment: noen.   Restrictions due to condition include:  Working in normal job without restrictions.  Barriers include:  None as reported by patient.                        Objective:  Standing Alignment:    Cervical/Thoracic:  Forward head  Shoulder/UE:  Rounded shoulders                             Lumbar/SI Evaluation  ROM:    AROM Lumbar:   Flexion:            Finger tips to floor, no pain  Ext:                    Moderate limitation, mild pain   Side Bend:        Left:  Finger tips to knee, no pain    Right:  Finger tips to knee, no pain  Rotation:           Left:  WNL, mild pain    Right:  WNL, mild pain  Side Glide:        Left:     Right:           Lumbar Myotomes:  Lumbar myotomes: grossly 4+/5 except hip flexion 4/5 on right.                Lumbar Dermtomes:  normal                Neural Tension/Mobility:      Left side:SLR; SLR w/DF or Slump  negative.   Right side:   SLR positive.  Right side:   SLR w/DF or Slump  negative.   Lumbar Palpation:      Tenderness present at Right: PSIS and Gluteus Medius    Lumbar Provocation:      Left negative  with:  PROM hip  Right positive with: PROM hip  Spinal Segmental Conclusions:     Level: Hypo noted at L2, L1, T12 and L3      SI joint/Sacrum:    unremarkable                                                         Jeovanny Lumbar Evaluation        Test Movements:  FIS: During: no effect  After: no effect  Mechanical Response: no effect  Repeat FIS: During: no effect  After: no effect  Mechanical Response: IncROM  EIS: During: no effect  After: no effect  Mechanical Response: no effect  Repeat EIS: During: no effect  After: no effect  Mechanical Response: no effect                                                     ROS    Assessment/Plan:    Patient is a 75 year old female with lumbar complaints.    Patient has the following significant findings with corresponding treatment plan.                Diagnosis 1:  Low back pain  Pain -  hot/cold therapy, manual therapy, self management, education and home program  Decreased ROM/flexibility - manual therapy, therapeutic exercise, therapeutic activity and home program  Decreased joint mobility - manual therapy, therapeutic exercise, therapeutic activity and home program  Decreased strength - therapeutic exercise, therapeutic activities and home program  Decreased function - therapeutic activities and home program  Impaired posture - neuro re-education, therapeutic activities and home program    Therapy Evaluation Codes:   1) History comprised of:   Personal factors that impact the plan of care:      None.    Comorbidity factors that impact the plan of care are:      Osteopenia.     Medications impacting care: None.  2) Examination of Body Systems comprised of:   Body structures and functions that impact the plan of care:      Lumbar spine.   Activity limitations that impact the plan of care are:      Bathing, Dressing, Lifting, Reading/Computer work and Squatting/kneeling.  3) Clinical presentation characteristics are:   Stable/Uncomplicated.  4) Decision-Making    Low  complexity using standardized patient assessment instrument and/or measureable assessment of functional outcome.  Cumulative Therapy Evaluation is: Low complexity.    Previous and current functional limitations:  (See Goal Flow Sheet for this information)    Short term and Long term goals: (See Goal Flow Sheet for this information)     Communication ability:  Patient appears to be able to clearly communicate and understand verbal and written communication and follow directions correctly.  Treatment Explanation - The following has been discussed with the patient:   RX ordered/plan of care  Anticipated outcomes  Possible risks and side effects  This patient would benefit from PT intervention to resume normal activities.   Rehab potential is good.    Frequency:  1 X week, once daily  Duration:  for 6 weeks  Discharge Plan:  Achieve all LTG.  Independent in home treatment program.  Reach maximal therapeutic benefit.    Please refer to the daily flowsheet for treatment today, total treatment time and time spent performing 1:1 timed codes.

## 2021-02-28 NOTE — TELEPHONE ENCOUNTER
Routing refill request to provider for review/approval because:  BP Readings from Last 3 Encounters:   02/10/21 (!) 152/80   05/18/20 118/62   07/22/19 118/76     Creatinine   Date Value Ref Range Status   05/19/2020 0.51 (L) 0.52 - 1.04 mg/dL Final

## 2021-03-01 DIAGNOSIS — I10 HYPERTENSION GOAL BP (BLOOD PRESSURE) < 140/90: ICD-10-CM

## 2021-03-01 RX ORDER — LOSARTAN POTASSIUM 50 MG/1
TABLET ORAL
Qty: 30 TABLET | Refills: 0 | Status: SHIPPED | OUTPATIENT
Start: 2021-03-01 | End: 2021-06-07

## 2021-03-03 RX ORDER — LOSARTAN POTASSIUM 50 MG/1
TABLET ORAL
Qty: 90 TABLET | OUTPATIENT
Start: 2021-03-03

## 2021-03-08 ENCOUNTER — IMMUNIZATION (OUTPATIENT)
Dept: NURSING | Facility: CLINIC | Age: 76
End: 2021-03-08
Payer: MEDICARE

## 2021-03-08 PROCEDURE — 0031A PR COVID VAC JANSSEN AD26 0.5ML: CPT

## 2021-03-08 PROCEDURE — 91303 PR COVID VAC JANSSEN AD26 0.5ML: CPT

## 2021-03-12 ENCOUNTER — THERAPY VISIT (OUTPATIENT)
Dept: PHYSICAL THERAPY | Facility: CLINIC | Age: 76
End: 2021-03-12
Payer: MEDICARE

## 2021-03-12 DIAGNOSIS — M25.551 HIP PAIN, RIGHT: Primary | ICD-10-CM

## 2021-03-12 DIAGNOSIS — M54.41 ACUTE RIGHT-SIDED LOW BACK PAIN WITH RIGHT-SIDED SCIATICA: ICD-10-CM

## 2021-03-12 PROCEDURE — 97140 MANUAL THERAPY 1/> REGIONS: CPT | Mod: GP | Performed by: PHYSICAL THERAPIST

## 2021-03-12 PROCEDURE — 97110 THERAPEUTIC EXERCISES: CPT | Mod: GP | Performed by: PHYSICAL THERAPIST

## 2021-03-22 ENCOUNTER — THERAPY VISIT (OUTPATIENT)
Dept: PHYSICAL THERAPY | Facility: CLINIC | Age: 76
End: 2021-03-22
Payer: MEDICARE

## 2021-03-22 DIAGNOSIS — M25.551 HIP PAIN, RIGHT: Primary | ICD-10-CM

## 2021-03-22 DIAGNOSIS — M54.41 ACUTE RIGHT-SIDED LOW BACK PAIN WITH RIGHT-SIDED SCIATICA: ICD-10-CM

## 2021-03-22 PROCEDURE — 97110 THERAPEUTIC EXERCISES: CPT | Mod: GP | Performed by: PHYSICAL THERAPIST

## 2021-03-22 PROCEDURE — 97140 MANUAL THERAPY 1/> REGIONS: CPT | Mod: GP | Performed by: PHYSICAL THERAPIST

## 2021-03-30 ENCOUNTER — THERAPY VISIT (OUTPATIENT)
Dept: PHYSICAL THERAPY | Facility: CLINIC | Age: 76
End: 2021-03-30
Payer: MEDICARE

## 2021-03-30 DIAGNOSIS — M54.41 BILATERAL LOW BACK PAIN WITH RIGHT-SIDED SCIATICA: Primary | ICD-10-CM

## 2021-03-30 PROCEDURE — 97110 THERAPEUTIC EXERCISES: CPT | Mod: GP | Performed by: PHYSICAL THERAPIST

## 2021-03-30 PROCEDURE — 97140 MANUAL THERAPY 1/> REGIONS: CPT | Mod: GP | Performed by: PHYSICAL THERAPIST

## 2021-04-15 ENCOUNTER — THERAPY VISIT (OUTPATIENT)
Dept: PHYSICAL THERAPY | Facility: CLINIC | Age: 76
End: 2021-04-15
Payer: MEDICARE

## 2021-04-15 DIAGNOSIS — M25.551 HIP PAIN, RIGHT: ICD-10-CM

## 2021-04-15 DIAGNOSIS — M54.41 ACUTE RIGHT-SIDED LOW BACK PAIN WITH RIGHT-SIDED SCIATICA: Primary | ICD-10-CM

## 2021-04-15 PROCEDURE — 97110 THERAPEUTIC EXERCISES: CPT | Mod: GP | Performed by: PHYSICAL THERAPIST

## 2021-04-20 ENCOUNTER — THERAPY VISIT (OUTPATIENT)
Dept: PHYSICAL THERAPY | Facility: CLINIC | Age: 76
End: 2021-04-20
Payer: MEDICARE

## 2021-04-20 DIAGNOSIS — M25.551 HIP PAIN, RIGHT: ICD-10-CM

## 2021-04-20 DIAGNOSIS — M54.41 ACUTE RIGHT-SIDED LOW BACK PAIN WITH RIGHT-SIDED SCIATICA: Primary | ICD-10-CM

## 2021-04-20 PROCEDURE — 97110 THERAPEUTIC EXERCISES: CPT | Mod: GP | Performed by: PHYSICAL THERAPIST

## 2021-04-20 PROCEDURE — 97140 MANUAL THERAPY 1/> REGIONS: CPT | Mod: GP | Performed by: PHYSICAL THERAPIST

## 2021-04-27 ENCOUNTER — THERAPY VISIT (OUTPATIENT)
Dept: PHYSICAL THERAPY | Facility: CLINIC | Age: 76
End: 2021-04-27
Payer: MEDICARE

## 2021-04-27 DIAGNOSIS — M25.551 HIP PAIN, RIGHT: ICD-10-CM

## 2021-04-27 DIAGNOSIS — M54.41 ACUTE RIGHT-SIDED LOW BACK PAIN WITH RIGHT-SIDED SCIATICA: Primary | ICD-10-CM

## 2021-04-27 PROCEDURE — 97110 THERAPEUTIC EXERCISES: CPT | Mod: GP | Performed by: PHYSICAL THERAPIST

## 2021-05-17 DIAGNOSIS — D50.9 IRON DEFICIENCY ANEMIA, UNSPECIFIED IRON DEFICIENCY ANEMIA TYPE: ICD-10-CM

## 2021-05-18 ENCOUNTER — THERAPY VISIT (OUTPATIENT)
Dept: PHYSICAL THERAPY | Facility: CLINIC | Age: 76
End: 2021-05-18
Payer: MEDICARE

## 2021-05-18 DIAGNOSIS — M25.551 HIP PAIN, RIGHT: ICD-10-CM

## 2021-05-18 DIAGNOSIS — M54.41 ACUTE RIGHT-SIDED LOW BACK PAIN WITH RIGHT-SIDED SCIATICA: Primary | ICD-10-CM

## 2021-05-18 PROCEDURE — 97530 THERAPEUTIC ACTIVITIES: CPT | Mod: GP | Performed by: PHYSICAL THERAPIST

## 2021-05-18 PROCEDURE — 97110 THERAPEUTIC EXERCISES: CPT | Mod: GP | Performed by: PHYSICAL THERAPIST

## 2021-05-18 RX ORDER — FERROUS SULFATE 325(65) MG
TABLET ORAL
Start: 2021-05-18

## 2021-06-02 DIAGNOSIS — I10 HYPERTENSION GOAL BP (BLOOD PRESSURE) < 140/90: ICD-10-CM

## 2021-06-07 DIAGNOSIS — I10 HYPERTENSION GOAL BP (BLOOD PRESSURE) < 140/90: ICD-10-CM

## 2021-06-07 RX ORDER — LOSARTAN POTASSIUM 50 MG/1
TABLET ORAL
Qty: 30 TABLET | Refills: 0 | Status: SHIPPED | OUTPATIENT
Start: 2021-06-07 | End: 2021-06-09

## 2021-06-07 NOTE — TELEPHONE ENCOUNTER
Pt has an appt on 7/6/21.    Prescription approved per [facility] Refill Protocol.    Isa Rodriguez RN  [facility]

## 2021-06-09 RX ORDER — LOSARTAN POTASSIUM 50 MG/1
TABLET ORAL
Qty: 90 TABLET | Refills: 0 | Status: SHIPPED | OUTPATIENT
Start: 2021-06-09 | End: 2021-07-05

## 2021-06-09 NOTE — TELEPHONE ENCOUNTER
Requests 90 day supply    Routing refill request to provider for review/approval because:  An given x1 per protocol    losartan (COZAAR) 50 MG tablet 30 tablet 0 6/7/2021  No   Sig: TAKE 1 TABLET BY MOUTH EVERY DAY   Sent to pharmacy as: Losartan Potassium 50 MG Oral Tablet (COZAAR)   Class: E-Prescribe   Notes to Pharmacy: Needs appointment prior to further refills   Order: [id number]   E-Prescribing Status: Receipt confirmed by pharmacy (6/7/2021 10:33 AM CDT)   Printout Tracking    External Result Report   Pharmacy    [id number] - [place] - [address] AT [facility] & [address]

## 2021-06-14 ENCOUNTER — THERAPY VISIT (OUTPATIENT)
Dept: PHYSICAL THERAPY | Facility: CLINIC | Age: 76
End: 2021-06-14
Payer: MEDICARE

## 2021-06-14 DIAGNOSIS — M25.551 HIP PAIN, RIGHT: ICD-10-CM

## 2021-06-14 DIAGNOSIS — M54.41 ACUTE RIGHT-SIDED LOW BACK PAIN WITH RIGHT-SIDED SCIATICA: Primary | ICD-10-CM

## 2021-06-14 PROCEDURE — 97110 THERAPEUTIC EXERCISES: CPT | Mod: GP | Performed by: PHYSICAL THERAPIST

## 2021-06-14 PROCEDURE — 97530 THERAPEUTIC ACTIVITIES: CPT | Mod: GP | Performed by: PHYSICAL THERAPIST

## 2021-06-14 NOTE — LETTER
DEPARTMENT OF HEALTH AND HUMAN SERVICES  CENTERS FOR MEDICARE & MEDICAID SERVICES    PLAN/UPDATED PLAN OF PROGRESS FOR OUTPATIENT REHABILITATION          PATIENTS NAME:  Tracie Feliciano   : 1945  PROVIDER NUMBER:    [id number]  [id number]   [facility] [place]  MEDICAL RECORD NUMBER: [id number]   START OF CARE DATE:  SOC Date: 21   TYPE:  PT    PRIMARY/TREATMENT DIAGNOSIS: (Pertinent Medical Diagnosis)     Acute right-sided low back pain with right-sided sciatica  Hip pain, right    VISITS FROM START OF CARE:  Rxs Used: 9     DISCHARGE REPORT  Progress reporting period is from 2021 to 2021.       SUBJECTIVE  Subjective changes noted by patient:   Subjective: Patient reports driving for several hours and she tolerated it well. Patient is still doing yoga 1 day per week    Current pain level is  Current Pain level: 0/10.     Previous pain level was   Initial Pain level: 5/10.   Changes in function:  Yes (See Goal flowsheet attached for changes in current functional level)  Adverse reaction to treatment or activity: None    OBJECTIVE  Changes noted in objective findings:  Yes,   Lumbar spine AROM WNL throughout.   Lumbar myotomes grossly 5/5 bilaterally  Right hip AROM WFL throughout  Right hip strength grossly 5/5 except hip abduction 4+/5    ASSESSMENT/PLAN  Updated problem list and treatment plan: Diagnosis 1:  Low back pain    STG/LTGs have been met or progress has been made towards goals:  Yes (See Goal flow sheet completed today.)  Assessment of Progress: The patient's condition is improving.  The patient has met all of their long term goals.  Self Management Plans:  Patient is independent in a home treatment program.  Patient is independent in self management of symptoms.  I have re-evaluated this patient and find that the nature, scope, duration and intensity of the therapy is appropriate for the medical condition of the patient.  Tracie  "continues to require the following intervention to meet STG and LTG's:  PT intervention is no longer required to meet STG/LTG.    PATIENTS NAME:  Tracie Feliciano   : 1945    Recommendations:  This patient is ready to be discharged from therapy and continue their home treatment program.    Please refer to the daily flowsheet for treatment today, total treatment time and time spent performing 1:1 timed codes.    Caregiver Signature/Credentials _____________________________ Date ________       Treating Provider: Ludwin Crenshaw, PT, DPT   I have reviewed and certified the need for these services and plan of treatment while under my care.        PHYSICIAN'S SIGNATURE:   _________________________________________  Date___________   MK Mcnair CNP    Certification period:  Beginning of Cert date period: 2021 to  End of Cert period date:  2021    Functional Level Progress Report: Please see attached \"Goal Flow sheet for Functional level.\"    ________ Continue Services or       _____X___ DC Services                Service dates: From  SOC Date: 21 date to present                         "

## 2021-06-14 NOTE — PROGRESS NOTES
Subjective:  HPI  Physical Exam                    Objective:  System    Physical Exam    General     ROS    Assessment/Plan:    DISCHARGE REPORT    Progress reporting period is from 4/27/2021 to 6/14/2021.       SUBJECTIVE  Subjective changes noted by patient:   Subjective: Patient reports driving for several hours and she tolerated it well. Patient is still doing yoga 1 day per week    Current pain level is  Current Pain level: 0/10.     Previous pain level was   Initial Pain level: 5/10.   Changes in function:  Yes (See Goal flowsheet attached for changes in current functional level)  Adverse reaction to treatment or activity: None    OBJECTIVE  Changes noted in objective findings:  Yes,   Lumbar spine AROM WNL throughout.   Lumbar myotomes grossly 5/5 bilaterally  Right hip AROM WFL throughout  Right hip strength grossly 5/5 except hip abduction 4+/5          ASSESSMENT/PLAN  Updated problem list and treatment plan: Diagnosis 1:  Low back pain    STG/LTGs have been met or progress has been made towards goals:  Yes (See Goal flow sheet completed today.)  Assessment of Progress: The patient's condition is improving.  The patient has met all of their long term goals.  Self Management Plans:  Patient is independent in a home treatment program.  Patient is independent in self management of symptoms.  I have re-evaluated this patient and find that the nature, scope, duration and intensity of the therapy is appropriate for the medical condition of the patient.  Tracie continues to require the following intervention to meet STG and LTG's:  PT intervention is no longer required to meet STG/LTG.    Recommendations:  This patient is ready to be discharged from therapy and continue their home treatment program.    Please refer to the daily flowsheet for treatment today, total treatment time and time spent performing 1:1 timed codes.

## 2021-07-05 DIAGNOSIS — I10 HYPERTENSION GOAL BP (BLOOD PRESSURE) < 140/90: ICD-10-CM

## 2021-07-05 RX ORDER — LOSARTAN POTASSIUM 50 MG/1
TABLET ORAL
Qty: 90 TABLET | Refills: 0 | Status: SHIPPED | OUTPATIENT
Start: 2021-07-05 | End: 2021-07-06

## 2021-07-05 RX ORDER — FUROSEMIDE 20 MG
20 TABLET ORAL PRN
Status: CANCELLED | OUTPATIENT
Start: 2021-07-05

## 2021-07-06 ENCOUNTER — OFFICE VISIT (OUTPATIENT)
Dept: FAMILY MEDICINE | Facility: CLINIC | Age: 76
End: 2021-07-06
Payer: MEDICARE

## 2021-07-06 VITALS
DIASTOLIC BLOOD PRESSURE: 71 MMHG | HEART RATE: 73 BPM | HEIGHT: 65 IN | WEIGHT: 168 LBS | TEMPERATURE: 97.3 F | BODY MASS INDEX: 27.99 KG/M2 | SYSTOLIC BLOOD PRESSURE: 124 MMHG | OXYGEN SATURATION: 97 %

## 2021-07-06 DIAGNOSIS — C50.912 MALIGNANT NEOPLASM OF LEFT FEMALE BREAST, UNSPECIFIED ESTROGEN RECEPTOR STATUS, UNSPECIFIED SITE OF BREAST (H): ICD-10-CM

## 2021-07-06 DIAGNOSIS — Z86.39 HISTORY OF IRON DEFICIENCY: ICD-10-CM

## 2021-07-06 DIAGNOSIS — E83.52 HYPERCALCEMIA: ICD-10-CM

## 2021-07-06 DIAGNOSIS — I10 HYPERTENSION GOAL BP (BLOOD PRESSURE) < 140/90: Primary | ICD-10-CM

## 2021-07-06 DIAGNOSIS — I87.2 VENOUS (PERIPHERAL) INSUFFICIENCY: ICD-10-CM

## 2021-07-06 DIAGNOSIS — E78.5 HYPERLIPIDEMIA WITH TARGET LDL LESS THAN 130: ICD-10-CM

## 2021-07-06 PROBLEM — N39.41 URGE INCONTINENCE OF URINE: Status: ACTIVE | Noted: 2018-08-01

## 2021-07-06 PROBLEM — Z85.3 HISTORY OF LEFT BREAST CANCER: Status: RESOLVED | Noted: 2018-06-20 | Resolved: 2021-07-06

## 2021-07-06 LAB
ANION GAP SERPL CALCULATED.3IONS-SCNC: 6 MMOL/L (ref 3–14)
BUN SERPL-MCNC: 9 MG/DL (ref 7–30)
CALCIUM SERPL-MCNC: 10.4 MG/DL (ref 8.5–10.1)
CHLORIDE SERPL-SCNC: 102 MMOL/L (ref 94–109)
CHOLEST SERPL-MCNC: 156 MG/DL
CO2 SERPL-SCNC: 28 MMOL/L (ref 20–32)
CREAT SERPL-MCNC: 0.51 MG/DL (ref 0.52–1.04)
GFR SERPL CREATININE-BSD FRML MDRD: >90 ML/MIN/{1.73_M2}
GLUCOSE SERPL-MCNC: 94 MG/DL (ref 70–99)
HDLC SERPL-MCNC: 55 MG/DL
LDLC SERPL CALC-MCNC: 72 MG/DL
NONHDLC SERPL-MCNC: 101 MG/DL
POTASSIUM SERPL-SCNC: 3.8 MMOL/L (ref 3.4–5.3)
SODIUM SERPL-SCNC: 136 MMOL/L (ref 133–144)
TRIGL SERPL-MCNC: 143 MG/DL

## 2021-07-06 PROCEDURE — 99214 OFFICE O/P EST MOD 30 MIN: CPT | Performed by: FAMILY MEDICINE

## 2021-07-06 PROCEDURE — 80061 LIPID PANEL: CPT | Performed by: FAMILY MEDICINE

## 2021-07-06 PROCEDURE — 36415 COLL VENOUS BLD VENIPUNCTURE: CPT | Performed by: FAMILY MEDICINE

## 2021-07-06 PROCEDURE — 80048 BASIC METABOLIC PNL TOTAL CA: CPT | Performed by: FAMILY MEDICINE

## 2021-07-06 RX ORDER — LOSARTAN POTASSIUM 50 MG/1
50 TABLET ORAL DAILY
Qty: 90 TABLET | Refills: 3 | Status: SHIPPED | OUTPATIENT
Start: 2021-07-06 | End: 2021-10-04

## 2021-07-06 RX ORDER — HYDROCHLOROTHIAZIDE 25 MG/1
25 TABLET ORAL DAILY
Qty: 90 TABLET | Refills: 3 | Status: SHIPPED | OUTPATIENT
Start: 2021-07-06 | End: 2022-06-19

## 2021-07-06 RX ORDER — FERROUS SULFATE 325(65) MG
TABLET ORAL
Qty: 90 TABLET | Refills: 3 | Status: CANCELLED | OUTPATIENT
Start: 2021-07-06

## 2021-07-06 RX ORDER — SIMVASTATIN 40 MG
40 TABLET ORAL DAILY
Qty: 90 TABLET | Refills: 3 | Status: SHIPPED | OUTPATIENT
Start: 2021-07-06 | End: 2022-07-16

## 2021-07-06 RX ORDER — HYDROCHLOROTHIAZIDE 12.5 MG/1
TABLET ORAL
Qty: 180 TABLET | Refills: 1 | Status: CANCELLED | OUTPATIENT
Start: 2021-07-06

## 2021-07-06 ASSESSMENT — MIFFLIN-ST. JEOR: SCORE: 1254.98

## 2021-07-06 NOTE — PROGRESS NOTES
"    Assessment & Plan     Hypertension goal BP (blood pressure) < 140/90  -Well controlled with medications without side effects.   - BASIC METABOLIC PANEL  - losartan (COZAAR) 50 MG tablet; Take 1 tablet (50 mg) by mouth daily  - hydrochlorothiazide (HYDRODIURIL) 25 MG tablet; Take 1 tablet (25 mg) by mouth daily    Hyperlipidemia with target LDL less than 130  -Well controlled with medications without side effects.   - Lipid panel reflex to direct LDL Non-fasting  - simvastatin (ZOCOR) 40 MG tablet; Take 1 tablet (40 mg) by mouth daily    Venous (peripheral) insufficiency  -Lower extremity elevation, low-salt diet, compression stockings, and call or return to clinic as needed if these symptoms worsen or fail to improve as anticipated.     Malignant neoplasm of left female breast, unspecified estrogen receptor status, unspecified site of breast (H)  -on curative adjuvant therapy under care of oncology     History of iron deficiency  -recurrent; prior GI studies; on iron supplement indefinitely       Ordering of each unique test  Prescription drug management         BMI:   Estimated body mass index is 28.39 kg/m  as calculated from the following:    Height as of this encounter: 1.638 m (5' 4.5\").    Weight as of this encounter: 76.2 kg (168 lb).   Weight management plan: Discussed healthy diet and exercise guidelines    See Patient Instructions    Return in about 2 months (around 9/6/2021) for Wellness visit, mammogram.    Melanie Patel MD  [facility] [place] Hodges is a 76 year old who presents for the following health issues     HPI     Hyperlipidemia Follow-Up      Are you regularly taking any medication or supplement to lower your cholesterol?   Yes- Simvastatin    Are you having muscle aches or other side effects that you think could be caused by your cholesterol lowering medication?  No    Hypertension Follow-up      Do you check your blood pressure regularly outside of the " "clinic? No     Are you following a low salt diet? Yes    Are your blood pressures ever more than 140 on the top number (systolic) OR more   than 90 on the bottom number (diastolic), for example 140/90?       How many servings of fruits and vegetables do you eat daily?  2-3    On average, how many sweetened beverages do you drink each day (Examples: soda, juice, sweet tea, etc.  Do NOT count diet or artificially sweetened beverages)?   1    How many days per week do you exercise enough to make your heart beat faster?     How many minutes a day do you exercise enough to make your heart beat faster?     How many days per week do you miss taking your medication? 0        Review of Systems   CONSTITUTIONAL: NEGATIVE for fever, chills, change in weight  INTEGUMENTARY/SKIN: spider veins   RESP: NEGATIVE for significant cough or SOB  CV: POSITIVE for lower extremity edema and NEGATIVE for chest pain/chest pressure, dyspnea on exertion and palpitations  MUSCULOSKELETAL: NEGATIVE for significant arthralgias or myalgia  HEME: NEGATIVE for bleeding problems      Objective    /71   Pulse 73   Temp 97.3  F (36.3  C) (Oral)   Ht 1.638 m (5' 4.5\")   Wt 76.2 kg (168 lb)   SpO2 97%   Breastfeeding No   BMI 28.39 kg/m    Body mass index is 28.39 kg/m .  Physical Exam   GENERAL: alert and no distress  EYES: Eyes grossly normal to inspection, PERRL and conjunctivae and sclerae normal  NECK: no adenopathy, no asymmetry, masses, or scars and thyroid normal to palpation  RESP: lungs clear to auscultation - no rales, rhonchi or wheezes  CV: regular rates and rhythm, normal S1 S2, no S3 or S4, no murmur, click or rub and woody bipedal edema   MS: extremities normal- no gross deformities noted  SKIN: no suspicious lesions or rashes and scattered spider veins and varicosities of lower extremities   PSYCH: mentation appears normal, affect normal/bright    Orders Only on 05/19/2020   Component Date Value Ref Range Status     " Cholesterol 05/19/2020 159  <200 mg/dL Final     Triglycerides 05/19/2020 134  <150 mg/dL Final    Fasting specimen     HDL Cholesterol 05/19/2020 60  >49 mg/dL Final     LDL Cholesterol Calculated 05/19/2020 72  <100 mg/dL Final    Desirable:       <100 mg/dl     Non HDL Cholesterol 05/19/2020 99  <130 mg/dL Final     Sodium 05/19/2020 135  133 - 144 mmol/L Final     Potassium 05/19/2020 3.7  3.4 - 5.3 mmol/L Final     Chloride 05/19/2020 101  94 - 109 mmol/L Final     Carbon Dioxide 05/19/2020 29  20 - 32 mmol/L Final     Anion Gap 05/19/2020 5  3 - 14 mmol/L Final     Glucose 05/19/2020 99  70 - 99 mg/dL Final    Fasting specimen     Urea Nitrogen 05/19/2020 11  7 - 30 mg/dL Final     Creatinine 05/19/2020 0.51* 0.52 - 1.04 mg/dL Final     GFR Estimate 05/19/2020 >90  >60 mL/min/[1.73_m2] Final    Comment: Non  GFR Calc  Starting 12/18/2018, serum creatinine based estimated GFR (eGFR) will be   calculated using the Chronic Kidney Disease Epidemiology Collaboration   (CKD-EPI) equation.       GFR Estimate If Black 05/19/2020 >90  >60 mL/min/[1.73_m2] Final    Comment:  GFR Calc  Starting 12/18/2018, serum creatinine based estimated GFR (eGFR) will be   calculated using the Chronic Kidney Disease Epidemiology Collaboration   (CKD-EPI) equation.       Calcium 05/19/2020 9.9  8.5 - 10.1 mg/dL Final       \plain

## 2021-07-06 NOTE — PATIENT INSTRUCTIONS
Patient Education     Understanding Leg Vein Problems  Leg veins carry blood from your feet and legs back to your heart. If a vein is damaged, it may cause problems in your legs.     If heredity, an injury, or a blood clot weakens a vein, the wall near the valve starts to sag. A valve keeps blood moving through the vein towards the heart. The valve may no longer close fully, allowing blood to move backward.       Once a vein is damaged, blood pressing against the sagging wall may cause the vein to look like a twisted rope. Eventually, the valve can t close. Blood may start to pool or clot in the vein.       A valve that doesn t close allows blood to move backwards and sit (pool) in the vein.       Blood that is not moving may form clots. Over time, the clot may grow big enough to block the vein.      A damaged vein A  ropy  vein Pooling blood Clotting blood   The problems that may happen from damage to the veins include:     Varicose veins. This a swollen, twisted vein located close to the skin.    Deep vein thrombosis (DVT).  This is a blood clot in one of the deep veins, usually of the legs. The clot can separate from the vein and travel to the lungs (pulmonary embolism or PE). In the lungs, the clot can cut off the flow of blood. These two conditions together are called venous thromboembolism (VTE).    Chronic venous insufficiency.  This is a long-term problem with the veins not working well.  Your healthcare provider can give you more information on these conditions and how to prevent and treat them.  When to call your healthcare provider  Call your healthcare provider right away if you have pain, swelling, or redness in one of your legs. These are the symptoms of a blood clot.  Call 911  Call 911 if you have:    Chest pain    Trouble breathing    Fast heartbeat    Sweating    Coughing (may cough up blood)    Fainting  These are the symptoms of a blood clot that has traveled to the lungs. This is a medical  emergency and may cause death.   [employer] last reviewed this educational content on 12/1/2019 2000-2021 [employer]. All rights reserved. This information is not intended as a substitute for professional medical care. Always follow your healthcare professional's instructions.

## 2021-07-07 ENCOUNTER — TELEPHONE (OUTPATIENT)
Dept: FAMILY MEDICINE | Facility: CLINIC | Age: 76
End: 2021-07-07

## 2021-07-07 NOTE — TELEPHONE ENCOUNTER
Pt called back and message relayed about lab results. Writer assisted pt with scheduling lab appointment for 1 month.    Cayla BUSCH RN, BSN  [facility]

## 2021-07-07 NOTE — TELEPHONE ENCOUNTER
Called patient. Left voice message to return call at [phone].      Please call patient:     Your cholesterol, blood glucose, and kidney tests are normal. Your calcium is high again. Return for lab-only recheck of your test at your convenience in a month. Call our appointment line at [phone].     Melanie Patel MD

## 2021-07-07 NOTE — RESULT ENCOUNTER NOTE
Please call patient:    Your cholesterol, blood glucose, and kidney tests are normal. Your calcium is high again. Return for lab-only recheck of your test at your convenience in a month. Call our appointment line at [phone].     Melanie Patel MD

## 2021-07-21 ENCOUNTER — TELEPHONE (OUTPATIENT)
Dept: FAMILY MEDICINE | Facility: CLINIC | Age: 76
End: 2021-07-21

## 2021-07-21 NOTE — TELEPHONE ENCOUNTER
Patient reports that she got the [employer] vaccine in March.    Patient is concerned about the new and current Delta variant of COVID virus.    She states that some friends recommended that patient may need a booster.    Patient was informed per the CDC there is not booster vaccine available at this time.    Per the CDC:   New variants of the virus that causes COVID-19 are spreading in the [place] and in other parts of the world. Current data suggest that COVID-19 vaccines authorized for use in the [place] offer protection against most variants currently spreading in the [place]. However, some variants might cause illness in some people even after they are fully vaccinated.

## 2021-08-02 ENCOUNTER — LAB (OUTPATIENT)
Dept: LAB | Facility: CLINIC | Age: 76
End: 2021-08-02
Payer: MEDICARE

## 2021-08-02 DIAGNOSIS — E83.52 HYPERCALCEMIA: ICD-10-CM

## 2021-08-02 LAB
CA-I BLD-MCNC: 5.1 MG/DL (ref 4.4–5.2)
DEPRECATED CALCIDIOL+CALCIFEROL SERPL-MC: 38 UG/L (ref 20–75)

## 2021-08-02 PROCEDURE — 82330 ASSAY OF CALCIUM: CPT

## 2021-08-02 PROCEDURE — 82306 VITAMIN D 25 HYDROXY: CPT

## 2021-08-02 PROCEDURE — 36415 COLL VENOUS BLD VENIPUNCTURE: CPT

## 2021-08-02 NOTE — LETTER
August 3, 2021      Tracie Feliciano  [address]  [place] [facility] [place] [address]        Dear Ms.Andreebjorn,    We are writing to inform you of your test results.    Your results are normal.         Resulted Orders   Vitamin D Deficiency   Result Value Ref Range    Vitamin D, Total (25-Hydroxy) 38 20 - 75 ug/L    Narrative    Season, race, dietary intake, and treatment affect the concentration of 25-hydroxy-Vitamin D. Values may decrease during winter months and increase during summer months. Values 20-29 ug/L may indicate Vitamin D insufficiency and values <20 ug/L may indicate Vitamin D deficiency.    Vitamin D determination is routinely performed by an immunoassay specific for 25 hydroxyvitamin D3.  If an individual is on vitamin D2(ergocalciferol) supplementation, please specify 25 OH vitamin D2 and D3 level determination by LCMSMS test VITD23.     Ionized calcium, whole blood   Result Value Ref Range    Calcium Ionized Whole Blood 5.1 4.4 - 5.2 mg/dL       If you have any questions or concerns, please call the clinic at the number listed above.       Sincerely,      Melanie Patel MD/radha

## 2021-09-14 ENCOUNTER — OFFICE VISIT (OUTPATIENT)
Dept: FAMILY MEDICINE | Facility: CLINIC | Age: 76
End: 2021-09-14
Payer: MEDICARE

## 2021-09-14 VITALS
HEART RATE: 74 BPM | TEMPERATURE: 97.9 F | WEIGHT: 163 LBS | SYSTOLIC BLOOD PRESSURE: 139 MMHG | DIASTOLIC BLOOD PRESSURE: 77 MMHG | OXYGEN SATURATION: 97 % | HEIGHT: 65 IN | BODY MASS INDEX: 27.16 KG/M2

## 2021-09-14 DIAGNOSIS — N39.41 URGE INCONTINENCE OF URINE: ICD-10-CM

## 2021-09-14 DIAGNOSIS — Z00.00 ENCOUNTER FOR MEDICARE ANNUAL WELLNESS EXAM: Primary | ICD-10-CM

## 2021-09-14 DIAGNOSIS — Z78.0 ASYMPTOMATIC POSTMENOPAUSAL STATUS: ICD-10-CM

## 2021-09-14 DIAGNOSIS — I87.2 VENOUS (PERIPHERAL) INSUFFICIENCY: ICD-10-CM

## 2021-09-14 DIAGNOSIS — L98.9 SKIN LESION: ICD-10-CM

## 2021-09-14 DIAGNOSIS — Z86.39 HISTORY OF IRON DEFICIENCY: ICD-10-CM

## 2021-09-14 DIAGNOSIS — M85.80 OSTEOPENIA, UNSPECIFIED LOCATION: ICD-10-CM

## 2021-09-14 DIAGNOSIS — I10 HYPERTENSION GOAL BP (BLOOD PRESSURE) < 140/90: ICD-10-CM

## 2021-09-14 DIAGNOSIS — E78.5 HYPERLIPIDEMIA WITH TARGET LDL LESS THAN 130: ICD-10-CM

## 2021-09-14 DIAGNOSIS — L30.9 ECZEMA, UNSPECIFIED TYPE: ICD-10-CM

## 2021-09-14 PROCEDURE — 90715 TDAP VACCINE 7 YRS/> IM: CPT | Performed by: FAMILY MEDICINE

## 2021-09-14 PROCEDURE — 90471 IMMUNIZATION ADMIN: CPT | Performed by: FAMILY MEDICINE

## 2021-09-14 PROCEDURE — G0439 PPPS, SUBSEQ VISIT: HCPCS | Performed by: FAMILY MEDICINE

## 2021-09-14 PROCEDURE — 99213 OFFICE O/P EST LOW 20 MIN: CPT | Mod: 25 | Performed by: FAMILY MEDICINE

## 2021-09-14 PROCEDURE — G0008 ADMIN INFLUENZA VIRUS VAC: HCPCS | Mod: 59 | Performed by: FAMILY MEDICINE

## 2021-09-14 PROCEDURE — 90662 IIV NO PRSV INCREASED AG IM: CPT | Performed by: FAMILY MEDICINE

## 2021-09-14 RX ORDER — TRIAMCINOLONE ACETONIDE 1 MG/G
CREAM TOPICAL
Qty: 30 G | Refills: 0 | Status: SHIPPED | OUTPATIENT
Start: 2021-09-14 | End: 2022-02-15

## 2021-09-14 ASSESSMENT — ACTIVITIES OF DAILY LIVING (ADL): CURRENT_FUNCTION: NO ASSISTANCE NEEDED

## 2021-09-14 ASSESSMENT — MIFFLIN-ST. JEOR: SCORE: 1232.3

## 2021-09-14 NOTE — PROGRESS NOTES
"SUBJECTIVE:   Tracie Feliciano is a 76 year old female  who presents for Preventive Visit.    Patient has been advised of split billing requirements and indicates understanding: Yes   Are you in the first 12 months of your Medicare coverage?  No    She has a persistent scaly skin lesion on her right thigh and a red scaly rash on her left lower extremity. Hypertension well controlled on current medications without side effects, chest pain, or dyspnea. Hypercholesterolemia well controlled with current treatment plan without side effects. She has chronic lower extremity edema, urine incontinence, and hx of recurrent iron deficiency.     Healthy Habits:     In general, how would you rate your overall health?  Very good    Frequency of exercise:  2-3 days/week    Duration of exercise:  Greater than 60 minutes    Do you usually eat at least 4 servings of fruit and vegetables a day, include whole grains    & fiber and avoid regularly eating high fat or \"junk\" foods?  Yes    Taking medications regularly:  Yes    Barriers to taking medications:  None    Medication side effects:  None    Ability to successfully perform activities of daily living:  No assistance needed    Home Safety:  No safety concerns identified    Hearing Impairment:  No hearing concerns    In the past 6 months, have you been bothered by leaking of urine? Yes    In general, how would you rate your overall mental or emotional health?  Very good      PHQ-2 Total Score: 0    Additional concerns today:  Yes    Do you feel safe in your environment? Yes    Have you ever done Advance Care Planning? (For example, a Health Directive, POLST, or a discussion with a medical provider or your loved ones about your wishes): No, advance care planning information given to patient to review.  Advanced care planning was discussed at today's visit.       Fall risk  Fallen 2 or more times in the past year?: No  Any fall with injury in the past year?: No    Cognitive " Screening   1) Repeat 3 items (Leader, Season, Table)    2) Clock draw: NORMAL  3) 3 item recall: Recalls 1 object   Results: NORMAL clock, 1-2 items recalled: COGNITIVE IMPAIRMENT LESS LIKELY    Mini-CogTM Copyright S Maverick. Licensed by the author for use in [facility]; reprinted with permission (alice@[facility]). All rights reserved.      Do you have sleep apnea, excessive snoring or daytime drowsiness?: no    Reviewed and updated as needed this visit by clinical staff  Tobacco  Allergies  Meds  Problems  Med Hx  Surg Hx  Fam Hx  Soc Hx          Reviewed and updated as needed this visit by Provider  Tobacco  Allergies  Meds  Problems  Med Hx  Surg Hx  Fam Hx         Social History     Tobacco Use     Smoking status: Former Smoker     Packs/day: 0.50     Years: 1.00     Pack years: 0.50     Types: Cigarettes     Quit date: 1967     Years since quittin.7     Smokeless tobacco: Never Used   Substance Use Topics     Alcohol use: Yes     Alcohol/week: 2.5 standard drinks     Types: 3 Standard drinks or equivalent per week     Comment: wine     If you drink alcohol do you typically have >3 drinks per day or >7 drinks per week? No    Alcohol Use 2021   Prescreen: >3 drinks/day or >7 drinks/week? -   Prescreen: >3 drinks/day or >7 drinks/week? No               Current providers sharing in care for this patient include:    Patient Care Team:  Melanie Patel MD as PCP - General  Melanie Patel MD as Assigned PCP    The following health maintenance items are reviewed in Epic and correct as of today:  Health Maintenance Due   Topic Date Due     MAMMO SCREENING  2021     Patient Active Problem List   Diagnosis     Allergic rhinitis     Hyperlipidemia with target LDL less than 130     Hypertension goal BP (blood pressure) < 140/90     Osteopenia     Advanced directives, counseling/discussion     Cataract     Venous (peripheral) insufficiency     Urge  incontinence of urine     Malignant neoplasm of left female breast, unspecified estrogen receptor status, unspecified site of breast (H)     Primary osteoarthritis of both hips     History of iron deficiency     Past Surgical History:   Procedure Laterality Date     C/SECTION, LOW TRANSVERSE  80     COLONOSCOPY  2012    Procedure: COLONOSCOPY;  COLONOSCOPY, SCREEN, PREVIOUS POLYP;  Surgeon: Maverick Maradiaga MD;  Location: [facility] OR     EYE SURGERY Bilateral     cataract     HYSTERECTOMY, PAP NO LONGER INDICATED  09    BSO      LUMPECTOMY BREAST Left 2017       Social History     Tobacco Use     Smoking status: Former Smoker     Packs/day: 0.50     Years: 1.00     Pack years: 0.50     Types: Cigarettes     Quit date: 1967     Years since quittin.7     Smokeless tobacco: Never Used   Substance Use Topics     Alcohol use: Yes     Alcohol/week: 2.5 standard drinks     Types: 3 Standard drinks or equivalent per week     Comment: wine     Family History   Problem Relation Age of Onset     Cancer Mother         d. pelvic     C.A.D. Mother         ?     C.A.D. Father 79        MI, d.     Alzheimer Disease Father      Gastrointestinal Disease Father         PUD     Cancer Maternal Aunt         GI?     Diabetes No family hx of          Current Outpatient Medications   Medication Sig Dispense Refill     acetaminophen (TYLENOL) 650 MG CR tablet Take 1 tablet (650 mg) by mouth every 8 hours as needed 60 tablet 1     Cholecalciferol (VITAMIN D) 2000 UNITS tablet Take 2,000 Units by mouth daily        FEROSUL 325 (65 Fe) MG tablet TAKE 1 TABLET BY MOUTH DAILY WITH BREAKFAST 90 tablet 0     hydrochlorothiazide (HYDRODIURIL) 25 MG tablet Take 1 tablet (25 mg) by mouth daily 90 tablet 3     losartan (COZAAR) 50 MG tablet Take 1 tablet (50 mg) by mouth daily 90 tablet 3     simvastatin (ZOCOR) 40 MG tablet Take 1 tablet (40 mg) by mouth daily 90 tablet 3     tamoxifen (NOLVADEX) 20 MG tablet Take 20  "mg by mouth daily       timolol (TIMOPTIC) 0.5 % ophthalmic solution PLACE 1 DROP INTO THE LEFT EYE QAM  3     triamcinolone (KENALOG) 0.1 % external cream Apply to affected area sparingly twice daily as needed 30 g 0     Allergies   Allergen Reactions     Aspirin      Reactive gastropathy     Contrast Dye      sneezing     Lisinopril Cough             Review of Systems  CONSTITUTIONAL: NEGATIVE for fever, chills, change in weight  INTEGUMENTARY/SKIN: see HPI   EYES: NEGATIVE for vision changes or irritation  ENT/MOUTH: NEGATIVE for ear, mouth and throat problems  RESP: NEGATIVE for significant cough or SOB  BREAST: NEGATIVE for masses, tenderness or discharge  CV: lower extremity edema  GI: NEGATIVE for nausea, abdominal pain, heartburn, or change in bowel habits   female: incontinence-stress  MUSCULOSKELETAL: NEGATIVE for significant arthralgias or myalgia  NEURO: NEGATIVE for weakness, dizziness or paresthesias  ENDOCRINE: NEGATIVE for temperature intolerance, skin/hair changes  HEME: NEGATIVE for bleeding problems  PSYCHIATRIC: NEGATIVE for changes in mood or affect    OBJECTIVE:   /77   Pulse 74   Temp 97.9  F (36.6  C) (Oral)   Ht 1.638 m (5' 4.5\")   Wt 73.9 kg (163 lb)   SpO2 97%   Breastfeeding No   BMI 27.55 kg/m   Estimated body mass index is 27.55 kg/m  as calculated from the following:    Height as of this encounter: 1.638 m (5' 4.5\").    Weight as of this encounter: 73.9 kg (163 lb).  Physical Exam  GENERAL: healthy, alert and no distress  EYES: Eyes grossly normal to inspection, PERRL and conjunctivae and sclerae normal  HENT: ear canals and TM's normal, nose and mouth without ulcers or lesions  NECK: no adenopathy, no asymmetry, masses, or scars and thyroid normal to palpation  RESP: lungs clear to auscultation - no rales, rhonchi or wheezes  CV: regular rates and rhythm, normal S1 S2, no S3 or S4 and no murmur, click or rub  ABDOMEN: soft, nontender, no hepatosplenomegaly, no masses " and bowel sounds normal  MS: woody bipedal edema; normal gait   SKIN: horn like papule on right medial thigh; red scaly round rash on left anterior shin; scattered spider veins on lower extremities   PSYCH: mentation appears normal, affect normal/bright    Diagnostic Test Results:  Labs reviewed in Epic    ASSESSMENT / PLAN:   (Z00.00) Encounter for Medicare annual wellness exam  (primary encounter diagnosis)    (L98.9) Skin lesion - left thigh   Comment: Differential diagnoses would include: SCC   Plan: Adult Dermatology Referral           (L30.9) Eczema, unspecified type  Plan: Adult Dermatology Referral, triamcinolone         (KENALOG) 0.1 % external cream          (I10) Hypertension goal BP (blood pressure) < 140/90  Comment: Well controlled with medications without side effects.     (E78.5) Hyperlipidemia with target LDL less than 130  Comment: Well controlled with medications without side effects.     (I87.2) Venous (peripheral) insufficiency  Plan: Counseled to make better food choices, exercise as tolerated, and lose weight. Offered referral.     (N39.41) Urge incontinence of urine  Plan: offered medication trial and/or referral     (Z86.39) History of iron deficiency  Plan: take iron indefinitely     (M85.80) Osteopenia, unspecified location  Plan: DX Hip/Pelvis/Spine            Patient has been advised of split billing requirements and indicates understanding: Yes  COUNSELING:  Reviewed preventive health counseling, as reflected in patient instructions  Special attention given to:       Regular exercise       Healthy diet/nutrition       Bladder control       Osteoporosis prevention/bone health       Colon cancer screening       Hepatitis C screening       The 10-year ASCVD risk score (Justyna CLEARY Jr., et al., 2013) is: 21.4%    Values used to calculate the score:      Age: 76 years      Sex: Female      Is Non- : No      Diabetic: No      Tobacco smoker: No      Systolic Blood  "Pressure: 139 mmHg      Is BP treated: Yes      HDL Cholesterol: 55 mg/dL      Total Cholesterol: 156 mg/dL    Estimated body mass index is 27.55 kg/m  as calculated from the following:    Height as of this encounter: 1.638 m (5' 4.5\").    Weight as of this encounter: 73.9 kg (163 lb).    Weight management plan: Discussed healthy diet and exercise guidelines    She reports that she quit smoking about 54 years ago. Her smoking use included cigarettes. She has a 0.50 pack-year smoking history. She has never used smokeless tobacco.      Appropriate preventive services were discussed with this patient, including applicable screening as appropriate for cardiovascular disease, diabetes, osteopenia/osteoporosis, and glaucoma.  As appropriate for age/gender, discussed screening for colorectal cancer, prostate cancer, breast cancer, and cervical cancer. Checklist reviewing preventive services available has been given to the patient.    Reviewed patients plan of care and provided an AVS. The Basic Care Plan (routine screening as documented in Health Maintenance) for Tracie meets the Care Plan requirement. This Care Plan has been established and reviewed with the Patient.    Counseling Resources:  ATP IV Guidelines  Pooled Cohorts Equation Calculator  Breast Cancer Risk Calculator  Breast Cancer: Medication to Reduce Risk  FRAX Risk Assessment  ICSI Preventive Guidelines  Dietary Guidelines for Americans, 2010  carpooling.com's MyPlate  ASA Prophylaxis  Lung CA Screening    Melanie Patel MD  [facility]    Identified Health Risks:    Information on urinary incontinence and treatment options given to patient.  "

## 2021-09-28 ENCOUNTER — ANCILLARY PROCEDURE (OUTPATIENT)
Dept: MAMMOGRAPHY | Facility: CLINIC | Age: 76
End: 2021-09-28
Attending: FAMILY MEDICINE
Payer: MEDICARE

## 2021-09-28 DIAGNOSIS — Z12.31 VISIT FOR SCREENING MAMMOGRAM: ICD-10-CM

## 2021-09-28 PROCEDURE — 77063 BREAST TOMOSYNTHESIS BI: CPT | Mod: TC | Performed by: RADIOLOGY

## 2021-09-28 PROCEDURE — 77067 SCR MAMMO BI INCL CAD: CPT | Mod: TC | Performed by: RADIOLOGY

## 2021-10-02 DIAGNOSIS — I10 HYPERTENSION GOAL BP (BLOOD PRESSURE) < 140/90: ICD-10-CM

## 2021-10-04 RX ORDER — LOSARTAN POTASSIUM 50 MG/1
TABLET ORAL
Qty: 90 TABLET | Refills: 2 | Status: SHIPPED | OUTPATIENT
Start: 2021-10-04 | End: 2022-09-21

## 2021-11-16 ENCOUNTER — TRANSFERRED RECORDS (OUTPATIENT)
Dept: HEALTH INFORMATION MANAGEMENT | Facility: CLINIC | Age: 76
End: 2021-11-16
Payer: MEDICARE

## 2021-12-06 ENCOUNTER — ANCILLARY PROCEDURE (OUTPATIENT)
Dept: BONE DENSITY | Facility: CLINIC | Age: 76
End: 2021-12-06
Attending: FAMILY MEDICINE
Payer: MEDICARE

## 2021-12-06 DIAGNOSIS — M85.80 OSTEOPENIA, UNSPECIFIED LOCATION: ICD-10-CM

## 2021-12-06 DIAGNOSIS — Z78.0 ASYMPTOMATIC POSTMENOPAUSAL STATUS: ICD-10-CM

## 2021-12-06 PROCEDURE — 77080 DXA BONE DENSITY AXIAL: CPT | Performed by: INTERNAL MEDICINE

## 2021-12-06 NOTE — LETTER
2021    Tracie JENKINS Jodie  [address]  [place] [address]          Dear ,    We are writing to inform you of your test results.    Here are your bone scan results. You do not have osteoporosis, but I'd like to follow your bone strength closely with a DEXA scan in 2 years.       Resulted Orders   DX Hip/Pelvis/Spine    Narrative    BONE DENSITOMETRY  [facility]  [address] [place] [address]  2021      PATIENT: Tracie Feliciano  CHART: [id number]   :  1945  AGE:  76 year old  SEX:  female   REFERRING PROVIDER:  Melanie Patel MD     PROCEDURE:  Bone density scanning was performed using DXA technology of   the lumbar spine and hip.  Scanning was performed on a Lunar Prodigy   scanner.  Reporting is completed in the form of a T-score.  The T-score   represents the standard deviation from peak bone mass based on a young   healthy adult.     REFERENCE T-SCORES:       Normal                -1.0 and greater                                 Osteopenia         Between -1.0 and -2.5                                             Osteoporosis     -2.5 and less                                       RISK FACTORS:  Post-menopausal, Height loss of 1.5 inches, follow up   osteopenia    CURRENT TREATMENT:  Vitamin D     FINDINGS:               Lumbar Spine (L1-L2)      T-score:  -1.4, marked degenerative   changes present at L3,4, so only L1,2 are evaluated.                Left Femoral Neck            T-score:  -2.0               Right Femoral Neck          T-score:  -1.9                          Lumbar (L1-L2) BMD: 0.996  Previous: 1.057               Total Hip Mean BMD: 0.796  Previous: 0.865     Comparison is made to another DXA performed on the same Lunar Prodigy    machine on 2017.      IMPRESSION  Osteopenia.    There has been significant decrease in bone density of the lumbar spine.   There has been significant decrease in bone density of the hip(s).  "T    Recommendations include ensuring adequate Calcium and Vitamin D.    The current NOF Guidelines recommend treatment for patients with prior hip   or vertebral fracture, T-score -2.5 or below, or 10 year risk of any major   osteoporotic fracture >20% or 10 year risk of hip fracture >3%, as   calculated using the FRAX calculator (www.shef.ac.uk/FRAX or you can   google \"FRAX\").      This patient's risks based on available information, with the use of FRAX,   are 13 % for major osteoporotic fracture and 3.1 % for hip fracture.   Based on these guidelines, treatment (in addition to calcium and vitamin   D) is recommended for this patient, after ruling out other causes of   osteoporosis.  This is meant as an aid to clinical decision making; one must still use   clinical judgement.      Follow up can be considered in 2 years.   ___________________  Ruth Pickering M.D.  Electronically signed          If you have any questions or concerns, please call the clinic at the number listed above.       Sincerely,    Melanie Patel MD          "

## 2021-12-09 NOTE — RESULT ENCOUNTER NOTE
Mail letter:    Here are your bone scan results. You do not have osteoporosis, but I'd like to follow your bone strength closely with a DEXA scan in 2 years.     Melanie Patel MD

## 2022-02-15 ENCOUNTER — ANCILLARY PROCEDURE (OUTPATIENT)
Dept: GENERAL RADIOLOGY | Facility: CLINIC | Age: 77
End: 2022-02-15
Attending: FAMILY MEDICINE
Payer: MEDICARE

## 2022-02-15 ENCOUNTER — OFFICE VISIT (OUTPATIENT)
Dept: FAMILY MEDICINE | Facility: CLINIC | Age: 77
End: 2022-02-15
Payer: MEDICARE

## 2022-02-15 VITALS
HEART RATE: 88 BPM | HEIGHT: 65 IN | SYSTOLIC BLOOD PRESSURE: 139 MMHG | WEIGHT: 162.5 LBS | BODY MASS INDEX: 27.07 KG/M2 | DIASTOLIC BLOOD PRESSURE: 90 MMHG | TEMPERATURE: 98 F | OXYGEN SATURATION: 97 %

## 2022-02-15 DIAGNOSIS — M16.0 PRIMARY OSTEOARTHRITIS OF BOTH HIPS: ICD-10-CM

## 2022-02-15 DIAGNOSIS — M54.41 ACUTE RIGHT-SIDED LOW BACK PAIN WITH RIGHT-SIDED SCIATICA: ICD-10-CM

## 2022-02-15 DIAGNOSIS — I10 HYPERTENSION GOAL BP (BLOOD PRESSURE) < 140/90: ICD-10-CM

## 2022-02-15 DIAGNOSIS — C50.912 MALIGNANT NEOPLASM OF LEFT FEMALE BREAST, UNSPECIFIED ESTROGEN RECEPTOR STATUS, UNSPECIFIED SITE OF BREAST (H): ICD-10-CM

## 2022-02-15 DIAGNOSIS — M54.41 ACUTE RIGHT-SIDED LOW BACK PAIN WITH RIGHT-SIDED SCIATICA: Primary | ICD-10-CM

## 2022-02-15 PROCEDURE — 99214 OFFICE O/P EST MOD 30 MIN: CPT | Performed by: FAMILY MEDICINE

## 2022-02-15 PROCEDURE — 73502 X-RAY EXAM HIP UNI 2-3 VIEWS: CPT | Mod: RT | Performed by: RADIOLOGY

## 2022-02-15 PROCEDURE — 72100 X-RAY EXAM L-S SPINE 2/3 VWS: CPT | Performed by: RADIOLOGY

## 2022-02-15 RX ORDER — CELECOXIB 200 MG/1
200 CAPSULE ORAL DAILY
Qty: 30 CAPSULE | Refills: 1 | Status: SHIPPED | OUTPATIENT
Start: 2022-02-15 | End: 2022-02-17

## 2022-02-15 ASSESSMENT — PAIN SCALES - GENERAL: PAINLEVEL: WORST PAIN (10)

## 2022-02-15 ASSESSMENT — MIFFLIN-ST. JEOR: SCORE: 1230.04

## 2022-02-15 NOTE — PROGRESS NOTES
Assessment & Plan     (M54.41) Acute right-sided low back pain with right-sided sciatica  (primary encounter diagnosis)  Plan: celecoxib (CELEBREX) 200 MG capsule, XR Lumbar         Spine 2/3 Views, [facility] PT and Hand Referral        Discussed risks and benefits of this medication. Over the counter pain medication as needed, ice or heat as she finds helpful, avoidance of aggravating activities, and return for persistence for consideration of MRI imaging.    (M16.0) Primary osteoarthritis of both hips  Plan: celecoxib (CELEBREX) 200 MG capsule, XR Hip         Right 2-3 Views, [facility] PT and Hand Referral          (I10) Hypertension goal BP (blood pressure) < 140/90  Comment: elevated blood pressure reading today   Plan: follow-up per AVS     (C50.912) Malignant neoplasm of left female breast, unspecified estrogen receptor status, unspecified site of breast (H)  Comment: on tamoxifen prescribed by specialist; no evidence of disease   Plan: follow-up per HM              See Patient Instructions    Return in about 7 months (around 9/15/2022), or if symptoms worsen or fail to improve, for Wellness visit, blood pressure.    Melanie Patel MD  [facility] EDGAR Hodges is a 76 year old who presents for the following health issues  accompanied by her spouse.    HPI     Pain History:  When did you first notice your pain? - 1 to 6 weeks   Have you seen any provider previously for this issue? No  How has your pain affected your ability to work? Not applicable  Where in your body do you have pain? Musculoskeletal problem/pain  Onset/Duration: 2 weeks  Description  Location: Right buttocks and right leg -   Joint Swelling: no  Redness: no  Pain: YES  Warmth: no  Intensity:  10/10  Progression of Symptoms:  worsening  Accompanying signs and symptoms:   Fevers: no  Numbness/tingling/weakness: no  History  Trauma to the area: no  Recent illness:  no  Previous similar problem: YES- 1 year ago  Previous  "evaluation:  YES- 1 year ago x-ray  Precipitating or alleviating factors:  Aggravating factors include: sitting and climbing stairs  Therapies tried and outcome: acetaminophen          Review of Systems   CONSTITUTIONAL: NEGATIVE for fever, chills, change in weight  INTEGUMENTARY/SKIN: NEGATIVE for worrisome rashes, moles or lesions  ENT/MOUTH: NEGATIVE for ear, mouth and throat problems  RESP: NEGATIVE for significant cough or SOB  MUSCULOSKELETAL:back pain and radicular pain   NEURO: NEGATIVE for weakness, dizziness or paresthesias      Objective    BP (!) 139/90 (BP Location: Left arm, Patient Position: Sitting, Cuff Size: Adult Regular)   Pulse 88   Temp 98  F (36.7  C) (Oral)   Ht 1.638 m (5' 4.5\")   Wt 73.7 kg (162 lb 8 oz)   SpO2 97%   BMI 27.46 kg/m    Body mass index is 27.46 kg/m .  Physical Exam   GENERAL: healthy, alert and no distress  EYES: Eyes grossly normal to inspection, PERRL and conjunctivae and sclerae normal  NECK: no adenopathy, no asymmetry, masses, or scars and thyroid normal to palpation  RESP: lungs clear to auscultation - no rales, rhonchi or wheezes  CV: regular rate and rhythm, normal S1 S2, no S3 or S4, no murmur, click or rub, woody bipedal edema   MS: no gross musculoskeletal defects noted   NEURO: weakness of right lower extremity, sensory exam grossly normal and mentation intact  PSYCH: mentation appears normal, affect normal/bright    Lab on 08/02/2021   Component Date Value Ref Range Status     Vitamin D, Total (25-Hydroxy) 08/02/2021 38  20 - 75 ug/L Final     Calcium Ionized 08/02/2021 5.1  4.4 - 5.2 mg/dL Final               "

## 2022-02-15 NOTE — PATIENT INSTRUCTIONS
Patient Education     How Your Back Works  A healthy back lets you bend and stretch without pain. The spine has 3 natural curves. These keep your body balanced. Strong, flexible muscles support your spine. Soft, cushioning disks separate the hard bones of your spine. The disks let your spine bend and move.    The parts of the spine    The vertebrae are the 24 bones that make up the spine.    The spinous process is the part of each vertebra you can feel through your skin.    Each of these bones has a central hole that runs top to bottom. Together these holes form a tunnel called the spinal canal.    The lamina of each vertebra forms the back of the spinal canal.    Running through the canal are nerves. They are attached in a bundle called the spinal cord for most of the canal.    A foramen is a small opening where a spinal nerve root leaves the spinal canal.    Disks serve as cushions between vertebrae. A disk s soft center absorbs shock during movement.     Two vertebrae and a disk     The supporting muscles  Strong, flexible muscles help maintain your 3 natural curves. They hold your spine in correct alignment. This helps support your upper body. Strong core muscles help take the strain off your back. These include the stomach, buttock, and thigh muscles.  [employer] last reviewed this educational content on 1/1/2018 2000-2021 [employer]. All rights reserved. This information is not intended as a substitute for professional medical care. Always follow your healthcare professional's instructions.

## 2022-02-16 PROBLEM — M51.369 DDD (DEGENERATIVE DISC DISEASE), LUMBAR: Status: ACTIVE | Noted: 2022-02-16

## 2022-02-16 NOTE — RESULT ENCOUNTER NOTE
Please call patient:  Your x-rays show moderate right hip arthritis and some mild back arthritis. Continue the medication and proceed with plans for physical therapy. If your symptoms are not improving, I do recommend consult with one of our sports medicine specialists to consider a right hip injection. I've made a referral for you.    If your symptoms persist or are severe, let's also move ahead with a back MRI to make sure there is no cancer or worrisome findings there. Call the imaging center at [phone] or  [phone] to schedule your MRI.     Melanie Patel MD

## 2022-02-17 ENCOUNTER — TELEPHONE (OUTPATIENT)
Dept: FAMILY MEDICINE | Facility: CLINIC | Age: 77
End: 2022-02-17
Payer: MEDICARE

## 2022-02-17 RX ORDER — CELECOXIB 200 MG/1
CAPSULE ORAL
Qty: 90 CAPSULE | Refills: 0 | Status: SHIPPED | OUTPATIENT
Start: 2022-02-17 | End: 2022-03-16

## 2022-02-17 NOTE — TELEPHONE ENCOUNTER
Patient called because she received a phone call from someone trying to help her set up an appointment for an MRI. Patient wanted to make sure that Dr. Patel is recommending it.     Patient reports Celebrex is working well and patient is pleased with services provided by Dr. Patel and team. Patient also wanted to make sure that she would be receiving results in the mail.    Jaci Grande MSN, BSN, RN on 2/17/2022 at 11:13 AM  [facility]

## 2022-02-17 NOTE — TELEPHONE ENCOUNTER
"Routing refill request to provider for review/approval because:  Prescription was sent on 2/15/22 for 30 days plus one refill, pharmacy rquesting 90 day supply      Requested Prescriptions   Pending Prescriptions Disp Refills     celecoxib (CELEBREX) 200 MG capsule [Pharmacy Med Name: CELECOXIB 200MG CAPSULES] 90 capsule      Sig: TAKE 1 CAPSULE(200 MG) BY MOUTH DAILY       NSAID Medications Failed - 2/15/2022  5:13 PM        Failed - Blood pressure under 140/90 in past 12 months     BP Readings from Last 3 Encounters:   02/15/22 (!) 139/90   09/14/21 139/77   07/06/21 124/71                 Failed - Normal ALT on file in past 12 months     Recent Labs   Lab Test 05/31/16  1023   ALT 36             Failed - Normal AST on file in past 12 months     Recent Labs   Lab Test 05/31/16  1023   AST 26             Failed - Patient is age 6-64 years        Failed - Normal CBC on file in past 12 months     Recent Labs   Lab Test 07/11/19  0858 10/17/18  1037   WBC  --  6.1   RBC  --  4.08   HGB 13.5 11.6*   HCT  --  35.9   PLT  --  194                 Failed - Normal serum creatinine on file in past 12 months     Recent Labs   Lab Test 07/06/21  1157   CR 0.51*       Ok to refill medication if creatinine is low          Passed - Recent (12 mo) or future (30 days) visit within the authorizing provider's specialty     Patient has had an office visit with the authorizing provider or a provider within the authorizing providers department within the previous 12 mos or has a future within next 30 days. See \"Patient Info\" tab in inbasket, or \"Choose Columns\" in Meds & Orders section of the refill encounter.              Passed - Medication is active on med list        Passed - No active pregnancy on record        Passed - No positive pregnancy test in past 12 months           Meredith Vu RN    "

## 2022-02-17 NOTE — TELEPHONE ENCOUNTER
I think it's safe to hold off on the MRI, particularly until after she sees Sports medicine   Melanie Patel MD

## 2022-02-17 NOTE — TELEPHONE ENCOUNTER
Patient notified of provider message as written. Patient verbalized understanding.     Nanette Grajeda RN

## 2022-02-21 ENCOUNTER — TELEPHONE (OUTPATIENT)
Dept: FAMILY MEDICINE | Facility: CLINIC | Age: 77
End: 2022-02-21
Payer: MEDICARE

## 2022-02-21 NOTE — TELEPHONE ENCOUNTER
Patient calling to see if it is ok to take Tylenol along with her Celebrex. Advised patient that Tylenol is currently on her medication list and it is safe to take in conjunction with Celebrex. Patient states that currently has 500mg tabs at home rather than 325mg. Patient states that she will try taking 500mg and increase dosage as needed. Advised patient not to exceed 4000mg of Tylenol/day.      Ene Handy RN   [facility]

## 2022-02-22 ENCOUNTER — THERAPY VISIT (OUTPATIENT)
Dept: PHYSICAL THERAPY | Facility: CLINIC | Age: 77
End: 2022-02-22
Payer: MEDICARE

## 2022-02-22 DIAGNOSIS — M16.0 PRIMARY OSTEOARTHRITIS OF BOTH HIPS: ICD-10-CM

## 2022-02-22 DIAGNOSIS — M54.41 ACUTE RIGHT-SIDED LOW BACK PAIN WITH RIGHT-SIDED SCIATICA: ICD-10-CM

## 2022-02-22 DIAGNOSIS — M54.50 LUMBAR SPINE PAIN: ICD-10-CM

## 2022-02-22 DIAGNOSIS — M25.551 HIP PAIN, RIGHT: ICD-10-CM

## 2022-02-22 PROCEDURE — 97110 THERAPEUTIC EXERCISES: CPT | Mod: GP | Performed by: PHYSICAL THERAPIST

## 2022-02-22 PROCEDURE — 97161 PT EVAL LOW COMPLEX 20 MIN: CPT | Mod: GP | Performed by: PHYSICAL THERAPIST

## 2022-02-22 ASSESSMENT — ACTIVITIES OF DAILY LIVING (ADL)
GOING_UP_1_FLIGHT_OF_STAIRS: SLIGHT DIFFICULTY
SITTING_FOR_15_MINUTES: NO DIFFICULTY AT ALL
LIGHT_TO_MODERATE_WORK: SLIGHT DIFFICULTY
TWISTING/PIVOTING_ON_INVOLVED_LEG: SLIGHT DIFFICULTY
GETTING_INTO_AND_OUT_OF_A_BATHTUB: MODERATE DIFFICULTY
ROLLING_OVER_IN_BED: MODERATE DIFFICULTY
DEEP_SQUATTING: SLIGHT DIFFICULTY
HEAVY_WORK: MODERATE DIFFICULTY
GETTING_INTO_AND_OUT_OF_AN_AVERAGE_CAR: SLIGHT DIFFICULTY
STEPPING_UP_AND_DOWN_CURBS: MODERATE DIFFICULTY
WALKING_INITIALLY: SLIGHT DIFFICULTY
PUTTING_ON_SOCKS_AND_SHOES: SLIGHT DIFFICULTY
HOS_ADL_HIGHEST_POTENTIAL_SCORE: 64
WALKING_15_MINUTES_OR_GREATER: MODERATE DIFFICULTY
HOS_ADL_SCORE(%): 59.38
HOS_ADL_ITEM_SCORE_TOTAL: 38
RECREATIONAL_ACTIVITIES: MODERATE DIFFICULTY
GOING_DOWN_1_FLIGHT_OF_STAIRS: SLIGHT DIFFICULTY
WALKING_UP_STEEP_HILLS: EXTREME DIFFICULTY
WALKING_DOWN_STEEP_HILLS: EXTREME DIFFICULTY
HOS_ADL_COUNT: 16
WALKING_APPROXIMATELY_10_MINUTES: SLIGHT DIFFICULTY

## 2022-02-22 NOTE — PROGRESS NOTES
Physical Therapy Initial Evaluation  Subjective:  The history is provided by the patient.   Therapist Generated HPI Evaluation  Problem details: Getting pain in right low back and around the back and side of the hip and down the lateral thigh to the knee. Pain started about 6 months ago and really increased about 2-3 weeks ago. No injury. No falls. Lives in a townhouse that has 3 levels. Sitting, standing, and stairs have been difficult to do..         Type of problem:  Lumbar.    This is a chronic condition.  Condition occurred with:  Insidious onset.  Where condition occurred: for unknown reasons.  Patient reports pain:  Lumbar spine right.  Pain is described as aching and is intermittent.  Pain radiates to:  Gluteals right, thigh right and knee right. Pain is the same all the time.  Since onset symptoms are gradually worsening.  Associated symptoms:  Loss of motion/stiffness, loss of strength and loss of balance. Symptoms are exacerbated by sitting and walking  and relieved by analgesics.  Special tests included:  X-ray.    Barriers include:  Stairs.    Patient Health History    Problem began: 8/22/2021.   Problem occurred: Arthritis related   Pain is reported as 9/10 on pain scale.  General health as reported by patient is good.  Pertinent medical history includes: high blood pressure and rheumatoid arthritis.   Red flags:  None as reported by patient.         Current medications:  High blood pressure medication.    Current occupation is Retired.                                       Objective:    Gait:  Reduced walking speed and stride length. Minimal heel strike and toe off.  Gait Type:  Antalgic                    Lumbar/SI Evaluation  ROM:    AROM Lumbar:   Flexion:          1 inch from floor / Repeated motions - No change in symptoms   Ext:                    10 degrees / Repeated motions - No change in symptoms    Side Bend:        Left:  Fingertips to distal thigh with mild pain in left lumbar spine     Right:  Fingertips to distal thigh  Rotation:           Left:     Right:   Side Glide:        Left:     Right:           Lumbar Myotomes:    T12-L3 (Hip Flex):  Left: 4    Right: 4-  L2-4 (Quads):  Left:  5    Right:  5  L4 (Ankle DF):  Left:  5    Right:  5  L5 (Great Toe Ext): Left: 5    Right: 5   S1 (Toe Raise):  Left: 5    Right: 5  Lumbar DTR's:    L4 (Quad):  Left:  2   Right:  2        Neural Tension/Mobility:      Left side:SLR or SLR w/DF  negative.     Right side:   SLR w/DF or SLR  negative.   Lumbar Palpation:      Tenderness not present at Left:    Erector Spinae  Tenderness present at Right: Piriformis and PSIS  Tenderness not present at Right:  Erector Spinae      Spinal Segmental Conclusions: Hypomobility of L1-L5          SI joint/Sacrum:      Provocation:  Negative SI gapping and SI compression    Left negative at:    Thigh thrust and Sacral thrust    Right negative at:  Thigh thrust and Sacral thrust                                                 General     ROS    Assessment/Plan:    Patient is a 76 year old female with lumbar and right side hip complaints.    Patient has the following significant findings with corresponding treatment plan.                Diagnosis 1:  Lumbar spine pain  Pain -  manual therapy, self management, education and home program  Decreased ROM/flexibility - manual therapy, therapeutic exercise, therapeutic activity and home program  Decreased joint mobility - manual therapy, therapeutic exercise, therapeutic activity and home program  Decreased strength - therapeutic exercise, therapeutic activities and home program  Decreased function - therapeutic activities and home program  Diagnosis 2:  Right hip pain   Pain -  manual therapy, self management, education and home program  Decreased ROM/flexibility - manual therapy, therapeutic exercise, therapeutic activity and home program  Decreased joint mobility - manual therapy, therapeutic exercise, therapeutic activity and  home program  Decreased strength - therapeutic exercise, therapeutic activities and home program  Decreased function - therapeutic activities and home program    Therapy Evaluation Codes:     Cumulative Therapy Evaluation is: Low complexity.    Previous and current functional limitations:  (See Goal Flow Sheet for this information)    Short term and Long term goals: (See Goal Flow Sheet for this information)     Communication ability:  Patient appears to be able to clearly communicate and understand verbal and written communication and follow directions correctly.  Treatment Explanation - The following has been discussed with the patient:   RX ordered/plan of care  Anticipated outcomes  Possible risks and side effects  This patient would benefit from PT intervention to resume normal activities.   Rehab potential is good.    Frequency:  1 X week, once daily  Duration:  for 8 weeks  Discharge Plan:  Achieve all LTG.  Independent in home treatment program.  Reach maximal therapeutic benefit.    Please refer to the daily flowsheet for treatment today, total treatment time and time spent performing 1:1 timed codes.

## 2022-02-22 NOTE — PROGRESS NOTES
[facility]    OUTPATIENT Physical Therapy ORTHOPEDIC EVALUATION  PLAN OF TREATMENT FOR OUTPATIENT REHABILITATION  (COMPLETE FOR INITIAL CLAIMS ONLY)  Patient's Last Name, First Name, M.I.  YOB: 1945  Tracie Feliciano    Provider s Name:  [facility]   Medical Record No.  [id number]   Start of Care Date:  02/22/22   Onset Date:   08/22/21   Type:     _X__PT   ___OT Medical Diagnosis:    Encounter Diagnoses   Name Primary?     Acute right-sided low back pain with right-sided sciatica      Primary osteoarthritis of both hips         Treatment Diagnosis:  Lumbar spine pain / Right Hip pain        Goals:     02/22/22 0500   Body Part   Goals listed below are for Lumbar and B Hips   Goal #1   Goal #1 stairs   Previous Functional Level No restrictions   Current Functional Level Ascend/descend stairs;one step at a time   Performance Level with 9/10 pain   STG Target Performance Ascend/descend stairs;one step at a time   Performance Level with 5/10 pain   Rationale to reach upper level of home safely   Due Date 03/15/22   LTG Target Performance Ascend/descend stairs;in a normal reciprocal pattern   Performance Level with 2-3/10 pain   Rationale to reach upper level of home safely   Due Date 05/03/22   Goal #2   Goal #2 ambulation   Previous Functional Level No restrictions   Current Functional Level Minutes patient can walk   Performance Level 10 with 9/10 pain   STG Target Performance Minutes patient will be able to walk   Performance Level 15 with 5/10 pain   Rationale for safe community ambulation   Due Date 03/15/22    LTG Target Performance Minutes patient will be able to  walk   Performance Level 30 with 2-3/10 pain   Rationale for safe community ambulation   Due Date 05/03/22       Therapy Frequency:  1 time per week  Predicted Duration of Therapy Intervention:  10  corky Jacome, PT                 I CERTIFY THE NEED FOR THESE SERVICES FURNISHED UNDER        THIS PLAN OF TREATMENT AND WHILE UNDER MY CARE     (Physician attestation of this document indicates review and certification of the therapy plan).                       Certification Date From:  02/22/22   Certification Date To:  04/19/22    Referring Provider:  Melanie Patel    Initial Assessment        See Epic Evaluation SOC Date: 02/22/22

## 2022-03-10 ENCOUNTER — OFFICE VISIT (OUTPATIENT)
Dept: INTERNAL MEDICINE | Facility: CLINIC | Age: 77
End: 2022-03-10
Payer: MEDICARE

## 2022-03-10 VITALS
HEART RATE: 100 BPM | OXYGEN SATURATION: 98 % | TEMPERATURE: 97.7 F | WEIGHT: 161 LBS | SYSTOLIC BLOOD PRESSURE: 128 MMHG | DIASTOLIC BLOOD PRESSURE: 64 MMHG | BODY MASS INDEX: 27.21 KG/M2

## 2022-03-10 DIAGNOSIS — M54.41 CHRONIC MIDLINE LOW BACK PAIN WITH RIGHT-SIDED SCIATICA: Primary | ICD-10-CM

## 2022-03-10 DIAGNOSIS — N30.00 ACUTE CYSTITIS WITHOUT HEMATURIA: ICD-10-CM

## 2022-03-10 DIAGNOSIS — M54.41 CHRONIC BILATERAL LOW BACK PAIN WITH RIGHT-SIDED SCIATICA: ICD-10-CM

## 2022-03-10 DIAGNOSIS — R39.15 URGENCY OF URINATION: ICD-10-CM

## 2022-03-10 DIAGNOSIS — G89.29 CHRONIC BILATERAL LOW BACK PAIN WITH RIGHT-SIDED SCIATICA: ICD-10-CM

## 2022-03-10 DIAGNOSIS — G89.29 CHRONIC MIDLINE LOW BACK PAIN WITH RIGHT-SIDED SCIATICA: Primary | ICD-10-CM

## 2022-03-10 LAB
ALBUMIN UR-MCNC: 30 MG/DL
APPEARANCE UR: ABNORMAL
BACTERIA #/AREA URNS HPF: ABNORMAL /HPF
BILIRUB UR QL STRIP: ABNORMAL
COLOR UR AUTO: YELLOW
GLUCOSE UR STRIP-MCNC: NEGATIVE MG/DL
HGB UR QL STRIP: ABNORMAL
KETONES UR STRIP-MCNC: NEGATIVE MG/DL
LEUKOCYTE ESTERASE UR QL STRIP: ABNORMAL
NITRATE UR QL: POSITIVE
PH UR STRIP: 6 [PH] (ref 5–7)
RBC #/AREA URNS AUTO: ABNORMAL /HPF
SP GR UR STRIP: 1.02 (ref 1–1.03)
SQUAMOUS #/AREA URNS AUTO: ABNORMAL /LPF
UROBILINOGEN UR STRIP-ACNC: 0.2 E.U./DL
WBC #/AREA URNS AUTO: >100 /HPF

## 2022-03-10 PROCEDURE — 87186 SC STD MICRODIL/AGAR DIL: CPT | Performed by: INTERNAL MEDICINE

## 2022-03-10 PROCEDURE — 81001 URINALYSIS AUTO W/SCOPE: CPT | Performed by: INTERNAL MEDICINE

## 2022-03-10 PROCEDURE — 99203 OFFICE O/P NEW LOW 30 MIN: CPT | Performed by: INTERNAL MEDICINE

## 2022-03-10 PROCEDURE — 87088 URINE BACTERIA CULTURE: CPT | Performed by: INTERNAL MEDICINE

## 2022-03-10 PROCEDURE — 87086 URINE CULTURE/COLONY COUNT: CPT | Performed by: INTERNAL MEDICINE

## 2022-03-10 RX ORDER — SULFAMETHOXAZOLE/TRIMETHOPRIM 800-160 MG
1 TABLET ORAL 2 TIMES DAILY
Qty: 14 TABLET | Refills: 1 | Status: SHIPPED | OUTPATIENT
Start: 2022-03-10 | End: 2022-03-17

## 2022-03-10 RX ORDER — PREDNISONE 20 MG/1
20 TABLET ORAL DAILY
Qty: 5 TABLET | Refills: 0 | Status: SHIPPED | OUTPATIENT
Start: 2022-03-10 | End: 2022-03-14

## 2022-03-10 NOTE — LETTER
March 10, 2022      Tracie Feliciano  [address]  [place] [address]        Dear Tracie:    We are writing to inform you of your test results.    As discussed by phone, you do have a urinary tract infection.     It is remotely possible, you pain is due to an undiagnosed urinary tract infection.  Though this would be unusual.  That is why I would like you to delay the start of the prednisone:  JUST in case.     The more likely scenario, is that you have musculoskeletal pain AND a UTI.       Watchful waiting to see how you respond to the therapies for now.      Resulted Orders   UA with Microscopic reflex to Culture   Result Value Ref Range    Color Urine Yellow Colorless, Straw, Light Yellow, Yellow    Appearance Urine Slightly Cloudy (A) Clear    Glucose Urine Negative Negative mg/dL    Bilirubin Urine Small (A) Negative    Ketones Urine Negative Negative mg/dL    Specific Gravity Urine 1.025 1.003 - 1.035    Blood Urine Trace (A) Negative    pH Urine 6.0 5.0 - 7.0    Protein Albumin Urine 30  (A) Negative mg/dL    Urobilinogen Urine 0.2 0.2, 1.0 E.U./dL    Nitrite Urine Positive (A) Negative    Leukocyte Esterase Urine Large (A) Negative   Urine Microscopic Exam   Result Value Ref Range    Bacteria Urine Moderate (A) None Seen /HPF    RBC Urine 2-5 (A) 0-2 /HPF /HPF    WBC Urine >100 (A) 0-5 /HPF /HPF    Squamous Epithelials Urine Few (A) None Seen /LPF     If you have any questions or concerns, please call the clinic at the number listed above.     Sincerely,      Mattie Adrian MD/sam

## 2022-03-10 NOTE — PROGRESS NOTES
"  Assessment & Plan     Chronic midline low back pain with right-sided sciatica  - predniSONE (DELTASONE) 20 MG tablet; Take 1 tablet (20 mg) by mouth daily  - MR Lumbar Spine w/o Contrast; Future  - Pain Management Referral; Future  Chronic bilateral low back pain with right-sided sciatica  - predniSONE (DELTASONE) 20 MG tablet; Take 1 tablet (20 mg) by mouth daily  - MR Lumbar Spine w/o Contrast; Future  - Pain Management Referral; Future  All the above discussed at length for her future plan.   Side effects discussed and questions answered.     Urgency of urination  Patient will start Abx right away, delay trial prednisone so she can observe    - MR Lumbar Spine w/o Contrast; Future  - UA with Microscopic reflex to Culture  - Urine Microscopic Exam  - Urine Culture    Acute cystitis without hematuria  See above.   - sulfamethoxazole-trimethoprim (BACTRIM DS) 800-160 MG tablet; Take 1 tablet by mouth 2 times daily for 7 days    Review of prior external note(s) from - from  [facility]   Review of the result(s) of each unique test - previous xrays, labs.    Assessment requiring an independent historian(s) - family -  helps with history  Independent interpretation of a test performed by another physician/other qualified health care professional (not separately reported) - reviewed her xrays.   Ordering of each unique test  Prescription drug management         BMI:   Estimated body mass index is 27.21 kg/m  as calculated from the following:    Height as of 2/15/22: 1.638 m (5' 4.5\").    Weight as of this encounter: 73 kg (161 lb).           No follow-ups on file.    Mattie Adrian MD  [facility]    Rolo Hodges is a 76 year old who presents for the following health issues  accompanied by her spouse.    HPI     Discuss arthritis pain-Back,right hand, hip     75 y/o F here for  LBP w/R sciatica with insidious onset. .  H/o breast cancer (on tamoxifen therapy), " "hyperlipidemia,HTN, martin hip djd, LBP.       Seen by PCP for this issue 2/15/22 and started a conservative plan.        \"I have never experienced such pain\"    Pain in across the low back  Usually radiates above knee on R .   Lately some L radiation, stays above the knee    No saddle numbness.    Describes some urgency          Review of Systems   Constitutional, HEENT, cardiovascular, pulmonary, gi and gu systems are negative, except as otherwise noted.      Objective    /64 (BP Location: Right arm, Patient Position: Sitting, Cuff Size: Adult Regular)   Pulse 100   Temp 97.7  F (36.5  C) (Oral)   Wt 73 kg (161 lb)   SpO2 98%   BMI 27.21 kg/m    There is no height or weight on file to calculate BMI.  Physical Exam   GENERAL: healthy, alert and no distress  EYES: Eyes grossly normal to inspection, PERRL and conjunctivae and sclerae normal  MS: no gross musculoskeletal defects noted, no edema  SKIN: no suspicious lesions or rashes  NEURO: Normal strength and tone, mentation intact and speech normal  NEURO: no weakness of Lower extremities, more an apprehension.  Walking with walker, apprehensive with walking.  Normal reflexes, patellar, bilaterally.    PSYCH: mentation appears normal, affect normal/bright    Lab on 08/02/2021   Component Date Value Ref Range Status     Vitamin D, Total (25-Hydroxy) 08/02/2021 38  20 - 75 ug/L Final     Calcium Ionized 08/02/2021 5.1  4.4 - 5.2 mg/dL Final               "

## 2022-03-10 NOTE — PROGRESS NOTES
{PROVIDER CHARTING PREFERENCE:292103}    Rolo Hodges is a 76 year old who presents for the following health issues {ACCOMPANIED BY STATEMENT (Optional):254770}    Arthritis    History of Present Illness       Hyperlipidemia:  She presents for follow up of hyperlipidemia.  She is taking medication to lower cholesterol. She is not having myalgia or other side effects to statin medications.    Hypertension: She presents for follow up of hypertension.  She does not check blood pressure  regularly outside of the clinic. Outpatient blood pressures have not been over 140/90. She follows a low salt diet.     She eats 2-3 servings of fruits and vegetables daily.She consumes 3 sweetened beverage(s) daily.She exercises with enough effort to increase her heart rate 10 to 19 minutes per day.  She exercises with enough effort to increase her heart rate 3 or less days per week.   She is taking medications regularly.       {SUPERLIST (Optional):586512}  {additonal problems for provider to add (Optional):817419}    Review of Systems   Musculoskeletal: Positive for arthritis.      {ROS COMP (Optional):483363}      Objective    There were no vitals taken for this visit.  There is no height or weight on file to calculate BMI.  Physical Exam   {Exam List (Optional):738367}    {Diagnostic Test Results (Optional):493847}    {AMBULATORY ATTESTATION (Optional):968731}

## 2022-03-10 NOTE — PATIENT INSTRUCTIONS
Call to schedule imaging   :   maureen FRIEND    Pain team  will call you:   [facility] will call you to coordinate your care as prescribed by the provider.  If you don t hear from a representative within 2 business days, please call [phone]      Prednisone 20 mg daily for 5 days (take in mornings)

## 2022-03-10 NOTE — RESULT ENCOUNTER NOTE
Tracie Feliciano      As discussed by phone, you do have a urinary tract infection    It is remotely possible, you pain is due to an undiagnosed urinary tract infection.  Though this would be unusual.     That is why I would like you to delay the start of the prednisone:  JUST in case    The more likely scenario, is that you have musculoskeletal pain AND a UTI.       Watchful waiting to see how you respond to the therapies for now.     Sincerely,       NED AYALA M.D.

## 2022-03-11 LAB — BACTERIA UR CULT: ABNORMAL

## 2022-03-13 ENCOUNTER — NURSE TRIAGE (OUTPATIENT)
Dept: NURSING | Facility: CLINIC | Age: 77
End: 2022-03-13
Payer: MEDICARE

## 2022-03-13 DIAGNOSIS — G89.29 CHRONIC BILATERAL LOW BACK PAIN WITH RIGHT-SIDED SCIATICA: ICD-10-CM

## 2022-03-13 DIAGNOSIS — M54.41 CHRONIC BILATERAL LOW BACK PAIN WITH RIGHT-SIDED SCIATICA: ICD-10-CM

## 2022-03-13 DIAGNOSIS — G89.29 CHRONIC MIDLINE LOW BACK PAIN WITH RIGHT-SIDED SCIATICA: ICD-10-CM

## 2022-03-13 DIAGNOSIS — M54.41 CHRONIC MIDLINE LOW BACK PAIN WITH RIGHT-SIDED SCIATICA: ICD-10-CM

## 2022-03-13 NOTE — TELEPHONE ENCOUNTER
"Pt calling.     Pt was seen Thur, 3/10/22, at [facility] by Dr Mattie Adrian.. Arthritis in back, right hand, and hip. Right sided sciatica. Pt states pain in back radiates to right leg staying above knee and at times radiates to left leg staying above the knee.     Pt reports Dr Adrian prescribed prednisone for pain  at that visit, but after learning pt had UTI, the plan was to try the antibiotics to see if that relieved the back pain and if not, then to start prednisone following antibiotic use. 3/10/22. Bactrim DS prescribed x7days. Pt is on 4th day of 7 days.     Pt states she is in so much pain she wants to start prednisone today. Constant Pain 9-10/10 pain scale.  Tylenol ES not helping.     Pt reports she has MRI on Wednesday and she is hoping the prednisone can be started now to avoid rescheduling.     Right index finger, swollen and tender, not worsening and was present when pt saw Dr Adrian on 3/10/22.    Pt insisting on-call for Dr Adrian be paged today to okay her to start the prednisone today. Pt states she will continue care with Dr Adrian, not with PCP Dr Patel.    Back and right leg, and now feels pain above left leg above knee. Neck and above shoulder     No fever.    No abdominal pain.     No blood in urine. Pt reports \"pressure\" to urinate, not drinking much more than usual, has \"slight stream of urine\" and relieves the pressure for about 2hrs, then pressure returns.     Pt can be reached at [phone].     Per RN Triage protocol, pt advised to go to ED for pain, but pt insists that on-call be paged. Pt declines disposition. Caller given care advice per RN triage protocol. Caller verbalizes understanding and agreement of plan.    12:58pm paged on-call for Dr Mattie Adrian is Dr Mac Brumfield on call.     1:05pm Dr Mac Brumfield returned page: RN relayed pt's background, concerns, symptoms, and request. Per Dr Mac Brumfield: \"Pt may start the prednisone today and pt " "needs to call the clinic tomorrow for follow up\". RN read back plan.    1:08pm: RN spoke to pt to relay Dr Mac Brumfield's instructions. Pt verbalizes understanding and will call her primary clinic tomorrow in follow up as instructed.     Genie Staples RN   03/13/22 12:51 PM  [facility] Nurse Advisor    Reason for Disposition    [1] SEVERE back pain (e.g., excruciating) AND [2] sudden onset AND [3] age > 60    Additional Information    Negative: Passed out (i.e., lost consciousness, collapsed and was not responding)    Negative: Shock suspected (e.g., cold/pale/clammy skin, too weak to stand, low BP, rapid pulse)    Negative: Sounds like a life-threatening emergency to the triager    Negative: Major injury to the back (e.g., MVA, fall > 10 feet or 3 meters, penetrating injury, etc.)    Negative: Followed a tailbone injury    Negative: [1] Pain in the upper back over the ribs (rib cage) AND [2] radiates (travels, goes) into chest    Negative: [1] Pain in the upper back over the ribs (rib cage) AND [2] worsened by coughing (or clearly increases with breathing)    Negative: Back pain during pregnancy    Negative: Pain mainly in flank (i.e., in the side, over the lower ribs or just below the ribs)    Protocols used: BACK PAIN-A-AH    COVID 19 Nurse Triage Plan/Patient Instructions    Please be aware that novel coronavirus (COVID-19) may be circulating in the community. If you develop symptoms such as fever, cough, or SOB or if you have concerns about the presence of another infection including coronavirus (COVID-19), please contact your health care provider or visit [email or web address].     Disposition/Instructions    ED Visit recommended. Follow protocol based instructions.     Bring Your Own Device:  Please also bring your smart device(s) (smart phones, tablets, laptops) and their charging cables for your personal use and to communicate with your care team during your visit.    Thank you for taking " steps to prevent the spread of this virus.  o Limit your contact with others.  o Wear a simple mask to cover your cough.  o Wash your hands well and often.    Resources    [facility] [place]: About COVID-19: [email or web address]    CDC: What to Do If You're Sick: www.cdc.gov/coronavirus/2019-ncov/about/steps-when-sick.html    CDC: Ending Home Isolation: www.cdc.gov/coronavirus/2019-ncov/hcp/disposition-in-home-patients.html     CDC: Caring for Someone: www.cdc.gov/coronavirus/2019-ncov/if-you-are-sick/care-for-someone.html     [facility]: Interim Guidance for Hospital Discharge to Home: www.[facility].[place]/diseases/coronavirus/hcp/hospdischarge.pdf    [facility] clinical trials (COVID-19 research studies): [email or web address]-clinical-trials     Below are the COVID-19 hotlines at the [place] [facility] ([facility]). Interpreters are available.   o For health questions: Call 046-547-6271 or 1-733.822.1412 (7 a.m. to 7 p.m.)  o For questions about schools and childcare: Call 650-243-9087 or 1-922.389.7361 (7 a.m. to 7 p.m.)

## 2022-03-14 RX ORDER — PREDNISONE 20 MG/1
20 TABLET ORAL DAILY
Qty: 5 TABLET | Refills: 0 | Status: SHIPPED | OUTPATIENT
Start: 2022-03-14 | End: 2022-03-23

## 2022-03-14 NOTE — TELEPHONE ENCOUNTER
Patient calling to follow-up with Mattie Pérez on what happened over the weekend.  Please see FNA note below.    Patient did start the prednisone yesterday as approved by on-call MD.  She felt she needed to start the prednisone as she was in so much pain but is also concerned that her body may not fight off the UTI while being on prednisone.  She continues on the abx as well.  Continues to have the bladder pressure.     Patient request that a message be sent to Mattie Pérez to advise further on treatment.   Patient wondering if she needs to be on a longer course of abx now that she has started prednisone or how to proceed.    Ok to leave a detailed message on patient's VM or speak with her , per patient    Jeffery Talley RN  [facility]

## 2022-03-14 NOTE — TELEPHONE ENCOUNTER
MD called      Pain clinic not avail for a month  Patient has lots of questions    She will call pain clinic and try to get in sooner for her pain appt at another location.     She can take both prednisone and Abx.,  It is not dangerous to take abx and prednisone.

## 2022-03-16 ENCOUNTER — TELEPHONE (OUTPATIENT)
Dept: FAMILY MEDICINE | Facility: CLINIC | Age: 77
End: 2022-03-16

## 2022-03-16 ENCOUNTER — ANCILLARY PROCEDURE (OUTPATIENT)
Dept: MRI IMAGING | Facility: CLINIC | Age: 77
End: 2022-03-16
Attending: INTERNAL MEDICINE
Payer: MEDICARE

## 2022-03-16 DIAGNOSIS — M54.41 CHRONIC MIDLINE LOW BACK PAIN WITH RIGHT-SIDED SCIATICA: ICD-10-CM

## 2022-03-16 DIAGNOSIS — N30.00 ACUTE CYSTITIS WITHOUT HEMATURIA: Primary | ICD-10-CM

## 2022-03-16 DIAGNOSIS — G89.29 CHRONIC BILATERAL LOW BACK PAIN WITH RIGHT-SIDED SCIATICA: ICD-10-CM

## 2022-03-16 DIAGNOSIS — M54.41 CHRONIC BILATERAL LOW BACK PAIN WITH RIGHT-SIDED SCIATICA: ICD-10-CM

## 2022-03-16 DIAGNOSIS — R39.15 URGENCY OF URINATION: ICD-10-CM

## 2022-03-16 DIAGNOSIS — G89.29 CHRONIC MIDLINE LOW BACK PAIN WITH RIGHT-SIDED SCIATICA: ICD-10-CM

## 2022-03-16 PROCEDURE — 72148 MRI LUMBAR SPINE W/O DYE: CPT | Mod: TC | Performed by: RADIOLOGY

## 2022-03-16 PROCEDURE — G1004 CDSM NDSC: HCPCS | Mod: TC | Performed by: RADIOLOGY

## 2022-03-16 NOTE — LETTER
"March 17, 2022      Tracie Feliciano  [address]  [facility] [address]      Dear Tracie:    We are writing to inform you of your test results.    The MRI findings may correlate with your symptoms.  See last paragraph:    \"However, the right paracentral disc herniation arising from the L4-L5 disc may contact the descending right L5 nerve root in the right lateral recess of the spinal canal   possibly resulting in irritation or impingement. \"     If you need another course of prednisone to cool things off while waiting for your intervention, let me know.     I am hoping that treating the urinary tract infection may have helped some symptoms you were dealing with.    Resulted Orders   MR Lumbar Spine w/o Contrast    Narrative    MRI OF THE LUMBAR SPINE WITHOUT CONTRAST 3/16/2022 10:35 AM     COMPARISON: Lumbar spine x-ray series 2/15/2022.    HISTORY: Low back pain, more than six weeks. Chronic midline low back  pain with right-sided sciatica. Chronic bilateral low back pain with  right-sided sciatica. Urgency of urination.    TECHNIQUE: Multiplanar, multisequence MRI images of the lumbar spine  were acquired without IV contrast.    FINDINGS: There are five lumbar-type vertebrae for the purposes of  this dictation.     There is normal alignment of the lumbar vertebrae. Vertebral body  heights of the lumbar spine are normal. There is a T1 and T2  hyperintense lesion in the left side of the L1 vertebral body also  involving the left L1 pedicle consistent with a benign hemangioma.  There is a T1 and T2 hyperintense nodule in the posterior aspect of  the T12 vertebral body also consistent with a benign hemangioma.  Marrow signal throughout the lumbar vertebrae is otherwise within  normal limits. There is no evidence for fracture or pathologic bony  lesion of the lumbar spine.     There is loss of disc height, disc desiccation and posterior disc  bulging/herniation to varying degrees at all levels of the " lumbar  spine.     The tip of the conus medullaris is at the L1-L2 level which is within  normal limits. There is no evidence for intrathecal abnormality.     Level by level:     T12-L1: Normal disc height and signal. No herniation. Mild facet  arthropathy bilaterally. No spinal canal stenosis. No foraminal  stenosis on either side.     L1-L2: Loss of disc height, disc desiccation and mild circumferential  disc bulging. No herniation. Moderate facet arthropathy bilaterally.  No spinal canal stenosis. No foraminal stenosis on either side.    L2-L3: Loss of disc height, disc desiccation and mild circumferential  disc bulging. No herniation. Moderate facet arthropathy bilaterally.  No spinal canal stenosis. No foraminal stenosis on either side.    L3-L4: Loss of disc height, disc desiccation and mild circumferential  disc bulging. No herniation. Moderate facet arthropathy bilaterally.  Ligamentum flavum thickening bilaterally. No spinal canal stenosis. No  foraminal stenosis on either side.    L4-L5: Loss of disc height, disc desiccation and circumferential disc  bulging with a superimposed small right paracentral disc herniation  (protrusion). Moderate facet arthropathy bilaterally. Ligamentum  flavum thickening bilaterally. Mild spinal canal narrowing. Mild  bilateral neural foraminal narrowing.    L5-S1: Loss of disc height, disc desiccation and mild circumferential  disc bulging. No herniation. Mild facet arthropathy bilaterally. No  spinal canal stenosis. No foraminal stenosis on either side.      Impression    IMPRESSION:  1. Diffuse degenerative change of the lumbar spine as detailed above.  2. No significant spinal canal or neural foraminal stenosis at any  level of the lumbar spine. However, the right paracentral disc  herniation arising from the L4-L5 disc may contact the descending  right L5 nerve root in the right lateral recess of the spinal canal  possibly resulting in irritation or impingement.      JER VEE MD         SYSTEM ID:  [id number]   If you have any questions or concerns, please call the clinic at the number listed above.     Sincerely,      Mattie Adrian MD/sam

## 2022-03-16 NOTE — TELEPHONE ENCOUNTER
Patient notified of provider's message as written. Patient verbalized understanding. Lab appointment scheduled for 3/21/22. Patient planning to call [phone] with an update on back pain 3/21/22 as well.     Ene Handy RN   [facility]

## 2022-03-16 NOTE — TELEPHONE ENCOUNTER
MRI results will be in soon, no problem with upcoming appointment.     She could recheck UA a couple days after the abx are done, to ensure the UTI is completely treated.   (order is in)    I think we can see if symptoms remain improved after done with prednisone, can extend if symptoms seem to get worse while off:  Play by ear for now.

## 2022-03-16 NOTE — TELEPHONE ENCOUNTER
Patient called the clinic.  1.  She had the MRI completed today and is inquiring if the results will be available for 4/13/22.    2.  Patient reports improvement in symptoms with current treatment regimen.  She continues to be incontinent of bladder due to difficulty transferring out of the chair and getting to the bathroom in time.    Patient was started on sulfamethoxazole-trimethoprim (BACTRIM DS) 800-160 MG tablet on 3/10/22.    She is taking her last dose today.    Patient is inquiring if she will need a refill for the sulfamethoxazole-trimethoprim (BACTRIM DS) 800-160 MG tablet?    Does she need to schedule an appointment for evaluation for need for antibiotic treatment?    2.  Patient is currently taking predniSONE (DELTASONE) 20 MG tablet for   Chronic midline low back pain with right-sided sciatica [M54.41, G89.29]       Chronic bilateral low back pain with right-sided sciatica [M54.41, G89.29]        Patient will be completing this medication regimen this weekend.    Patient reports some relief with this medication.    Patient is inquiring if she will need a refill (long term use) for this medication for pain management?    Does patient need an appointment for further evaluation for need for predniSONE (DELTASONE) 20 MG tablet and pain management?      Please call patient [phone] with the provider's recommendations.

## 2022-03-17 NOTE — RESULT ENCOUNTER NOTE
"Tracie Feliciano    The MRI findings may correlate with your symptoms.  See last paragraph;     \"However, the right paracentral disc  herniation arising from the L4-L5 disc may contact the descending  right L5 nerve root in the right lateral recess of the spinal canal  possibly resulting in irritation or impingement. \"    If you need another course of prednisone to cool things off while waiting for your intervention, let me know.     I am hoping that treating the urinary tract infection may have helped some symptoms you were dealing with...     Sincerely,       NED AYALA M.D.      "

## 2022-03-21 ENCOUNTER — LAB (OUTPATIENT)
Dept: LAB | Facility: CLINIC | Age: 77
End: 2022-03-21
Payer: MEDICARE

## 2022-03-21 ENCOUNTER — TELEPHONE (OUTPATIENT)
Dept: FAMILY MEDICINE | Facility: CLINIC | Age: 77
End: 2022-03-21

## 2022-03-21 DIAGNOSIS — M54.41 CHRONIC BILATERAL LOW BACK PAIN WITH RIGHT-SIDED SCIATICA: ICD-10-CM

## 2022-03-21 DIAGNOSIS — N30.00 ACUTE CYSTITIS WITHOUT HEMATURIA: ICD-10-CM

## 2022-03-21 DIAGNOSIS — G89.29 CHRONIC BILATERAL LOW BACK PAIN WITH RIGHT-SIDED SCIATICA: ICD-10-CM

## 2022-03-21 DIAGNOSIS — M54.41 CHRONIC MIDLINE LOW BACK PAIN WITH RIGHT-SIDED SCIATICA: ICD-10-CM

## 2022-03-21 DIAGNOSIS — G89.29 CHRONIC MIDLINE LOW BACK PAIN WITH RIGHT-SIDED SCIATICA: ICD-10-CM

## 2022-03-21 LAB
ALBUMIN UR-MCNC: NEGATIVE MG/DL
APPEARANCE UR: CLEAR
BILIRUB UR QL STRIP: NEGATIVE
COLOR UR AUTO: YELLOW
GLUCOSE UR STRIP-MCNC: NEGATIVE MG/DL
HGB UR QL STRIP: NEGATIVE
KETONES UR STRIP-MCNC: NEGATIVE MG/DL
LEUKOCYTE ESTERASE UR QL STRIP: NEGATIVE
NITRATE UR QL: NEGATIVE
PH UR STRIP: 5.5 [PH] (ref 5–7)
RBC #/AREA URNS AUTO: ABNORMAL /HPF
SP GR UR STRIP: 1.01 (ref 1–1.03)
SQUAMOUS #/AREA URNS AUTO: ABNORMAL /LPF
UROBILINOGEN UR STRIP-ACNC: 0.2 E.U./DL
WBC #/AREA URNS AUTO: ABNORMAL /HPF

## 2022-03-21 PROCEDURE — 81001 URINALYSIS AUTO W/SCOPE: CPT

## 2022-03-21 NOTE — TELEPHONE ENCOUNTER
Reason for Call:  Other prescription    Detailed comments: Patient is requesting refill of prednisone. She would like to know when she should come back into the clinic for follow up on ankle and foot edema.     Phone Number Patient can be reached at: Home number on file [phone] (home)    Best Time: any    Can we leave a detailed message on this number? YES    Call taken on 3/21/2022 at 4:25 PM by Elizabeth King

## 2022-03-22 DIAGNOSIS — G89.29 CHRONIC MIDLINE LOW BACK PAIN WITH RIGHT-SIDED SCIATICA: ICD-10-CM

## 2022-03-22 DIAGNOSIS — M54.41 CHRONIC MIDLINE LOW BACK PAIN WITH RIGHT-SIDED SCIATICA: ICD-10-CM

## 2022-03-22 DIAGNOSIS — M54.41 CHRONIC BILATERAL LOW BACK PAIN WITH RIGHT-SIDED SCIATICA: ICD-10-CM

## 2022-03-22 DIAGNOSIS — G89.29 CHRONIC BILATERAL LOW BACK PAIN WITH RIGHT-SIDED SCIATICA: ICD-10-CM

## 2022-03-22 RX ORDER — PREDNISONE 20 MG/1
TABLET ORAL
Qty: 5 TABLET | Refills: 0 | OUTPATIENT
Start: 2022-03-22

## 2022-03-22 NOTE — TELEPHONE ENCOUNTER
Called patient.  She stated that the prednisone did not seem to help with the back pain and questions if she should even take another course of it.  She then inquired about the Celebrex stating that it is ready at the pharmacy but she is not sure what it is for.  Explained that it is prescribed for her back pain and osteoarthritis.  She verbalized understanding and stated that she would just take that for the back pain for now.    Patient c/o left foot and ankle swelling.  Stated this has been going on for a long time now and would like to be seen for further evaluation.  Noted in chart that patient has chronic LE edema.    Patient c/o chest pain also going on for awhile (months).  Pain is across the chest just above the breast.  Hurts when she coughs or sneezes.  Is difficult to find a comfortable position at night because of tenderness in the chest.  Denies any other symptoms such as SOB, numbness/tingling, changes in vision, etc     Patient requesting an appointment with Mattie Pérez or PCP this week.    Mattie Pérez is not in this week.   Will route to PCP to advise  Melanie Unger only has a 5:40 PM virtual appointment today 3/22/2022 and an 11:00 AM same day slot tomorrow 3/23/2022      Jeffery Talley RN  [facility]

## 2022-03-22 NOTE — TELEPHONE ENCOUNTER
See 3/21/2022 phone encounter  Patient has appointment with PCP tomorrow 3/23/2022    Jeffery Talley RN  [facility]

## 2022-03-23 ENCOUNTER — OFFICE VISIT (OUTPATIENT)
Dept: FAMILY MEDICINE | Facility: CLINIC | Age: 77
End: 2022-03-23
Payer: MEDICARE

## 2022-03-23 ENCOUNTER — ANCILLARY PROCEDURE (OUTPATIENT)
Dept: GENERAL RADIOLOGY | Facility: CLINIC | Age: 77
End: 2022-03-23
Attending: FAMILY MEDICINE
Payer: MEDICARE

## 2022-03-23 VITALS
OXYGEN SATURATION: 98 % | TEMPERATURE: 98.2 F | BODY MASS INDEX: 26.36 KG/M2 | DIASTOLIC BLOOD PRESSURE: 79 MMHG | SYSTOLIC BLOOD PRESSURE: 130 MMHG | WEIGHT: 156 LBS | HEART RATE: 89 BPM

## 2022-03-23 DIAGNOSIS — R07.9 CHEST PAIN, UNSPECIFIED TYPE: ICD-10-CM

## 2022-03-23 DIAGNOSIS — I10 HYPERTENSION GOAL BP (BLOOD PRESSURE) < 140/90: ICD-10-CM

## 2022-03-23 DIAGNOSIS — I87.2 VENOUS (PERIPHERAL) INSUFFICIENCY: ICD-10-CM

## 2022-03-23 DIAGNOSIS — R07.9 CHEST PAIN, UNSPECIFIED TYPE: Primary | ICD-10-CM

## 2022-03-23 DIAGNOSIS — M51.16 LUMBAR DISC HERNIATION WITH RADICULOPATHY: ICD-10-CM

## 2022-03-23 PROBLEM — M51.369 DDD (DEGENERATIVE DISC DISEASE), LUMBAR: Status: RESOLVED | Noted: 2022-02-16 | Resolved: 2022-03-23

## 2022-03-23 PROBLEM — M25.551 HIP PAIN, RIGHT: Status: RESOLVED | Noted: 2022-02-22 | Resolved: 2022-03-23

## 2022-03-23 PROBLEM — M54.50 LUMBAR SPINE PAIN: Status: RESOLVED | Noted: 2022-02-22 | Resolved: 2022-03-23

## 2022-03-23 PROCEDURE — 93000 ELECTROCARDIOGRAM COMPLETE: CPT | Performed by: FAMILY MEDICINE

## 2022-03-23 PROCEDURE — 71046 X-RAY EXAM CHEST 2 VIEWS: CPT | Performed by: RADIOLOGY

## 2022-03-23 PROCEDURE — 99214 OFFICE O/P EST MOD 30 MIN: CPT | Performed by: FAMILY MEDICINE

## 2022-03-23 RX ORDER — CELECOXIB 200 MG/1
CAPSULE ORAL
Qty: 90 CAPSULE | Refills: 0
Start: 2022-03-23 | End: 2022-03-28

## 2022-03-23 NOTE — PROGRESS NOTES
"  Assessment & Plan     (R07.9) Chest pain, unspecified type  (primary encounter diagnosis)  Comment: mild, chronic, waxing and waning, non exertional, associated with occasional coughing or sneezing; Differential diagnoses would include: chest wall pain, GERD, anxiety, no evidence of fluid overload or infection; symptoms not consistent with angina or PE   Plan: EKG 12-lead complete w/read - Clinics, XR Chest        2 Views        NSAID as planned. 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                BMI:   Estimated body mass index is 26.36 kg/m  as calculated from the following:    Height as of 2/15/22: 1.638 m (5' 4.5\").    Weight as of this encounter: 70.8 kg (156 lb).       See Patient Instructions    Return in about 6 months (around 9/15/2022) for Wellness visit (fasting labs up to one week prior).    Melanie Patel MD  [facility] EDGAR Hodges is a 77 year old who presents for the following health issues     Musculoskeletal Problem    History of Present Illness       Hyperlipidemia:  She presents for follow up of hyperlipidemia.  She is taking medication to lower cholesterol. She is not having myalgia or other side effects to statin " medications.    Hypertension: She presents for follow up of hypertension.  She does not check blood pressure  regularly outside of the clinic. Outpatient blood pressures have not been over 140/90. She follows a low salt diet.     She eats 2-3 servings of fruits and vegetables daily.She consumes 3 sweetened beverage(s) daily.She exercises with enough effort to increase her heart rate 10 to 19 minutes per day.  She exercises with enough effort to increase her heart rate 3 or less days per week.   She is taking medications regularly.       She has right low back and leg pain that is severe, resulting in use of walker. Little response to physical therapy, tylenol, and trial of prednisone. She wonders if she should start celecoxib as prescribed and is worried about her MRI results. She doesn't want surgery.     She is also worried about her chronic lower extremity edema, seems worse on the left.     Review of Systems   CONSTITUTIONAL: NEGATIVE for fever, chills, change in weight  INTEGUMENTARY/SKIN: NEGATIVE for worrisome rashes, moles or lesions  ENT/MOUTH: NEGATIVE for ear, mouth and throat problems  RESP: NEGATIVE for significant cough or SOB  CV: chest pain, mild, off and on, triggered by coughing or sneezing during the last few months   MUSCULOSKELETAL: see HPI   NEURO: NEGATIVE for weakness, dizziness or paresthesias      Objective    /79 (BP Location: Right arm, Patient Position: Sitting, Cuff Size: Adult Regular)   Pulse 89   Temp 98.2  F (36.8  C) (Oral)   Wt 70.8 kg (156 lb)   SpO2 98%   BMI 26.36 kg/m    Body mass index is 26.36 kg/m .  Physical Exam   GENERAL: healthy, alert and no distress  EYES: Eyes grossly normal to inspection, PERRL and conjunctivae and sclerae normal  NECK: no adenopathy, no asymmetry, masses, or scars and thyroid normal to palpation  RESP: lungs clear to auscultation - no rales, rhonchi or wheezes  CV: regular rates and rhythm, normal S1 S2, no S3 or S4, no murmur, click or  rub and woody bipedal edema   MS: antalgic gait with use of walker; no deformity   SKIN: scattered varicosities and spider veins of lower extremities   NEURO: Normal strength and tone, mentation intact and speech normal  PSYCH: mentation appears normal, affect flat, anxious, judgement and insight intact and appearance well groomed    Lab on 03/21/2022   Component Date Value Ref Range Status     Color Urine 03/21/2022 Yellow  Colorless, Straw, Light Yellow, Yellow Final     Appearance Urine 03/21/2022 Clear  Clear Final     Glucose Urine 03/21/2022 Negative  Negative mg/dL Final     Bilirubin Urine 03/21/2022 Negative  Negative Final     Ketones Urine 03/21/2022 Negative  Negative mg/dL Final     Specific Gravity Urine 03/21/2022 1.015  1.003 - 1.035 Final     Blood Urine 03/21/2022 Negative  Negative Final     pH Urine 03/21/2022 5.5  5.0 - 7.0 Final     Protein Albumin Urine 03/21/2022 Negative  Negative mg/dL Final     Urobilinogen Urine 03/21/2022 0.2  0.2, 1.0 E.U./dL Final     Nitrite Urine 03/21/2022 Negative  Negative Final     Leukocyte Esterase Urine 03/21/2022 Negative  Negative Final     RBC Urine 03/21/2022 0-2  0-2 /HPF /HPF Final     WBC Urine 03/21/2022 0-5  0-5 /HPF /HPF Final     Squamous Epithelials Urine 03/21/2022 Few (A) None Seen /LPF Final     No results found for this or any previous visit (from the past 24 hour(s)).

## 2022-03-23 NOTE — LETTER
March 24, 2022      Tracie Feliciano  [address]  [facility] [address]        Dear ,    We are writing to inform you of your test results.    Your results are normal        Resulted Orders   XR Chest 2 Views    Narrative    CHEST TWO VIEWS March 23, 2022 1:11 PM     HISTORY: Chest pain, unspecified type.    COMPARISON: 10/3/2018.    FINDINGS: Heart size and pulmonary vascularity are within normal  limits. The lungs are clear. No pneumothorax or pleural effusion.       Impression    IMPRESSION: No radiographic evidence of acute chest abnormality.     LELA LUJAN MD         SYSTEM ID:  [id number]       If you have any questions or concerns, please call the clinic at the number listed above.       Sincerely,      Melanie Patel MD/garcia

## 2022-03-23 NOTE — PROGRESS NOTES
DME (Durable Medical Equipment) Orders and Documentation  Orders Placed This Encounter   Procedures     Orthotics, Prosthetics and Custom Compression Order      The patient was assessed and it was determined the patient is in need of the following listed DME Supplies/Equipment. Please complete supporting documentation below to demonstrate medical necessity.      DME All Other Item(s) Documentation    List reason for need and supporting documentation for medical necessity below for each DME item.     1. Venous insufficiency

## 2022-03-23 NOTE — PATIENT INSTRUCTIONS
Patient Education     Understanding Chronic Venous Insufficiency  Problems with the veins in the legs may lead to chronic venous insufficiency (CVI). CVI means that there is a long-term problem with the veins not being able to pump blood back to your heart. When this happens, blood stays in the legs and causes swelling and aching.   Two problems that may lead to chronic venous insufficiency are:    Damaged valves. Valves keep blood flowing from the legs through the blood vessels and back to the heart. When the valves are damaged, blood does not flow as well.     Deep vein thrombosis (DVT).  Blood clots may form in the deep veins of the legs. This may cause pain, redness, and swelling in the legs. It may also block the flow of blood back to the heart. Seek medical care right away if you have these symptoms.  A blood clot in the leg can also break off and travel to the lungs. This is called  pulmonary embolism (PE).  In the lungs, the clot can cut off the flow of blood. This may cause chest pain, trouble breathing, sweating, a fast heartbeat, coughing (may cough up blood), and fainting. This is a medical emergency and may cause death. Call 911 if you have these symptoms.  CVI can t be cured, but you can control leg swelling to reduce the likelihood of sores (ulcers).  Recognizing the symptoms  Be aware of the following:    If you stand or sit with your feet down for long periods, your legs may ache or feel heavy.    Swollen ankles are possibly the most common symptom of CVI.    As swelling increases, the skin over your ankles may show red spots or a brownish tinge. The skin may feel leathery or scaly, and may start to itch.    If swelling is not controlled, an ulcer (open wound) may form.  What you can do  Reduce your risk of developing ulcers by doing the following:    Increase blood flow back to your heart by elevating your legs, exercising daily, and wearing elastic stockings.    Boost blood flow in your legs by  1600 Pt shivering when up to the chair this afternoon. Dr. Keara Toribio notified. Ativan given per CIWA protocol. Pt still restless and trying to get out of bed. [place] vest on. Dr. Keara Toribio notified again. Will start precedex gtt. losing extra weight.    If you must stand or sit in one place for a period of time, keep your blood moving by wiggling your toes, shifting your body position, and rising up on the balls of your feet.    [employer] last reviewed this educational content on 10/1/2019    9717-4029 [employer]. All rights reserved. This information is not intended as a substitute for professional medical care. Always follow your healthcare professional's instructions.           Patient Education     Herniated Intervertebral Disk   A herniated disk happens when a spinal disk tears. Spinal disks are gel-filled cushions that sit between the bones of the spine (vertebrae). If a disk tears, the center may bulge out. The disk may then press on nearby nerves. This can cause severe pain. Numbness or tingling in an arm or leg may also occur.  You may need X-rays of the spine. If you've had the pain for a few weeks and it is not getting better after treatment, you may need an MRI scan. Treatment for a herniated disk often includes pain medicines and home care. Surgery is not often done to treat a herniated disk unless the nerve problem is causing weakness or numbness.    Home care  Medicines may be prescribed to help ease pain, spasm, and swelling. You may be given a prescription. Or you may be told to use an over-the-counter pain reliever such as ibuprofen or naproxen. Take all medicines as directed.  For neck pain:    Use a pillow that supports your head and keeps your spine in a neutral position. Don't tilt your head forward or backward.  For back pain:    Try not to sit for long periods, especially if your low back is affected. Sitting puts more stress on the low back than standing or walking.    Sleep on a firm mattress with a pillow under your knees.    Have a regular exercise program to help strengthen the muscles that support the spine.    Don't do complete bed rest. It often isn t helpful. It may even make your condition  worse.  General care    During the first 2 days after a pain flare-up, put an ice pack on the painful area for 20 minutes. To make an ice pack, put ice cubes in a plastic bag that seals at the top. Wrap the bag in a clean, thin towel or cloth. Never put ice or an ice pack directly on the skin. Use the ice pack every 2 to 4 hours. This helps reduce swelling and pain.     Physical therapy or osteopathic manipulative therapy may help if the pain doesn t go away. Talk with your healthcare provider about these therapies.    Follow-up care  Follow up with your healthcare provider, or as advised. This is very important. If you ve been referred to a specialist, make an appointment right away.  When to get medical advice  Call your healthcare provider right away if any of these occur:    Back pain gets worse    New weakness, numbness, or pain in one or both arms or legs    Numbness or tingling in your buttock or groin area    Your foot drags as you walk or you have new weakness in a foot    You can t control your bowel or bladder    You aren t able to have a bowel movement or pass urine    Fever of 100.4 F (38 C) or higher, or as advised by your provider    New symptoms that were not present during today s visit  [employer] last reviewed this educational content on 9/1/2019 2000-2021 [employer]. All rights reserved. This information is not intended as a substitute for professional medical care. Always follow your healthcare professional's instructions.

## 2022-03-28 ENCOUNTER — TELEPHONE (OUTPATIENT)
Dept: FAMILY MEDICINE | Facility: CLINIC | Age: 77
End: 2022-03-28
Payer: MEDICARE

## 2022-03-28 DIAGNOSIS — I89.0 LYMPHEDEMA: ICD-10-CM

## 2022-03-28 DIAGNOSIS — M51.16 LUMBAR DISC HERNIATION WITH RADICULOPATHY: Primary | ICD-10-CM

## 2022-03-28 RX ORDER — ACETAMINOPHEN 500 MG
500-1000 TABLET ORAL EVERY 8 HOURS PRN
COMMUNITY
Start: 2022-03-28 | End: 2022-09-21

## 2022-03-28 RX ORDER — CELECOXIB 200 MG/1
200 CAPSULE ORAL 2 TIMES DAILY PRN
Refills: 0
Start: 2022-03-28 | End: 2022-04-13

## 2022-03-28 NOTE — TELEPHONE ENCOUNTER
Patient called the clinic.  She was evaluated and treated on 3/26/22.  1.  Patient continues to experience swelling to bilateral lower extremities especially the left foot and ankle    Patient has been taking:  celecoxib (CELEBREX) 200 MG capsule  Sig: TAKE 1 CAPSULE(200 MG) BY MOUTH DAILY with little to no relief.    Patient is inquiring if she can increase the dose to Sig: TAKE 1 CAPSULE(200 MG) BY MOUTH TWICE A DAY?      2.  Patient is taking Tylenol 1000mg : 3 times a day as needed for pain.    3.  Patient is wearing measured compression stockings with no noted relief at this time.    She is inquiring about how long to wait to note effective.    Please call patient at [phone] with the provider's recommendations.

## 2022-03-28 NOTE — TELEPHONE ENCOUNTER
Called and informed patient of the provider's recommendations.    Patient states that she will increase the celecoxib (CELEBREX) 200 MG capsule : to twice a day for 1-2 weeks.    She will update the clinic.    Patient would like to continue with the compression stocking for another 2 weeks or so.    She will call the clinic with update if her symptoms do not improve or resolve.    Patient will call for referral to Lymphedema therapy in 2 weeks or so.    Patient verbalized understanding and has no further questions or concerns at this time.

## 2022-03-28 NOTE — TELEPHONE ENCOUNTER
Okay for lymphedema therapy referral   Okay to use celebrex BID as needed short term   Melanie Patel MD

## 2022-04-13 ENCOUNTER — TELEPHONE (OUTPATIENT)
Dept: FAMILY MEDICINE | Facility: CLINIC | Age: 77
End: 2022-04-13

## 2022-04-13 ENCOUNTER — OFFICE VISIT (OUTPATIENT)
Dept: PALLIATIVE MEDICINE | Facility: CLINIC | Age: 77
End: 2022-04-13
Attending: INTERNAL MEDICINE
Payer: MEDICARE

## 2022-04-13 VITALS — DIASTOLIC BLOOD PRESSURE: 81 MMHG | SYSTOLIC BLOOD PRESSURE: 121 MMHG | HEART RATE: 65 BPM

## 2022-04-13 DIAGNOSIS — M54.16 LUMBAR RADICULOPATHY: Primary | ICD-10-CM

## 2022-04-13 DIAGNOSIS — M79.18 MYOFASCIAL MUSCLE PAIN: ICD-10-CM

## 2022-04-13 DIAGNOSIS — M54.41 CHRONIC MIDLINE LOW BACK PAIN WITH RIGHT-SIDED SCIATICA: ICD-10-CM

## 2022-04-13 DIAGNOSIS — M54.41 CHRONIC BILATERAL LOW BACK PAIN WITH RIGHT-SIDED SCIATICA: ICD-10-CM

## 2022-04-13 DIAGNOSIS — M51.16 LUMBAR DISC HERNIATION WITH RADICULOPATHY: Primary | ICD-10-CM

## 2022-04-13 DIAGNOSIS — G89.29 CHRONIC BILATERAL LOW BACK PAIN WITH RIGHT-SIDED SCIATICA: ICD-10-CM

## 2022-04-13 DIAGNOSIS — G89.29 CHRONIC MIDLINE LOW BACK PAIN WITH RIGHT-SIDED SCIATICA: ICD-10-CM

## 2022-04-13 PROCEDURE — 99204 OFFICE O/P NEW MOD 45 MIN: CPT | Performed by: PAIN MEDICINE

## 2022-04-13 RX ORDER — CELECOXIB 200 MG/1
200 CAPSULE ORAL 2 TIMES DAILY PRN
Qty: 60 CAPSULE | Refills: 11 | Status: SHIPPED | OUTPATIENT
Start: 2022-04-13 | End: 2022-05-18

## 2022-04-13 ASSESSMENT — PAIN SCALES - GENERAL: PAINLEVEL: MODERATE PAIN (4)

## 2022-04-13 NOTE — TELEPHONE ENCOUNTER
Informed pt of medication refill. Also advised pt can call and update us after her cortisone shot if she does not feel it is helpful. Pt also mentioned she thought physical therapy was helpful in the past. Informed her the referral was placed a couple months ago and if she needs a new referral to let us know.    Cayla BUSCH RN, BSN  [facility]

## 2022-04-13 NOTE — TELEPHONE ENCOUNTER
Patient notified of Provider's message as written.  Patient verbalized understanding.    Has been wearing compression stockings.  Swelling has improved  Patient has been taking Celebrex 1 tablet twice a day for the past 2 weeks.  Should she continue with Celebrex BID?    Jeffery Talley RN  [facility]

## 2022-04-13 NOTE — PATIENT INSTRUCTIONS
- Further procedures recommended:    - reasonable to consider L4-5, L5-S1 TRANSFORAMINAL EPIDURAL STEROID INJECTION    - Medication Management:    - reasonable to continue current regimen for now. Would consider low dose gabapentin long term instead of celebrex   - Physical Therapy: continue  - Follow up: pending decision on how to proceed     ----------------------------------------------------------------  Clinic Number:  [phone]   Call with any questions about your care and for scheduling assistance.   Calls are returned Monday through Friday between 8 AM and 4:30 PM. We usually get back to you within 2 business days depending on the issue/request.    If we are prescribing your medications:  For opioid medication refills, call the clinic or send a [facility] message 7 days in advance.  Please include:  Name of requested medication  Name of the pharmacy.  For non-opioid medications, call your pharmacy directly to request a refill. Please allow 3-4 days to be processed.   Per [place] State Law:  All controlled substance prescriptions must be filled within 30 days of being written.    For those controlled substances allowing refills, pickup must occur within 30 days of last fill.      We believe regular attendance is key to your success in our program!    Any time you are unable to keep your appointment we ask that you call us at least 24 hours in advance to cancel.This will allow us to offer the appointment time to another patient.   Multiple missed appointments may lead to dismissal from the clinic.

## 2022-04-13 NOTE — TELEPHONE ENCOUNTER
Patient calling, was seen by pain team today. She states that she was to follow-up with Dr. Patel, but can't get in to see her until mid-May. States that she could get a Cortisone injection through the pain team as early as next week. Patient is wondering if she can proceed with injection without seeing Dr. Patel, or if she needs appointment. Patient also wondering if she should continue taking oral prednisone. Please advise.     Ene Handy RN   [facility]

## 2022-04-13 NOTE — PROGRESS NOTES
Lili Procedure consult     Date of visit: 4/13/2022    Reason for consultation:    Primary Care Provider is Melanie Patel.  Pain medications are being prescribed by n/a.    Please see the [facility] health questionnaire which the patient completed and reviewed with me in detail.    Chief Complaint:    Chief Complaint   Patient presents with     Pain     Left leg for the swelling and lower back and right side of leg and hip.       MME prescribed prior to seeing patient:  Current MME:    Pain history:  Tracie Feliciano is a 77 year old female who first started having problems with pain rlbp radiating to her LE.   The pain started 1 yr ago   The pt denies any specific inciting event  The pain has gotten progressively worse   The pain varies in nature   The pain varies in severity  The pain was a very sharp   The pain was stabbing  The pain was worse with bending  The pain is worse with prolonged walking  Some benefit with meds  Some benefit with PHYSICAL THERAPY       Of note now she feels her pain is getting slightly better  Denies any weakness  Denies any recent falls  Denies any new incontinence     Pain sig affects quality of life  Pt is sleep better     Pt taking celebrex 1-2 times a day   acteminophen      Pain rating: intensity  Averages 7/10 on a 0-10 scale.  *    Current treatments include:  Acetaminophen   clebrex     Previous medication treatments included:      Other treatments have included:  Tracie Feliciano has not been seen at a pain clinic in the past.    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     Osteopenia      Pelvic arthropathy      Primary osteoarthritis of both hips      Stress incontinence      Urge incontinence of urine 08/01/2018     Venous (peripheral) insufficiency 06/06/2016     Vitamin D deficiency      Past Surgical History:  Past Surgical History:   Procedure Laterality Date     C/SECTION, LOW TRANSVERSE  4/2/80     COLONOSCOPY  6/21/2012    Procedure: COLONOSCOPY;  COLONOSCOPY, SCREEN, PREVIOUS POLYP;  Surgeon: Maverick Maradiaga MD;  Location: [facility] OR     EYE SURGERY Bilateral 2017    cataract     HYSTERECTOMY, PAP NO LONGER INDICATED  9/30/09    BSO      LUMPECTOMY BREAST Left 08/18/2017     Medications:  Current Outpatient Medications   Medication Sig Dispense Refill     acetaminophen (TYLENOL) 500 MG tablet Take 1-2 tablets (500-1,000 mg) by mouth every 8 hours as needed for pain       celecoxib (CELEBREX) 200 MG capsule Take 1 capsule (200 mg) by mouth 2 times daily as needed for moderate pain  0     Cholecalciferol (VITAMIN D) 2000 UNITS tablet Take 2,000 Units by mouth daily 2 tablets daily       FEROSUL 325 (65 Fe) MG tablet TAKE 1 TABLET BY MOUTH DAILY WITH BREAKFAST 90 tablet 0     hydrochlorothiazide (HYDRODIURIL) 25 MG tablet Take 1 tablet (25 mg) by mouth daily 90 tablet 3     losartan (COZAAR) 50 MG tablet TAKE 1 TABLET BY MOUTH EVERY DAY 90 tablet 2     simvastatin (ZOCOR) 40 MG tablet Take 1 tablet (40 mg) by mouth daily 90 tablet 3     tamoxifen (NOLVADEX) 20 MG tablet Take 20 mg by mouth daily       timolol (TIMOPTIC) 0.5 % ophthalmic solution PLACE 1 DROP INTO THE LEFT EYE QAM  3     Allergies:     Allergies   Allergen Reactions     Aspirin      Reactive gastropathy     Contrast Dye      sneezing     Lisinopril Cough     Social History:    History of chemical  dependency treatment: n    Family history:  Family History   Problem Relation Age of Onset     Cancer Mother         d. pelvic     C.A.D. Mother         ?     C.A.D. Father 79        MI, d.     Alzheimer Disease Father      Gastrointestinal Disease Father         PUD     Cancer Maternal Aunt         GI?     Diabetes No family hx of      Family history of headaches: n    Review of Systems:  Skin: negative  Eyes: negative  Ears/Nose/Throat: negative  Respiratory: No shortness of breath, dyspnea on exertion, cough, or hemoptysis  Cardiovascular: negative  Gastrointestinal: negative  Genitourinary: negative  Musculoskeletal: negative  Neurologic: negative  Psychiatric: negative  Hematologic/Lymphatic/Immunologic: negative  Endocrine: negative    Physical Exam:  Vitals:    04/13/22 1001   BP: 121/81   Pulse: 65     Exam:  Constitutional: healthy, alert and no distress  + mayo  Head: normocephalic. Atraumatic.   Eyes: no redness or jaundice noted     Cardiovascular: Negative JVD  Respiratory: Speaking in full sentences no accessory muscles use     Skin: no suspicious lesions or rashes  Psychiatric: mentation appears normal and affect normal/bright    Musculoskeletal exam:  Gait/Station/Posture: wnl  Cervical spine: ROMwnl       Thoracic spine:  Normal     Lumbar spine:     ROM: wnl   Myofascial tenderness:  mild   Ac's: mild               Straight leg exam: mild on the right      Neurologic exam:    Motor:  5/5 UE and LE strength  Reflexes:        Achilles:  +2    Sensory:  (upper and lower extremities):   Light touch: normal    Allodynia: absent    Dysethesia: absent    Hyperalgesia: absent     Diagnostic tests:    There is normal alignment of the lumbar vertebrae. Vertebral body  heights of the lumbar spine are normal. There is a T1 and T2  hyperintense lesion in the left side of the L1 vertebral body also  involving the left L1 pedicle consistent with a benign hemangioma.  There is a T1 and T2 hyperintense nodule in  the posterior aspect of  the T12 vertebral body also consistent with a benign hemangioma.  Marrow signal throughout the lumbar vertebrae is otherwise within  normal limits. There is no evidence for fracture or pathologic bony  lesion of the lumbar spine.      There is loss of disc height, disc desiccation and posterior disc  bulging/herniation to varying degrees at all levels of the lumbar  spine.      The tip of the conus medullaris is at the L1-L2 level which is within  normal limits. There is no evidence for intrathecal abnormality.      Level by level:      T12-L1: Normal disc height and signal. No herniation. Mild facet  arthropathy bilaterally. No spinal canal stenosis. No foraminal  stenosis on either side.      L1-L2: Loss of disc height, disc desiccation and mild circumferential  disc bulging. No herniation. Moderate facet arthropathy bilaterally.  No spinal canal stenosis. No foraminal stenosis on either side.     L2-L3: Loss of disc height, disc desiccation and mild circumferential  disc bulging. No herniation. Moderate facet arthropathy bilaterally.  No spinal canal stenosis. No foraminal stenosis on either side.     L3-L4: Loss of disc height, disc desiccation and mild circumferential  disc bulging. No herniation. Moderate facet arthropathy bilaterally.  Ligamentum flavum thickening bilaterally. No spinal canal stenosis. No  foraminal stenosis on either side.     L4-L5: Loss of disc height, disc desiccation and circumferential disc  bulging with a superimposed small right paracentral disc herniation  (protrusion). Moderate facet arthropathy bilaterally. Ligamentum  flavum thickening bilaterally. Mild spinal canal narrowing. Mild  bilateral neural foraminal narrowing.     L5-S1: Loss of disc height, disc desiccation and mild circumferential  disc bulging. No herniation. Mild facet arthropathy bilaterally. No  spinal canal stenosis. No foraminal stenosis on either side.                                                                       IMPRESSION:  1. Diffuse degenerative change of the lumbar spine as detailed above.  2. No significant spinal canal or neural foraminal stenosis at any  level of the lumbar spine. However, the right paracentral disc  herniation arising from the L4-L5 disc may contact the descending  right L5 nerve root in the right lateral recess of the spinal canal  possibly resulting in irritation or impingement            Assessment/Plan:  Tracie Felicinao is a 77 year old female who presents with the complaints of Right sided LBP radiating to her LE   Tracie was seen today for pain.    Diagnoses and all orders for this visit:    Lumbar radiculopathy    Chronic midline low back pain with right-sided sciatica  -     Pain Management Referral    Chronic bilateral low back pain with right-sided sciatica  -     Pain Management Referral    Myofascial muscle pain         - Further procedures recommended:    - reasonable to consider L4-5, L5-S1 TRANSFORAMINAL EPIDURAL STEROID INJECTION    - Medication Management:    - reasonable to continue current regimen for now. Would consider low dose gabapentin long term instead of celebrex   - Physical Therapy: continue     - Follow up: pending decision on how to proceed       The total TIME spent on this patient on the day of the appointment was 30 minutes.   Time spent preparing to see the patient (reviewing records and tests)  Time spend face to face with the patient  Time spent ordering tests, medications, procedures and referrals  Time spent Referring and communicating with other healthcare professionals  Documenting clinical information in Epic    Mario Cano MD  [facility]  This note was created with voice recognition software, and while reviewed for accuracy, typos may remain.

## 2022-04-13 NOTE — TELEPHONE ENCOUNTER
She should not be taking oral steroid to my knowledge. I have made a referral for another injection - she should receive outreach.   Melanie Patel MD

## 2022-04-13 NOTE — TELEPHONE ENCOUNTER
Yes. I've sent refills  Signed Prescriptions:                        Disp   Refills    celecoxib (CELEBREX) 200 MG capsule        60 cap*11       Sig: Take 1 capsule (200 mg) by mouth 2 times daily as           needed for moderate pain  Authorizing Provider: AILYN FAJARDO

## 2022-05-18 ENCOUNTER — OFFICE VISIT (OUTPATIENT)
Dept: FAMILY MEDICINE | Facility: CLINIC | Age: 77
End: 2022-05-18
Payer: MEDICARE

## 2022-05-18 VITALS
DIASTOLIC BLOOD PRESSURE: 62 MMHG | OXYGEN SATURATION: 100 % | HEART RATE: 72 BPM | BODY MASS INDEX: 25.18 KG/M2 | SYSTOLIC BLOOD PRESSURE: 106 MMHG | WEIGHT: 149 LBS | TEMPERATURE: 98.7 F

## 2022-05-18 DIAGNOSIS — I87.2 VENOUS (PERIPHERAL) INSUFFICIENCY: Primary | ICD-10-CM

## 2022-05-18 DIAGNOSIS — Z12.11 SPECIAL SCREENING FOR MALIGNANT NEOPLASMS, COLON: ICD-10-CM

## 2022-05-18 DIAGNOSIS — M79.652 PAIN OF LEFT THIGH: ICD-10-CM

## 2022-05-18 DIAGNOSIS — H69.91 EUSTACHIAN TUBE DYSFUNCTION, RIGHT: ICD-10-CM

## 2022-05-18 PROBLEM — Z86.39 HISTORY OF IRON DEFICIENCY: Status: ACTIVE | Noted: 2021-09-14

## 2022-05-18 PROBLEM — M19.042 PRIMARY OSTEOARTHRITIS OF BOTH HANDS: Status: RESOLVED | Noted: 2022-05-18 | Resolved: 2022-05-18

## 2022-05-18 PROBLEM — M19.041 PRIMARY OSTEOARTHRITIS OF BOTH HANDS: Status: RESOLVED | Noted: 2022-05-18 | Resolved: 2022-05-18

## 2022-05-18 PROBLEM — N39.3 FEMALE STRESS INCONTINENCE: Status: RESOLVED | Noted: 2022-05-18 | Resolved: 2022-05-18

## 2022-05-18 PROBLEM — N39.3 FEMALE STRESS INCONTINENCE: Status: ACTIVE | Noted: 2022-05-18

## 2022-05-18 PROBLEM — M19.041 PRIMARY OSTEOARTHRITIS OF BOTH HANDS: Status: ACTIVE | Noted: 2022-05-18

## 2022-05-18 PROBLEM — M19.042 PRIMARY OSTEOARTHRITIS OF BOTH HANDS: Status: ACTIVE | Noted: 2022-05-18

## 2022-05-18 PROCEDURE — 99214 OFFICE O/P EST MOD 30 MIN: CPT | Performed by: FAMILY MEDICINE

## 2022-05-18 ASSESSMENT — PAIN SCALES - GENERAL: PAINLEVEL: NO PAIN (0)

## 2022-05-18 NOTE — PROGRESS NOTES
Assessment & Plan     (I87.2) Venous (peripheral) insufficiency  (primary encounter diagnosis)  Comment: chronic   Plan: Lower extremity elevation, low-salt diet, compression stockings, and call or return to clinic as needed if these symptoms worsen or fail to improve as anticipated. Offered vascular referral.     (M79.652) Pain of left thigh  Comment: Differential diagnoses would include: degenerative joint disease, radiculopathy, known hip degenerative joint disease   Plan: recommended left knee XR which she'd like to postpone until next visit; follow-up as needed     (H69.81) Eustachian tube dysfunction, right  Comment: normal examination   Plan: Reassurance provided.  Return to clinic for persistence, recurrence, or new symptoms as needed.     (Z12.11) Special screening for malignant neoplasms, colon  Plan: Fecal colorectal cancer screen (FIT)                     See Patient Instructions    Return in about 4 months (around 9/15/2022) for Wellness visit (fasting labs up to one week prior).    Melanie Patel MD  [employer] EDGAR Hodges is a 77 year old who presents for the following health issues  accompanied by her spouse.    HPI   Tracie Feliciano is a 77 year old female who presents with follow-up of chronic left venous insufficiency. Symptom onset of edema has been unchanged for a time period of years. Severity is described as moderate. Course of her symptoms over time is unchanged. She wonders if she needs to use compression stockings. She wants to stop taking celebrex but pharmacy keeps sending it.     She also has left leg pain and right ear congestion.     Review of Systems   CONSTITUTIONAL: NEGATIVE for fever, chills, change in weight  INTEGUMENTARY/SKIN: NEGATIVE for worrisome rashes, moles or lesions  ENT/MOUTH: NEGATIVE for ear, mouth and throat problems  RESP: NEGATIVE for significant cough or SOB  CV: left lower extremity edema  MUSCULOSKELETAL: left thigh pain, just  above knee   NEURO: NEGATIVE for weakness, dizziness or paresthesias      Objective    /62 (BP Location: Right arm, Patient Position: Sitting, Cuff Size: Adult Regular)   Pulse 72   Temp 98.7  F (37.1  C) (Oral)   Wt 67.6 kg (149 lb)   SpO2 100%   BMI 25.18 kg/m    Body mass index is 25.18 kg/m .  Physical Exam   GENERAL: alert and no distress  EYES: Eyes grossly normal to inspection, PERRL and conjunctivae and sclerae normal  HENT: ear canals and TM's normal, nose and mouth without ulcers or lesions  NECK: no adenopathy, no asymmetry, masses, or scars and thyroid normal to palpation  RESP: lungs clear to auscultation - no rales, rhonchi or wheezes  CV: regular rates and rhythm and normal S1 S2, no S3 or S4  MS: woody left lower extremity edema   NEURO: Normal strength and tone, mentation intact and speech normal  PSYCH: mentation appears normal, affect normal/bright    Lab on 03/21/2022   Component Date Value Ref Range Status     Color Urine 03/21/2022 Yellow  Colorless, Straw, Light Yellow, Yellow Final     Appearance Urine 03/21/2022 Clear  Clear Final     Glucose Urine 03/21/2022 Negative  Negative mg/dL Final     Bilirubin Urine 03/21/2022 Negative  Negative Final     Ketones Urine 03/21/2022 Negative  Negative mg/dL Final     Specific Gravity Urine 03/21/2022 1.015  1.003 - 1.035 Final     Blood Urine 03/21/2022 Negative  Negative Final     pH Urine 03/21/2022 5.5  5.0 - 7.0 Final     Protein Albumin Urine 03/21/2022 Negative  Negative mg/dL Final     Urobilinogen Urine 03/21/2022 0.2  0.2, 1.0 E.U./dL Final     Nitrite Urine 03/21/2022 Negative  Negative Final     Leukocyte Esterase Urine 03/21/2022 Negative  Negative Final     RBC Urine 03/21/2022 0-2  0-2 /HPF /HPF Final     WBC Urine 03/21/2022 0-5  0-5 /HPF /HPF Final     Squamous Epithelials Urine 03/21/2022 Few (A) None Seen /LPF Final     No results found for this or any previous visit (from the past 24 hour(s)).

## 2022-05-18 NOTE — LETTER
May 23, 2022      Tracie Feliciano  [address]  [place] [address]        Dear ,    We are writing to inform you of your test results.    Your FIT card test results are normal. This means you are unlikely to have colon cancer. This test for low risk individuals should be repeated yearly until age 75. Request your FIT card from the clinic or at the time of an appointment before it is due next year.         Resulted Orders   Fecal colorectal cancer screen (FIT)   Result Value Ref Range    Occult Blood Screen FIT Negative Negative       If you have any questions or concerns, please call the clinic at the number listed above.       Sincerely,      Melanie Patel MD/garcia

## 2022-05-19 ENCOUNTER — APPOINTMENT (OUTPATIENT)
Dept: LAB | Facility: CLINIC | Age: 77
End: 2022-05-19
Payer: MEDICARE

## 2022-05-19 PROCEDURE — 82274 ASSAY TEST FOR BLOOD FECAL: CPT | Performed by: FAMILY MEDICINE

## 2022-05-21 LAB — HEMOCCULT STL QL IA: NEGATIVE

## 2022-05-23 NOTE — RESULT ENCOUNTER NOTE
Your FIT card test results are normal. This means you are unlikely to have colon cancer. This test for low risk individuals should be repeated yearly until age 75. Request your FIT card from the clinic or at the time of an appointment before it is due next year.     Melanie Patel MD

## 2022-05-24 ENCOUNTER — TELEPHONE (OUTPATIENT)
Dept: FAMILY MEDICINE | Facility: CLINIC | Age: 77
End: 2022-05-24
Payer: MEDICARE

## 2022-05-24 NOTE — TELEPHONE ENCOUNTER
Patient called stated that Tylenol does not always help with pain and is wondering if ok to use Advil PRN for pain.  Advised this is fine but to use PRN.  Explained that regular use can increase risk of GI bleed/ulcers and kidney problems.  Patient verbalized understanding.    Jeffery Talley RN  [facility]

## 2022-06-17 DIAGNOSIS — I10 HYPERTENSION GOAL BP (BLOOD PRESSURE) < 140/90: ICD-10-CM

## 2022-06-19 RX ORDER — HYDROCHLOROTHIAZIDE 25 MG/1
TABLET ORAL
Qty: 90 TABLET | Refills: 0 | Status: SHIPPED | OUTPATIENT
Start: 2022-06-19 | End: 2022-09-21

## 2022-06-19 NOTE — TELEPHONE ENCOUNTER
Medication is being filled for 1 time refill only due to:  Patient needs to be seen because need appt.   Gabriella Beal RN

## 2022-06-20 DIAGNOSIS — M54.41 ACUTE RIGHT-SIDED LOW BACK PAIN WITH RIGHT-SIDED SCIATICA: ICD-10-CM

## 2022-06-20 DIAGNOSIS — M16.0 PRIMARY OSTEOARTHRITIS OF BOTH HIPS: ICD-10-CM

## 2022-06-23 RX ORDER — CELECOXIB 200 MG/1
CAPSULE ORAL
Qty: 90 CAPSULE | Refills: 0 | OUTPATIENT
Start: 2022-06-23

## 2022-06-23 NOTE — TELEPHONE ENCOUNTER
Patient not taking. Declining request and closing encounter at this time.     Ene Handy RN   [facility]

## 2022-06-23 NOTE — TELEPHONE ENCOUNTER
Called and spoke with patient. Patient stated that she is currently not taking Celebrex.    Rachel Lima CMA

## 2022-07-15 DIAGNOSIS — E78.5 HYPERLIPIDEMIA WITH TARGET LDL LESS THAN 130: ICD-10-CM

## 2022-07-15 NOTE — TELEPHONE ENCOUNTER
"Pt has appt 9/21/22    Requested Prescriptions   Pending Prescriptions Disp Refills     simvastatin (ZOCOR) 40 MG tablet [Pharmacy Med Name: SIMVASTATIN 40MG TABLETS] 90 tablet 3     Sig: TAKE 1 TABLET(40 MG) BY MOUTH DAILY       Statins Protocol Failed - 7/15/2022  3:11 AM        Failed - LDL on file in past 12 months     Recent Labs   Lab Test 07/06/21  1157   LDL 72             Passed - No abnormal creatine kinase in past 12 months     No lab results found.             Passed - Recent (12 mo) or future (30 days) visit within the authorizing provider's specialty     Patient has had an office visit with the authorizing provider or a provider within the authorizing providers department within the previous 12 mos or has a future within next 30 days. See \"Patient Info\" tab in inbasket, or \"Choose Columns\" in Meds & Orders section of the refill encounter.              Passed - Medication is active on med list        Passed - Patient is age 18 or older        Passed - No active pregnancy on record        Passed - No positive pregnancy test in past 12 months           Pura Wynn RN  [facility]     "

## 2022-07-16 RX ORDER — SIMVASTATIN 40 MG
TABLET ORAL
Qty: 90 TABLET | Refills: 0 | Status: SHIPPED | OUTPATIENT
Start: 2022-07-16 | End: 2022-09-21

## 2022-09-14 ENCOUNTER — LAB (OUTPATIENT)
Dept: LAB | Facility: CLINIC | Age: 77
End: 2022-09-14
Payer: MEDICARE

## 2022-09-14 DIAGNOSIS — Z13.220 SCREENING FOR HYPERLIPIDEMIA: ICD-10-CM

## 2022-09-14 DIAGNOSIS — I10 HYPERTENSION GOAL BP (BLOOD PRESSURE) < 140/90: ICD-10-CM

## 2022-09-14 PROBLEM — E87.5 HYPERKALEMIA: Status: RESOLVED | Noted: 2022-09-14 | Resolved: 2022-09-14

## 2022-09-14 PROBLEM — E87.5 HYPERKALEMIA: Status: ACTIVE | Noted: 2022-09-14

## 2022-09-14 LAB
ANION GAP SERPL CALCULATED.3IONS-SCNC: 6 MMOL/L (ref 3–14)
BUN SERPL-MCNC: 11 MG/DL (ref 7–30)
CALCIUM SERPL-MCNC: 10.4 MG/DL (ref 8.5–10.1)
CHLORIDE BLD-SCNC: 101 MMOL/L (ref 94–109)
CHOLEST SERPL-MCNC: 149 MG/DL
CO2 SERPL-SCNC: 29 MMOL/L (ref 20–32)
CREAT SERPL-MCNC: 0.55 MG/DL (ref 0.52–1.04)
FASTING STATUS PATIENT QL REPORTED: YES
GFR SERPL CREATININE-BSD FRML MDRD: >90 ML/MIN/1.73M2
GLUCOSE BLD-MCNC: 99 MG/DL (ref 70–99)
HDLC SERPL-MCNC: 58 MG/DL
LDLC SERPL CALC-MCNC: 66 MG/DL
NONHDLC SERPL-MCNC: 91 MG/DL
POTASSIUM BLD-SCNC: 3.7 MMOL/L (ref 3.4–5.3)
SODIUM SERPL-SCNC: 136 MMOL/L (ref 133–144)
TRIGL SERPL-MCNC: 123 MG/DL

## 2022-09-14 PROCEDURE — 80061 LIPID PANEL: CPT

## 2022-09-14 PROCEDURE — 80048 BASIC METABOLIC PNL TOTAL CA: CPT

## 2022-09-14 PROCEDURE — 36415 COLL VENOUS BLD VENIPUNCTURE: CPT

## 2022-09-21 ENCOUNTER — OFFICE VISIT (OUTPATIENT)
Dept: FAMILY MEDICINE | Facility: CLINIC | Age: 77
End: 2022-09-21
Payer: MEDICARE

## 2022-09-21 VITALS
SYSTOLIC BLOOD PRESSURE: 122 MMHG | WEIGHT: 151 LBS | HEART RATE: 68 BPM | OXYGEN SATURATION: 97 % | TEMPERATURE: 98.1 F | BODY MASS INDEX: 25.16 KG/M2 | HEIGHT: 65 IN | DIASTOLIC BLOOD PRESSURE: 70 MMHG

## 2022-09-21 DIAGNOSIS — C50.912 MALIGNANT NEOPLASM OF LEFT FEMALE BREAST, UNSPECIFIED ESTROGEN RECEPTOR STATUS, UNSPECIFIED SITE OF BREAST (H): ICD-10-CM

## 2022-09-21 DIAGNOSIS — Z86.39 HISTORY OF IRON DEFICIENCY: ICD-10-CM

## 2022-09-21 DIAGNOSIS — D23.9 DERMATOFIBROMA: ICD-10-CM

## 2022-09-21 DIAGNOSIS — L65.9 THINNING HAIR: ICD-10-CM

## 2022-09-21 DIAGNOSIS — I10 HYPERTENSION GOAL BP (BLOOD PRESSURE) < 140/90: ICD-10-CM

## 2022-09-21 DIAGNOSIS — Z00.00 ENCOUNTER FOR MEDICARE ANNUAL WELLNESS EXAM: Primary | ICD-10-CM

## 2022-09-21 DIAGNOSIS — E78.5 HYPERLIPIDEMIA WITH TARGET LDL LESS THAN 130: ICD-10-CM

## 2022-09-21 DIAGNOSIS — M85.80 OSTEOPENIA, UNSPECIFIED LOCATION: ICD-10-CM

## 2022-09-21 DIAGNOSIS — E83.52 HYPERCALCEMIA: ICD-10-CM

## 2022-09-21 DIAGNOSIS — N39.41 URGE INCONTINENCE OF URINE: ICD-10-CM

## 2022-09-21 DIAGNOSIS — L98.9 SKIN LESION: ICD-10-CM

## 2022-09-21 LAB
BASOPHILS # BLD AUTO: 0 10E3/UL (ref 0–0.2)
BASOPHILS NFR BLD AUTO: 0 %
EOSINOPHIL # BLD AUTO: 0.1 10E3/UL (ref 0–0.7)
EOSINOPHIL NFR BLD AUTO: 1 %
ERYTHROCYTE [DISTWIDTH] IN BLOOD BY AUTOMATED COUNT: 12.5 % (ref 10–15)
HCT VFR BLD AUTO: 37 % (ref 35–47)
HGB BLD-MCNC: 12.8 G/DL (ref 11.7–15.7)
LYMPHOCYTES # BLD AUTO: 1.8 10E3/UL (ref 0.8–5.3)
LYMPHOCYTES NFR BLD AUTO: 27 %
MCH RBC QN AUTO: 31.1 PG (ref 26.5–33)
MCHC RBC AUTO-ENTMCNC: 34.6 G/DL (ref 31.5–36.5)
MCV RBC AUTO: 90 FL (ref 78–100)
MONOCYTES # BLD AUTO: 0.4 10E3/UL (ref 0–1.3)
MONOCYTES NFR BLD AUTO: 6 %
NEUTROPHILS # BLD AUTO: 4.5 10E3/UL (ref 1.6–8.3)
NEUTROPHILS NFR BLD AUTO: 66 %
PLATELET # BLD AUTO: 208 10E3/UL (ref 150–450)
PTH-INTACT SERPL-MCNC: 51 PG/ML (ref 15–65)
RBC # BLD AUTO: 4.12 10E6/UL (ref 3.8–5.2)
WBC # BLD AUTO: 6.9 10E3/UL (ref 4–11)

## 2022-09-21 PROCEDURE — 85025 COMPLETE CBC W/AUTO DIFF WBC: CPT | Performed by: FAMILY MEDICINE

## 2022-09-21 PROCEDURE — 90662 IIV NO PRSV INCREASED AG IM: CPT | Performed by: FAMILY MEDICINE

## 2022-09-21 PROCEDURE — 83970 ASSAY OF PARATHORMONE: CPT | Performed by: FAMILY MEDICINE

## 2022-09-21 PROCEDURE — G0439 PPPS, SUBSEQ VISIT: HCPCS | Performed by: FAMILY MEDICINE

## 2022-09-21 PROCEDURE — G0008 ADMIN INFLUENZA VIRUS VAC: HCPCS | Performed by: FAMILY MEDICINE

## 2022-09-21 PROCEDURE — 82310 ASSAY OF CALCIUM: CPT | Performed by: FAMILY MEDICINE

## 2022-09-21 PROCEDURE — 99214 OFFICE O/P EST MOD 30 MIN: CPT | Mod: 25 | Performed by: FAMILY MEDICINE

## 2022-09-21 PROCEDURE — 82306 VITAMIN D 25 HYDROXY: CPT | Performed by: FAMILY MEDICINE

## 2022-09-21 PROCEDURE — 36415 COLL VENOUS BLD VENIPUNCTURE: CPT | Performed by: FAMILY MEDICINE

## 2022-09-21 PROCEDURE — 84443 ASSAY THYROID STIM HORMONE: CPT | Performed by: FAMILY MEDICINE

## 2022-09-21 RX ORDER — HYDROCHLOROTHIAZIDE 25 MG/1
25 TABLET ORAL DAILY
Qty: 90 TABLET | Refills: 3 | Status: SHIPPED | OUTPATIENT
Start: 2022-09-21 | End: 2022-09-22 | Stop reason: SINTOL

## 2022-09-21 RX ORDER — SIMVASTATIN 40 MG
40 TABLET ORAL DAILY
Qty: 90 TABLET | Refills: 3 | Status: SHIPPED | OUTPATIENT
Start: 2022-09-21 | End: 2023-10-11

## 2022-09-21 RX ORDER — LOSARTAN POTASSIUM 50 MG/1
50 TABLET ORAL DAILY
Qty: 90 TABLET | Refills: 3 | Status: SHIPPED | OUTPATIENT
Start: 2022-09-21 | End: 2023-09-21

## 2022-09-21 RX ORDER — FERROUS SULFATE 325(65) MG
325 TABLET ORAL
Qty: 90 TABLET | Refills: 3 | Status: SHIPPED | OUTPATIENT
Start: 2022-09-21

## 2022-09-21 ASSESSMENT — ENCOUNTER SYMPTOMS
PALPITATIONS: 0
DYSURIA: 0
ARTHRALGIAS: 1
WEAKNESS: 0
CHILLS: 0
ABDOMINAL PAIN: 0
COUGH: 0
PARESTHESIAS: 0
NAUSEA: 0
EYE PAIN: 0
JOINT SWELLING: 0
SHORTNESS OF BREATH: 0
NERVOUS/ANXIOUS: 0
SORE THROAT: 0
DIZZINESS: 0
HEMATOCHEZIA: 0
HEARTBURN: 0
FEVER: 0
MYALGIAS: 1
HEADACHES: 0
BREAST MASS: 0
DIARRHEA: 0
FREQUENCY: 1
HEMATURIA: 0
CONSTIPATION: 0

## 2022-09-21 ASSESSMENT — ACTIVITIES OF DAILY LIVING (ADL): CURRENT_FUNCTION: NO ASSISTANCE NEEDED

## 2022-09-21 NOTE — PROGRESS NOTES
"SUBJECTIVE:   Tracie is a 77 year old  who presents for Preventive Visit.    Patient has been advised of split billing requirements and indicates understanding: Yes  Are you in the first 12 months of your Medicare coverage?  No    She has a skin lesion on her right thigh. Hypercholesterolemia well controlled with current treatment plan without side effects. Hypertension well controlled on current medications without side effects, chest pain, or dyspnea. She has thinning hair and incontinence.     Healthy Habits:     In general, how would you rate your overall health?  Excellent    Frequency of exercise:  2-3 days/week    Duration of exercise:  15-30 minutes    Do you usually eat at least 4 servings of fruit and vegetables a day, include whole grains    & fiber and avoid regularly eating high fat or \"junk\" foods?  Yes    Taking medications regularly:  Yes    Medication side effects:  None and Muscle aches    Ability to successfully perform activities of daily living:  No assistance needed    Home Safety:  No safety concerns identified    Hearing Impairment:  No hearing concerns    In the past 6 months, have you been bothered by leaking of urine? Yes    In general, how would you rate your overall mental or emotional health?  Excellent      PHQ-2 Total Score: 0    Additional concerns today:  No    Do you feel safe in your environment? Yes    Have you ever done Advance Care Planning? (For example, a Health Directive, POLST, or a discussion with a medical provider or your loved ones about your wishes): No, advance care planning information given to patient to review.  Advanced care planning was discussed at today's visit.       Fall risk  Fallen 2 or more times in the past year?: No  Any fall with injury in the past year?: No    Cognitive Screening   1) Repeat 3 items (Leader, Season, Table)    2) Clock draw: NORMAL  3) 3 item recall: Recalls 2 objects   Results: NORMAL clock, 1-2 items recalled: COGNITIVE " IMPAIRMENT LESS LIKELY    Mini-CogTM Copyright DAGOBERTO Temple. Licensed by the author for use in [facility]; reprinted with permission (alice@.[facility]). All rights reserved.      Do you have sleep apnea, excessive snoring or daytime drowsiness?: no    Reviewed and updated as needed this visit by clinical staff   Tobacco  Allergies  Meds  Problems  Med Hx  Surg Hx  Fam Hx            Reviewed and updated as needed this visit by Provider   Tobacco  Allergies  Meds  Problems  Med Hx  Surg Hx  Fam Hx           Social History     Tobacco Use     Smoking status: Former Smoker     Packs/day: 0.50     Years: 1.00     Pack years: 0.50     Types: Cigarettes     Quit date: 1967     Years since quittin.7     Smokeless tobacco: Never Used   Substance Use Topics     Alcohol use: Not Currently     Alcohol/week: 2.5 standard drinks     Types: 3 Standard drinks or equivalent per week         Alcohol Use 2022   Prescreen: >3 drinks/day or >7 drinks/week? No   Prescreen: >3 drinks/day or >7 drinks/week? -       Current providers sharing in care for this patient include:   Patient Care Team:  Melanie Patel MD as PCP - General  Melanie Patel MD as Assigned PCP  Daly Moraes PA-C as Physician Assistant (Dermatology)    The following health maintenance items are reviewed in Epic and correct as of today:  Health Maintenance   Topic Date Due     COVID-19 Vaccine (4 - Booster for Radu series) 2022     MAMMO SCREENING  2022     COLORECTAL CANCER SCREENING  2023     BMP  2023     LIPID  2023     MEDICARE ANNUAL WELLNESS VISIT  2023     ANNUAL REVIEW OF HM ORDERS  2023     FALL RISK ASSESSMENT  2023     DEXA  2023     ADVANCE CARE PLANNING  2027     DTAP/TDAP/TD IMMUNIZATION (4 - Td or Tdap) 2031     HEPATITIS C SCREENING  Completed     PHQ-2 (once per calendar year)  Completed     INFLUENZA VACCINE  Completed      Pneumococcal Vaccine: 65+ Years  Completed     ZOSTER IMMUNIZATION  Completed     IPV IMMUNIZATION  Aged Out     MENINGITIS IMMUNIZATION  Aged Out     HEPATITIS B IMMUNIZATION  Aged Out     Patient Active Problem List   Diagnosis     Allergic rhinitis     Hyperlipidemia with target LDL less than 130     Hypertension goal BP (blood pressure) < 140/90     Osteopenia     Advanced directives, counseling/discussion     Cataract     Venous (peripheral) insufficiency     Hypercalcemia     Urge incontinence of urine     Malignant neoplasm of left female breast, unspecified estrogen receptor status, unspecified site of breast (H)     Primary osteoarthritis of both hips     History of iron deficiency     Lumbar disc herniation with radiculopathy     Primary osteoarthritis of right hand     Past Surgical History:   Procedure Laterality Date     C/SECTION, LOW TRANSVERSE  80     COLONOSCOPY  2012    Procedure: COLONOSCOPY;  COLONOSCOPY, SCREEN, PREVIOUS POLYP;  Surgeon: Mavercik Maradiaga MD;  Location: [facility] OR     EYE SURGERY Bilateral 2017    cataract     HYSTERECTOMY, PAP NO LONGER INDICATED  09    BSO      LUMPECTOMY BREAST Left 2017       Social History     Tobacco Use     Smoking status: Former Smoker     Packs/day: 0.50     Years: 1.00     Pack years: 0.50     Types: Cigarettes     Quit date: 1967     Years since quittin.7     Smokeless tobacco: Never Used   Substance Use Topics     Alcohol use: Not Currently     Alcohol/week: 2.5 standard drinks     Types: 3 Standard drinks or equivalent per week     Family History   Problem Relation Age of Onset     Cancer Mother         d. pelvic     C.A.D. Mother         ?     C.A.D. Father 79        MI, d.     Alzheimer Disease Father      Gastrointestinal Disease Father         PUD     Cancer Maternal Aunt         GI?     Diabetes No family hx of          Current Outpatient Medications   Medication Sig Dispense Refill     Cholecalciferol (VITAMIN  D) 2000 UNITS tablet Take 2,000 Units by mouth daily 2 tablets daily       ferrous sulfate (FEROSUL) 325 (65 Fe) MG tablet Take 1 tablet (325 mg) by mouth daily (with breakfast) 90 tablet 3     hydrochlorothiazide (HYDRODIURIL) 25 MG tablet Take 1 tablet (25 mg) by mouth daily 90 tablet 3     losartan (COZAAR) 50 MG tablet Take 1 tablet (50 mg) by mouth daily 90 tablet 3     simvastatin (ZOCOR) 40 MG tablet Take 1 tablet (40 mg) by mouth daily 90 tablet 3     tamoxifen (NOLVADEX) 20 MG tablet Take 20 mg by mouth daily       timolol (TIMOPTIC) 0.5 % ophthalmic solution PLACE 1 DROP INTO THE LEFT EYE QAM  3     Allergies   Allergen Reactions     Aspirin      Reactive gastropathy     Contrast Dye      sneezing     Lisinopril Cough         Mammogram Screening - Patient over age 75, has elected to continue with screening.  Pertinent mammograms are reviewed under the imaging tab.    Review of Systems   Constitutional: Negative for chills and fever.   HENT: Positive for congestion. Negative for ear pain, hearing loss and sore throat.    Eyes: Negative for pain and visual disturbance.   Respiratory: Negative for cough and shortness of breath.    Cardiovascular: Positive for peripheral edema. Negative for chest pain and palpitations.   Gastrointestinal: Negative for abdominal pain, constipation, diarrhea, heartburn, hematochezia and nausea.   Breasts:  Negative for tenderness, breast mass and discharge.   Genitourinary: Positive for frequency and urgency. Negative for dysuria, genital sores, hematuria, pelvic pain, vaginal bleeding and vaginal discharge.   Musculoskeletal: Positive for arthralgias and myalgias. Negative for joint swelling.   Skin: Negative for rash.   Neurological: Negative for dizziness, weakness, headaches and paresthesias.   Psychiatric/Behavioral: Negative for mood changes. The patient is not nervous/anxious.          OBJECTIVE:   /70 (BP Location: Right arm, Patient Position: Sitting, Cuff Size:  "Adult Regular)   Pulse 68   Temp 98.1  F (36.7  C) (Oral)   Ht 1.638 m (5' 4.5\")   Wt 68.5 kg (151 lb)   SpO2 97%   BMI 25.52 kg/m   Estimated body mass index is 25.52 kg/m  as calculated from the following:    Height as of this encounter: 1.638 m (5' 4.5\").    Weight as of this encounter: 68.5 kg (151 lb).  Physical Exam  GENERAL: healthy, alert and no distress  EYES: Eyes grossly normal to inspection, PERRL and conjunctivae and sclerae normal  NECK: no adenopathy, no asymmetry, masses, or scars and thyroid normal to palpation  RESP: lungs clear to auscultation - no rales, rhonchi or wheezes  CV: regular rates and rhythm and normal S1 S2, no S3 or S4  MS: normal gait, bilateral woody edema   SKIN: left lower extremity dermatofibroma, scattered spider veins, and left medial thigh horny scaly papule   NEURO: Normal strength and tone, mentation intact and speech normal  PSYCH: mentation appears normal, affect normal/bright    Diagnostic Test Results:  Labs reviewed in Epic    ASSESSMENT / PLAN:   (Z00.00) Encounter for Medicare annual wellness exam  (primary encounter diagnosis)    (L98.9) Skin lesion  Comment: right thigh; Differential diagnoses would include: SCC   Plan: Adult Dermatology Referral           (D23.9) Dermatofibroma  Comment: left lower extremity   Plan: The patient is reassured that these symptoms do not appear to represent a serious or threatening condition.     (E78.5) Hyperlipidemia with target LDL less than 130  Comment: Well controlled with medications without side effects.   Plan: simvastatin (ZOCOR) 40 MG tablet, OFFICE/OUTPT         VISIT,EST,LEVL IV          (I10) Hypertension goal BP (blood pressure) < 140/90  Comment: blood pressure controlled   Plan: hydrochlorothiazide (HYDRODIURIL) 25 MG tablet,        losartan (COZAAR) 50 MG tablet, OFFICE/OUTPT         VISIT,EST,LEVL IV        Consider hypercalcemia as side effect to hydrochlorothiazide - if persistent, plan medication change "     (M85.80) Osteopenia, unspecified location  Plan: Vitamin D Deficiency, Parathyroid Hormone         Intact        Continue vitamin D supplement, regular exercise, falls precautions and DEXA bone scan every 2 - 3 years.     (E83.52) Hypercalcemia  Comment: see above   Plan: OFFICE/OUTPT VISIT,EST,LEVL IV, Calcium,         Vitamin D Deficiency, Parathyroid Hormone         Intact          (L65.9) Thinning hair  Plan: TSH with free T4 reflex, CBC with platelets and        differential        She may discuss with dermatology and/or try over-the-counter Rogaine     (N39.41) Urge incontinence of urine  Plan: discussed treatment options and use of disposable products     (Z86.39) History of iron deficiency  Comment: prior GI work up; chronic, recurrent; plan for indefinite iron supplementation   Plan: ferrous sulfate (FEROSUL) 325 (65 Fe) MG         tablet, OFFICE/OUTPT VISIT,EST,LEVL IV, CBC         with platelets and differential          (C50.912) Malignant neoplasm of left female breast, unspecified estrogen receptor status, unspecified site of breast (H)  Plan: OFFICE/OUTPT VISIT,EST,LEVL IV        Continue medication under care of oncology and regular mammography       Patient has been advised of split billing requirements and indicates understanding: Yes    COUNSELING:  Reviewed preventive health counseling, as reflected in patient instructions  Special attention given to:       Regular exercise       Healthy diet/nutrition       Bladder control       Fall risk prevention       Osteoporosis prevention/bone health       Colon cancer screening       The 10-year ASCVD risk score (Justyna CLEARY Jr., et al., 2013) is: 18.8%    Values used to calculate the score:      Age: 77 years      Sex: Female      Is Non- : No      Diabetic: No      Tobacco smoker: No      Systolic Blood Pressure: 122 mmHg      Is BP treated: Yes      HDL Cholesterol: 58 mg/dL      Total Cholesterol: 149 mg/dL    Estimated body  "mass index is 25.52 kg/m  as calculated from the following:    Height as of this encounter: 1.638 m (5' 4.5\").    Weight as of this encounter: 68.5 kg (151 lb).        She reports that she quit smoking about 55 years ago. Her smoking use included cigarettes. She has a 0.50 pack-year smoking history. She has never used smokeless tobacco.      Appropriate preventive services were discussed with this patient, including applicable screening as appropriate for cardiovascular disease, diabetes, osteopenia/osteoporosis, and glaucoma.  As appropriate for age/gender, discussed screening for colorectal cancer, prostate cancer, breast cancer, and cervical cancer. Checklist reviewing preventive services available has been given to the patient.    Reviewed patients plan of care and provided an AVS. The Basic Care Plan (routine screening as documented in Health Maintenance) for Tracie meets the Care Plan requirement. This Care Plan has been established and reviewed with the Patient.    Counseling Resources:  ATP IV Guidelines  Pooled Cohorts Equation Calculator  Breast Cancer Risk Calculator  Breast Cancer: Medication to Reduce Risk  FRAX Risk Assessment  ICSI Preventive Guidelines  Dietary Guidelines for Americans, 2010  USDA's MyPlate  ASA Prophylaxis  Lung CA Screening    Melanie Patel MD  [facility]    Identified Health Risks:    Information on urinary incontinence and treatment options given to patient.  "

## 2022-09-21 NOTE — PATIENT INSTRUCTIONS
Patient Education   Personalized Prevention Plan  You are due for the preventive services outlined below.  Your care team is available to assist you in scheduling these services.  If you have already completed any of these items, please share that information with your care team to update in your medical record.  Health Maintenance Due   Topic Date Due     COVID-19 Vaccine (4 - Booster for Radu series) 06/02/2022     Flu Vaccine (1) 09/01/2022     Mammogram  09/28/2022       Urinary Incontinence, Female (Adult)   Urinary incontinence means loss of bladder control. This problem affects many women, especially as they get older. If you have incontinence, you may be embarrassed to ask for help. But know that this problem can be treated.   Types of Incontinence  There are different types of incontinence. Two of the main types are described here. You can have more than one type.     Stress incontinence. With this type, urine leaks when pressure (stress) is put on the bladder. This may happen when you cough, sneeze, or laugh. Stress incontinence most often occurs because the pelvic floor muscles that support the bladder and urethra are weak. This can happen after pregnancy and vaginal childbirth or a hysterectomy. It can also be due to excess body weight or hormone changes.    Urge incontinence (also called overactive bladder). With this type, a sudden urge to urinate is felt often. This may happen even though there may not be much urine in the bladder. The need to urinate often during the night is common. Urge incontinence most often occurs because of bladder spasms. This may be due to bladder irritation or infection. Damage to bladder nerves or pelvic muscles, constipation, and certain medicines can also lead to urge incontinence.  Treatment depends on the cause. Further evaluation is needed to find the type you have. This will likely include an exam and certain tests. Based on the results, you and your healthcare  provider can then plan treatment. Until a diagnosis is made, the home care tips below can help ease symptoms.   Home care    Do pelvic floor muscle exercises, if they are prescribed. The pelvic floor muscles help support the bladder and urethra. Many women find that their symptoms improve when doing special exercises that strengthen these muscles. To do the exercises, contract the muscles you would use to stop your stream of urine. But do this when you re not urinating. Hold for 10 seconds, then relax. Repeat 10 to 20 times in a row, at least 3 times a day. Your healthcare provider may give you other instructions for how to do the exercises and how often.    Keep a bladder diary. This helps track how often and how much you urinate over a set period of time. Bring this diary with you to your next visit with the provider. The information can help your provider learn more about your bladder problem.    Lose weight, if advised to by your provider. Extra weight puts pressure on the bladder. Your provider can help you create a weight-loss plan that s right for you. This may include exercising more and making certain diet changes.    Don't have foods and drinks that may irritate the bladder. These can include alcohol and caffeinated drinks.    Quit smoking. Smoking and other tobacco use can lead to a long-term (chronic) cough that strains the pelvic floor muscles. Smoking may also damage the bladder and urethra. Talk with your provider about treatments or methods you can use to quit smoking.    If drinking large amounts of fluid makes you have symptoms, you may be advised to limit your fluid intake. You may also be advised to drink most of your fluids during the day and to limit fluids at night.    If you re worried about urine leakage or accidents, you may wear absorbent pads to catch urine. Change the pads often. This helps reduce discomfort. It may also reduce the risk of skin or bladder infections.    Follow-up  care  Follow up with your healthcare provider, or as directed. It may take some to find the right treatment for your problem. But healthy lifestyle changes can be made right away. These include such things as exercising on a regular basis, eating a healthy diet, losing weight (if needed), and quitting smoking. Your treatment plan may include special therapies or medicines. Certain procedures or surgery may also be options. Talk about any questions you have with your provider.   When to seek medical advice  Call the healthcare provider right away if any of these occur:    Fever of 100.4 F (38 C) or higher, or as directed by your provider    Bladder pain or fullness    Belly swelling    Nausea or vomiting    Back pain    Weakness, dizziness, or fainting  Lindsay last reviewed this educational content on 1/1/2020 2000-2021 [employer]. All rights reserved. This information is not intended as a substitute for professional medical care. Always follow your healthcare professional's instructions.

## 2022-09-21 NOTE — LETTER
September 23, 2022        Tracie Feliciano  [address]  [place] [facility] [place] [facility] [address]      Dear ,    We are writing to inform you of your test results.    Blood count is Good   Calcium level is Borderline High   This could be die to your Blood Pressure medicine-Hydrochlorothiazide   You can discuss with Dr Patel about changing this when she is back next week   Thyroid is normal   Vitamin D is good       Resulted Orders   TSH with free T4 reflex   Result Value Ref Range    TSH 1.28 0.40 - 4.00 mU/L   Calcium   Result Value Ref Range    Calcium 10.6 (H) 8.5 - 10.1 mg/dL   Vitamin D Deficiency   Result Value Ref Range    Vitamin D, Total (25-Hydroxy) 37 20 - 75 ug/L    Narrative    Season, race, dietary intake, and treatment affect the concentration of 25-hydroxy-Vitamin D. Values may decrease during winter months and increase during summer months. Values 20-29 ug/L may indicate Vitamin D insufficiency and values <20 ug/L may indicate Vitamin D deficiency.    Vitamin D determination is routinely performed by an immunoassay specific for 25 hydroxyvitamin D3.  If an individual is on vitamin D2(ergocalciferol) supplementation, please specify 25 OH vitamin D2 and D3 level determination by LCMSMS test VITD23.     Parathyroid Hormone Intact   Result Value Ref Range    Parathyroid Hormone Intact 51 15 - 65 pg/mL    Narrative    This result was obtained with the Roche Elecsys PTH STAT assay.   This reference range differs from PTH assays used in other [facility] laboratories.   CBC with platelets and differential   Result Value Ref Range    WBC Count 6.9 4.0 - 11.0 10e3/uL    RBC Count 4.12 3.80 - 5.20 10e6/uL    Hemoglobin 12.8 11.7 - 15.7 g/dL    Hematocrit 37.0 35.0 - 47.0 %    MCV 90 78 - 100 fL    MCH 31.1 26.5 - 33.0 pg    MCHC 34.6 31.5 - 36.5 g/dL    RDW 12.5 10.0 - 15.0 %    Platelet Count 208 150 - 450 10e3/uL    % Neutrophils 66 %    % Lymphocytes 27 %    % Monocytes 6 %    % Eosinophils 1 %     % Basophils 0 %    Absolute Neutrophils 4.5 1.6 - 8.3 10e3/uL    Absolute Lymphocytes 1.8 0.8 - 5.3 10e3/uL    Absolute Monocytes 0.4 0.0 - 1.3 10e3/uL    Absolute Eosinophils 0.1 0.0 - 0.7 10e3/uL    Absolute Basophils 0.0 0.0 - 0.2 10e3/uL       If you have any questions or concerns, please call the clinic at the number listed above.       Sincerely,      Melanie Patel MD

## 2022-09-22 ENCOUNTER — TELEPHONE (OUTPATIENT)
Dept: FAMILY MEDICINE | Facility: CLINIC | Age: 77
End: 2022-09-22

## 2022-09-22 DIAGNOSIS — I10 HYPERTENSION GOAL BP (BLOOD PRESSURE) < 140/90: Primary | ICD-10-CM

## 2022-09-22 DIAGNOSIS — E83.52 HYPERCALCEMIA: ICD-10-CM

## 2022-09-22 LAB
CALCIUM SERPL-MCNC: 10.6 MG/DL (ref 8.5–10.1)
DEPRECATED CALCIDIOL+CALCIFEROL SERPL-MC: 37 UG/L (ref 20–75)
TSH SERPL DL<=0.005 MIU/L-ACNC: 1.28 MU/L (ref 0.4–4)

## 2022-09-22 RX ORDER — METOPROLOL SUCCINATE 25 MG/1
25 TABLET, EXTENDED RELEASE ORAL DAILY
Qty: 90 TABLET | Refills: 3 | Status: SHIPPED | OUTPATIENT
Start: 2022-09-22 | End: 2023-09-11

## 2022-09-23 NOTE — TELEPHONE ENCOUNTER
Please call patient: your calcium level is high. I recommend changing hydrochlorothiazide to metoprolol and have sent a new prescription. Follow-up for ancillary blood pressure and lab appointments in 1 month.

## 2022-09-23 NOTE — TELEPHONE ENCOUNTER
Patient notified of provider message as written. Patient verbalized good understanding.     Hallie HURDN, RN  [facility]

## 2022-09-28 ENCOUNTER — ANCILLARY PROCEDURE (OUTPATIENT)
Dept: MAMMOGRAPHY | Facility: CLINIC | Age: 77
End: 2022-09-28
Payer: MEDICARE

## 2022-09-28 DIAGNOSIS — Z12.31 VISIT FOR SCREENING MAMMOGRAM: ICD-10-CM

## 2022-09-28 PROCEDURE — 77067 SCR MAMMO BI INCL CAD: CPT | Mod: TC | Performed by: RADIOLOGY

## 2022-09-28 PROCEDURE — 77063 BREAST TOMOSYNTHESIS BI: CPT | Mod: TC | Performed by: RADIOLOGY

## 2022-10-18 ENCOUNTER — LAB (OUTPATIENT)
Dept: LAB | Facility: CLINIC | Age: 77
End: 2022-10-18
Payer: MEDICARE

## 2022-10-18 DIAGNOSIS — E83.52 HYPERCALCEMIA: ICD-10-CM

## 2022-10-18 LAB — CALCIUM SERPL-MCNC: 11.2 MG/DL (ref 8.5–10.1)

## 2022-10-18 PROCEDURE — 36415 COLL VENOUS BLD VENIPUNCTURE: CPT

## 2022-10-18 PROCEDURE — 82310 ASSAY OF CALCIUM: CPT

## 2022-11-23 ENCOUNTER — TRANSFERRED RECORDS (OUTPATIENT)
Dept: HEALTH INFORMATION MANAGEMENT | Facility: CLINIC | Age: 77
End: 2022-11-23

## 2023-02-23 PROBLEM — D69.6 THROMBOCYTOPENIA (H): Status: ACTIVE | Noted: 2017-12-10

## 2023-02-23 RX ORDER — CELECOXIB 200 MG/1
1 CAPSULE ORAL DAILY PRN
COMMUNITY
Start: 2022-06-19

## 2023-02-28 ENCOUNTER — DOCUMENTATION ONLY (OUTPATIENT)
Dept: OTHER | Facility: CLINIC | Age: 78
End: 2023-02-28
Payer: MEDICARE

## 2023-09-11 DIAGNOSIS — I10 HYPERTENSION GOAL BP (BLOOD PRESSURE) < 140/90: ICD-10-CM

## 2023-09-11 RX ORDER — METOPROLOL SUCCINATE 25 MG/1
25 TABLET, EXTENDED RELEASE ORAL DAILY
Qty: 90 TABLET | Refills: 3 | Status: SHIPPED | OUTPATIENT
Start: 2023-09-11 | End: 2024-01-24

## 2023-09-15 ENCOUNTER — ANCILLARY ORDERS (OUTPATIENT)
Dept: FAMILY MEDICINE | Facility: CLINIC | Age: 78
End: 2023-09-15

## 2023-09-15 DIAGNOSIS — Z12.31 VISIT FOR SCREENING MAMMOGRAM: Primary | ICD-10-CM

## 2023-09-21 DIAGNOSIS — I10 HYPERTENSION GOAL BP (BLOOD PRESSURE) < 140/90: ICD-10-CM

## 2023-09-21 RX ORDER — LOSARTAN POTASSIUM 50 MG/1
50 TABLET ORAL DAILY
Qty: 90 TABLET | Refills: 0 | Status: SHIPPED | OUTPATIENT
Start: 2023-09-21 | End: 2023-12-20

## 2023-10-04 ENCOUNTER — TELEPHONE (OUTPATIENT)
Dept: FAMILY MEDICINE | Facility: CLINIC | Age: 78
End: 2023-10-04

## 2023-10-04 NOTE — LETTER
October 4, 2023      Tracie Feliciano  [address] [place] [facility] [place] [facility] [address]      Your team at [facility] cares about your health. We have reviewed your chart and based on our findings; we are making the following recommendations to better manage your health.     You are in particular need of attention regarding the following:     PREVENTATIVE VISIT: Annual Medicare Wellness:Schedule an Annual Medicare Wellness Exam. Please call your [facility] clinic to set up your appointment.    If you have already completed these items, please contact the clinic via phone or   MyChart so your care team can review and update your records. Thank you for   choosing [facility] Clinics for your healthcare needs. For any questions,   concerns, or to schedule an appointment please contact our clinic.    Healthy Regards,      Your [facility] Care Team

## 2023-10-04 NOTE — TELEPHONE ENCOUNTER
Patient Quality Outreach    Patient is due for the following:   Physical Annual Wellness Visit      Topic Date Due    Flu Vaccine (1) 09/01/2023    COVID-19 Vaccine (5 - 2023-24 season) 09/01/2023       Next Steps:   Schedule a Annual Wellness Visit    Type of outreach:    Sent letter.      Questions for provider review:    None           Dai Peterson [facility]  Chart routed to Care Team.

## 2023-10-05 ENCOUNTER — ANCILLARY PROCEDURE (OUTPATIENT)
Dept: MAMMOGRAPHY | Facility: CLINIC | Age: 78
End: 2023-10-05
Attending: FAMILY MEDICINE
Payer: MEDICARE

## 2023-10-05 DIAGNOSIS — Z12.31 VISIT FOR SCREENING MAMMOGRAM: ICD-10-CM

## 2023-10-05 PROCEDURE — 77067 SCR MAMMO BI INCL CAD: CPT | Mod: TC | Performed by: RADIOLOGY

## 2023-10-05 PROCEDURE — 77063 BREAST TOMOSYNTHESIS BI: CPT | Mod: TC | Performed by: RADIOLOGY

## 2023-10-11 DIAGNOSIS — E78.5 HYPERLIPIDEMIA WITH TARGET LDL LESS THAN 130: ICD-10-CM

## 2023-10-11 RX ORDER — SIMVASTATIN 40 MG
40 TABLET ORAL DAILY
Qty: 90 TABLET | Refills: 0 | Status: SHIPPED | OUTPATIENT
Start: 2023-10-11 | End: 2024-01-09

## 2023-11-07 NOTE — TELEPHONE ENCOUNTER
Patient Quality Outreach    Patient is due for the following:   Physical Annual Wellness Visit      Topic Date Due    COVID-19 Vaccine (5 - 2023-24 season) 09/01/2023       Next Steps:   Patient has upcoming appointment, these items will be addressed at that time. 1/24/24 Wellness Scheduled with PCP    Type of outreach:    Chart review performed, no outreach needed.    Next Steps:  Reach out within 90 days via Ping4hart.    Max number of attempts reached: Yes. Will try again in 90 days if patient still on fail list.    Questions for provider review:    None           Dai Peterson CMA  Chart routed to Care Team.

## 2023-12-20 DIAGNOSIS — I10 HYPERTENSION GOAL BP (BLOOD PRESSURE) < 140/90: ICD-10-CM

## 2023-12-20 DIAGNOSIS — Z78.0 ASYMPTOMATIC POSTMENOPAUSAL STATUS: Primary | ICD-10-CM

## 2023-12-20 RX ORDER — LOSARTAN POTASSIUM 50 MG/1
50 TABLET ORAL DAILY
Qty: 90 TABLET | Refills: 0 | Status: SHIPPED | OUTPATIENT
Start: 2023-12-20 | End: 2024-01-24

## 2024-01-09 DIAGNOSIS — E78.5 HYPERLIPIDEMIA WITH TARGET LDL LESS THAN 130: ICD-10-CM

## 2024-01-09 RX ORDER — SIMVASTATIN 40 MG
40 TABLET ORAL DAILY
Qty: 90 TABLET | Refills: 0 | Status: SHIPPED | OUTPATIENT
Start: 2024-01-09 | End: 2024-01-24

## 2024-01-24 ENCOUNTER — ANCILLARY ORDERS (OUTPATIENT)
Dept: FAMILY MEDICINE | Facility: CLINIC | Age: 79
End: 2024-01-24

## 2024-01-24 ENCOUNTER — TRANSFERRED RECORDS (OUTPATIENT)
Dept: MULTI SPECIALTY CLINIC | Facility: CLINIC | Age: 79
End: 2024-01-24

## 2024-01-24 ENCOUNTER — OFFICE VISIT (OUTPATIENT)
Dept: FAMILY MEDICINE | Facility: CLINIC | Age: 79
End: 2024-01-24
Payer: MEDICARE

## 2024-01-24 ENCOUNTER — ANCILLARY PROCEDURE (OUTPATIENT)
Dept: BONE DENSITY | Facility: CLINIC | Age: 79
End: 2024-01-24
Attending: FAMILY MEDICINE
Payer: MEDICARE

## 2024-01-24 VITALS
OXYGEN SATURATION: 98 % | RESPIRATION RATE: 12 BRPM | SYSTOLIC BLOOD PRESSURE: 120 MMHG | TEMPERATURE: 98.3 F | BODY MASS INDEX: 27.49 KG/M2 | HEIGHT: 64 IN | HEART RATE: 58 BPM | DIASTOLIC BLOOD PRESSURE: 73 MMHG | WEIGHT: 161 LBS

## 2024-01-24 DIAGNOSIS — E21.0 PRIMARY HYPERPARATHYROIDISM (H): ICD-10-CM

## 2024-01-24 DIAGNOSIS — E78.5 HYPERLIPIDEMIA WITH TARGET LDL LESS THAN 130: ICD-10-CM

## 2024-01-24 DIAGNOSIS — M81.0 AGE-RELATED OSTEOPOROSIS WITHOUT CURRENT PATHOLOGICAL FRACTURE: ICD-10-CM

## 2024-01-24 DIAGNOSIS — Z00.00 ENCOUNTER FOR MEDICARE ANNUAL WELLNESS EXAM: Primary | ICD-10-CM

## 2024-01-24 DIAGNOSIS — Z78.0 ASYMPTOMATIC POSTMENOPAUSAL STATUS: Primary | ICD-10-CM

## 2024-01-24 DIAGNOSIS — I10 HYPERTENSION GOAL BP (BLOOD PRESSURE) < 140/90: ICD-10-CM

## 2024-01-24 DIAGNOSIS — E83.52 HYPERCALCEMIA: ICD-10-CM

## 2024-01-24 DIAGNOSIS — Z78.0 ASYMPTOMATIC POSTMENOPAUSAL STATUS: ICD-10-CM

## 2024-01-24 PROBLEM — Z85.3 PERSONAL HISTORY OF MALIGNANT NEOPLASM OF BREAST: Status: ACTIVE | Noted: 2021-07-06

## 2024-01-24 PROBLEM — D69.6 THROMBOCYTOPENIA (H): Status: RESOLVED | Noted: 2017-12-10 | Resolved: 2024-01-24

## 2024-01-24 PROCEDURE — 80048 BASIC METABOLIC PNL TOTAL CA: CPT | Performed by: FAMILY MEDICINE

## 2024-01-24 PROCEDURE — G0439 PPPS, SUBSEQ VISIT: HCPCS | Performed by: FAMILY MEDICINE

## 2024-01-24 PROCEDURE — 80061 LIPID PANEL: CPT | Performed by: FAMILY MEDICINE

## 2024-01-24 PROCEDURE — 83970 ASSAY OF PARATHORMONE: CPT | Performed by: FAMILY MEDICINE

## 2024-01-24 PROCEDURE — 36415 COLL VENOUS BLD VENIPUNCTURE: CPT | Performed by: FAMILY MEDICINE

## 2024-01-24 PROCEDURE — 77081 DXA BONE DENSITY APPENDICULR: CPT | Mod: TC | Performed by: PHYSICIAN ASSISTANT

## 2024-01-24 PROCEDURE — 99214 OFFICE O/P EST MOD 30 MIN: CPT | Mod: 25 | Performed by: FAMILY MEDICINE

## 2024-01-24 PROCEDURE — 77080 DXA BONE DENSITY AXIAL: CPT | Mod: TC | Performed by: PHYSICIAN ASSISTANT

## 2024-01-24 PROCEDURE — 82306 VITAMIN D 25 HYDROXY: CPT | Performed by: FAMILY MEDICINE

## 2024-01-24 RX ORDER — LOSARTAN POTASSIUM 50 MG/1
50 TABLET ORAL DAILY
Qty: 90 TABLET | Refills: 3 | Status: SHIPPED | OUTPATIENT
Start: 2024-01-24

## 2024-01-24 RX ORDER — ALENDRONATE SODIUM 70 MG/1
70 TABLET ORAL
Qty: 12 TABLET | Refills: 3 | Status: SHIPPED | OUTPATIENT
Start: 2024-01-24

## 2024-01-24 RX ORDER — METOPROLOL SUCCINATE 25 MG/1
25 TABLET, EXTENDED RELEASE ORAL DAILY
Qty: 90 TABLET | Refills: 3 | Status: SHIPPED | OUTPATIENT
Start: 2024-01-24

## 2024-01-24 RX ORDER — SIMVASTATIN 40 MG
40 TABLET ORAL DAILY
Qty: 90 TABLET | Refills: 3 | Status: SHIPPED | OUTPATIENT
Start: 2024-01-24

## 2024-01-24 ASSESSMENT — ENCOUNTER SYMPTOMS
JOINT SWELLING: 0
PARESTHESIAS: 0
MYALGIAS: 1
EYE PAIN: 0
SHORTNESS OF BREATH: 0
HEARTBURN: 0
BREAST MASS: 0
ARTHRALGIAS: 0
FEVER: 0
SORE THROAT: 0
HEMATURIA: 0
DIZZINESS: 0
FREQUENCY: 1
DYSURIA: 0
ABDOMINAL PAIN: 0
HEMATOCHEZIA: 0
PALPITATIONS: 0
CHILLS: 0
WEAKNESS: 0
CONSTIPATION: 0
NERVOUS/ANXIOUS: 0
COUGH: 0
DIARRHEA: 0
NAUSEA: 0

## 2024-01-24 ASSESSMENT — ACTIVITIES OF DAILY LIVING (ADL): CURRENT_FUNCTION: NO ASSISTANCE NEEDED

## 2024-01-24 NOTE — PROGRESS NOTES
"Preventive Care Visit  [facility] EDGAR Patel MD, Family Medicine  Jan 24, 2024      SUBJECTIVE:   Tracie is a 78 year old, presenting for the following:  Physical        1/24/2024     1:51 PM   Additional Questions   Roomed by Flor SOUZA CMA   Accompanied by Self     Are you in the first 12 months of your Medicare coverage?  No    Healthy Habits:     In general, how would you rate your overall health?  Good    Frequency of exercise:  2-3 days/week    Duration of exercise:  15-30 minutes    Do you usually eat at least 4 servings of fruit and vegetables a day, include whole grains    & fiber and avoid regularly eating high fat or \"junk\" foods?  Yes    Taking medications regularly:  Yes    Medication side effects:  Not applicable and Muscle aches    Ability to successfully perform activities of daily living:  No assistance needed    Home Safety:  No safety concerns identified    Hearing Impairment:  No hearing concerns    In the past 6 months, have you been bothered by leaking of urine? Yes    In general, how would you rate your overall mental or emotional health?  Good    Additional concerns today:  No      Today's PHQ-2 Score:       1/24/2024     1:43 PM   PHQ-2 ( 1999 [employer])   Q1: Little interest or pleasure in doing things 0   Q2: Feeling down, depressed or hopeless 0   PHQ-2 Score 0   Q1: Little interest or pleasure in doing things Not at all   Q2: Feeling down, depressed or hopeless Not at all   PHQ-2 Score 0           Via the Health Maintenance questionnaire, the patient has reported the following services have been completed -DEXA, this information has been sent to the abstraction team.  Have you ever done Advance Care Planning? (For example, a Health Directive, POLST, or a discussion with a medical provider or your loved ones about your wishes): Yes, advance care planning is on file.       Fall risk  Fallen 2 or more times in the past year?: No  Any fall with injury in the past year?: " No    Cognitive Screening   1) Repeat 3 items (Leader, Season, Table)    2) Clock draw: NORMAL  3) 3 item recall: Recalls 2 objects   Results: NORMAL clock, 1-2 items recalled: COGNITIVE IMPAIRMENT LESS LIKELY    Mini-CogTM Copyright S Maverick. Licensed by the author for use in [facility]; reprinted with permission (alice@[facility]). All rights reserved.      Do you have sleep apnea, excessive snoring or daytime drowsiness? : no    Reviewed and updated as needed this visit by clinical staff   Tobacco  Allergies  Meds  Problems  Med Hx  Surg Hx  Fam Hx          Reviewed and updated as needed this visit by Provider   Tobacco  Allergies  Meds  Problems  Med Hx  Surg Hx  Fam Hx          Social History     Tobacco Use    Smoking status: Former     Packs/day: 0.50     Years: 1.00     Additional pack years: 0.00     Total pack years: 0.50     Types: Cigarettes     Quit date: 1967     Years since quittin.1    Smokeless tobacco: Never   Substance Use Topics    Alcohol use: Not Currently     Alcohol/week: 2.5 standard drinks of alcohol     Types: 3 Standard drinks or equivalent per week             2024     1:41 PM   Alcohol Use   Prescreen: >3 drinks/day or >7 drinks/week? No     Do you have a current opioid prescription? No  Do you use any other controlled substances or medications that are not prescribed by a provider? None     Current providers sharing in care for this patient include:   Patient Care Team:  Melanie Patel MD as PCP - General  Melanie Patel MD as Assigned PCP  Daly Moraes PA-C as Physician Assistant (Dermatology)    The following health maintenance items are reviewed in Epic and correct as of today:  Health Maintenance   Topic Date Due    BMP  2023    LIPID  2023    MAMMO SCREENING  10/05/2024    MEDICARE ANNUAL WELLNESS VISIT  2025    ANNUAL REVIEW OF HM ORDERS  2025    FALL RISK ASSESSMENT  2025    DEXA   2026    ADVANCE CARE PLANNING  2029    DTAP/TDAP/TD IMMUNIZATION (4 - Td or Tdap) 2031    HEPATITIS C SCREENING  Completed    PHQ-2 (once per calendar year)  Completed    INFLUENZA VACCINE  Completed    Pneumococcal Vaccine: 65+ Years  Completed    ZOSTER IMMUNIZATION  Completed    RSV VACCINE (Pregnancy & 60+)  Completed    COVID-19 Vaccine  Completed    IPV IMMUNIZATION  Aged Out    HPV IMMUNIZATION  Aged Out    MENINGITIS IMMUNIZATION  Aged Out    RSV MONOCLONAL ANTIBODY  Aged Out    COLORECTAL CANCER SCREENING  Discontinued     Patient Active Problem List   Diagnosis    Allergic rhinitis    Hyperlipidemia with target LDL less than 130    Hypertension goal BP (blood pressure) < 140/90    Cataract    Venous (peripheral) insufficiency    Hypercalcemia    Urge incontinence of urine    Personal history of malignant neoplasm of breast    Primary osteoarthritis of both hips    History of iron deficiency    Lumbar disc herniation with radiculopathy    Primary osteoarthritis of right hand    Osteoporosis     Past Surgical History:   Procedure Laterality Date    C/SECTION, LOW TRANSVERSE  80    COLONOSCOPY  2012    Procedure: COLONOSCOPY;  COLONOSCOPY, SCREEN, PREVIOUS POLYP;  Surgeon: Maverick Maradiaga MD;  Location: [facility] OR    EYE SURGERY Bilateral     cataract    HYSTERECTOMY, PAP NO LONGER INDICATED  09    BSO     LUMPECTOMY BREAST Left 2017       Social History     Tobacco Use    Smoking status: Former     Packs/day: 0.50     Years: 1.00     Additional pack years: 0.00     Total pack years: 0.50     Types: Cigarettes     Quit date: 1967     Years since quittin.1    Smokeless tobacco: Never   Substance Use Topics    Alcohol use: Not Currently     Alcohol/week: 2.5 standard drinks of alcohol     Types: 3 Standard drinks or equivalent per week     Family History   Problem Relation Age of Onset    Cancer Mother         d. pelvic    C.A.MATTHEW. Mother         ?    C.A.ASHVIN  Father 79        MI, d.    Alzheimer Disease Father     Gastrointestinal Disease Father         PUD    Cancer Maternal Aunt         GI?    Diabetes No family hx of          Current Outpatient Medications   Medication Sig Dispense Refill    alendronate (FOSAMAX) 70 MG tablet Take 1 tablet (70 mg) by mouth every 7 days 12 tablet 3    celecoxib (CELEBREX) 200 MG capsule Take 1 mg by mouth daily as needed      Cholecalciferol (VITAMIN D) 2000 UNITS tablet Take 2,000 Units by mouth daily 2 tablets daily      ferrous sulfate (FEROSUL) 325 (65 Fe) MG tablet Take 1 tablet (325 mg) by mouth daily (with breakfast) 90 tablet 3    losartan (COZAAR) 50 MG tablet Take 1 tablet (50 mg) by mouth daily 90 tablet 3    metoprolol succinate ER (TOPROL XL) 25 MG 24 hr tablet Take 1 tablet (25 mg) by mouth daily 90 tablet 3    simvastatin (ZOCOR) 40 MG tablet Take 1 tablet (40 mg) by mouth daily 90 tablet 3    tamoxifen (NOLVADEX) 20 MG tablet Take 20 mg by mouth daily      timolol (TIMOPTIC) 0.5 % ophthalmic solution PLACE 1 DROP INTO THE LEFT EYE QAM  3     Allergies   Allergen Reactions    Aspirin      Reactive gastropathy    Contrast Dye      sneezing    Hydrochlorothiazide      Hypercalcemia     Lisinopril Cough           Pertinent mammograms are reviewed under the imaging tab.  Review of Systems   Constitutional:  Negative for chills and fever.   HENT:  Positive for congestion. Negative for ear pain and sore throat.    Eyes:  Positive for visual disturbance. Negative for pain.   Respiratory:  Negative for cough and shortness of breath.    Cardiovascular:  Negative for chest pain and palpitations.   Gastrointestinal:  Negative for abdominal pain, constipation, diarrhea and nausea.   Genitourinary:  Positive for frequency. Negative for dysuria, genital sores, hematuria, pelvic pain, urgency, vaginal bleeding and vaginal discharge.   Musculoskeletal:  Positive for myalgias. Negative for arthralgias and joint swelling.   Skin:   "Negative for rash.   Neurological:  Negative for dizziness and weakness.   Psychiatric/Behavioral:  The patient is not nervous/anxious.           OBJECTIVE:   /73 (BP Location: Left arm, Patient Position: Sitting, Cuff Size: Adult Regular)   Pulse 58   Temp 98.3  F (36.8  C) (Oral)   Resp 12   Ht 1.628 m (5' 4.09\")   Wt 73 kg (161 lb)   SpO2 98%   BMI 27.55 kg/m     Estimated body mass index is 27.55 kg/m  as calculated from the following:    Height as of this encounter: 1.628 m (5' 4.09\").    Weight as of this encounter: 73 kg (161 lb).  Physical Exam  GENERAL: alert and no distress  NECK: no adenopathy, no asymmetry, masses, or scars  RESP: lungs clear to auscultation - no rales, rhonchi or wheezes  CV: regular rate and rhythm, normal S1 S2, no S3 or S4, no murmur, click or rub, no peripheral edema  MS: no gross musculoskeletal defects noted, no edema  PSYCH: mentation appears normal, affect normal/bright    Diagnostic Test Results:  Labs reviewed in Epic    ASSESSMENT / PLAN:   Encounter for Medicare annual wellness exam    Hyperlipidemia with target LDL less than 130  -continue   - Lipid panel reflex to direct LDL Non-fasting; Future  - simvastatin (ZOCOR) 40 MG tablet; Take 1 tablet (40 mg) by mouth daily  - Lipid panel reflex to direct LDL Non-fasting    Hypertension goal BP (blood pressure) < 140/90  -Well controlled with medications without side effects.   - BASIC METABOLIC PANEL; Future  - losartan (COZAAR) 50 MG tablet; Take 1 tablet (50 mg) by mouth daily  - metoprolol succinate ER (TOPROL XL) 25 MG 24 hr tablet; Take 1 tablet (25 mg) by mouth daily  - BASIC METABOLIC PANEL    Age-related osteoporosis without current pathological fracture  - alendronate (FOSAMAX) 70 MG tablet; Take 1 tablet (70 mg) by mouth every 7 days  -Discussed risks and benefits of this medication.  -Continue vitamin D supplement, regular exercise, falls precautions and DEXA bone scan every 2 - 3 " years.    Hypercalcemia  -reviewed prior endocrinology consult   - Parathyroid Hormone Intact; Future  - Parathyroid Hormone Intact          Counseling  Reviewed preventive health counseling, as reflected in patient instructions  Special attention given to:       Regular exercise       Healthy diet/nutrition       Fall risk prevention       Osteoporosis prevention/bone health        She reports that she quit smoking about 57 years ago. Her smoking use included cigarettes. She has a 0.5 pack-year smoking history. She has never used smokeless tobacco.      Appropriate preventive services were discussed with this patient, including applicable screening as appropriate for fall prevention, nutrition, physical activity, Tobacco-use cessation, weight loss and cognition.  Checklist reviewing preventive services available has been given to the patient.    Reviewed patients plan of care and provided an AVS. The Basic Care Plan (routine screening as documented in Health Maintenance) for Tracie meets the Care Plan requirement. This Care Plan has been established and reviewed with the Patient.          Signed Electronically by: Melanie Patel MD    Identified Health Risks  I have reviewed Opioid Use Disorder and Substance Use Disorder risk factors and made any needed referrals.   Answers submitted by the patient for this visit:  Annual Preventive Visit (Submitted on 1/24/2024)  Chief Complaint: Annual Exam:  Blood in stool: No  heartburn: No  peripheral edema: Yes  mood changes: No  Skin sensation changes: No  tenderness: No  breast mass: No  breast discharge: No  Information on urinary incontinence and treatment options given to patient.

## 2024-01-24 NOTE — LETTER
January 25, 2024    Tracie Feliciano  [address]  [facility] [facility] [address]          Dear MsSeymour,    We are writing to inform you of your test results.    You have mild hyperparathyroidism. We can follow this yearly. Consider follow-up with your endocrinologist.     Your kidney, blood glucose and vitamin D tests are normal. Your cholesterol is controlled.       Resulted Orders   BASIC METABOLIC PANEL   Result Value Ref Range    Sodium 138 135 - 145 mmol/L      Comment:      Reference intervals for this test were updated on 09/26/2023 to more accurately reflect our healthy population. There may be differences in the flagging of prior results with similar values performed with this method. Interpretation of those prior results can be made in the context of the updated reference intervals.     Potassium 4.0 3.4 - 5.3 mmol/L    Chloride 102 98 - 107 mmol/L    Carbon Dioxide (CO2) 27 22 - 29 mmol/L    Anion Gap 9 7 - 15 mmol/L    Urea Nitrogen 13.9 8.0 - 23.0 mg/dL    Creatinine 0.55 0.51 - 0.95 mg/dL    GFR Estimate >90 >60 mL/min/1.73m2    Calcium 10.7 (H) 8.8 - 10.2 mg/dL    Glucose 91 70 - 99 mg/dL   Lipid panel reflex to direct LDL Non-fasting   Result Value Ref Range    Cholesterol 169 <200 mg/dL    Triglycerides 154 (H) <150 mg/dL    Direct Measure HDL 48 (L) >=50 mg/dL    LDL Cholesterol Calculated 90 <=100 mg/dL    Non HDL Cholesterol 121 <130 mg/dL    Patient Fasting > 8hrs? No     Narrative    Cholesterol  Desirable:  <200 mg/dL    Triglycerides  Normal:  Less than 150 mg/dL  Borderline High:  150-199 mg/dL  High:  200-499 mg/dL  Very High:  Greater than or equal to 500 mg/dL    Direct Measure HDL  Female:  Greater than or equal to 50 mg/dL   Male:  Greater than or equal to 40 mg/dL    LDL Cholesterol  Desirable:  <100mg/dL  Above Desirable:  100-129 mg/dL   Borderline High:  130-159 mg/dL   High:  160-189 mg/dL   Very High:  >= 190 mg/dL    Non HDL Cholesterol  Desirable:  130 mg/dL  Above  Desirable:  130-159 mg/dL  Borderline High:  160-189 mg/dL  High:  190-219 mg/dL  Very High:  Greater than or equal to 220 mg/dL   Parathyroid Hormone Intact   Result Value Ref Range    Parathyroid Hormone Intact 74 (H) 15 - 65 pg/mL    Narrative    This result was obtained with the Roche Elecsys PTH STAT assay.   This reference range differs from PTH assays used in other [facility] laboratories.   Vitamin D Deficiency   Result Value Ref Range    Vitamin D, Total (25-Hydroxy) 41 20 - 50 ng/mL      Comment:      optimum levels    Narrative    Season, race, dietary intake, and treatment affect the concentration of 25-hydroxy-Vitamin D. Values may decrease during winter months and increase during summer months.    Vitamin D determination is routinely performed by an immunoassay specific for 25 hydroxyvitamin D3.  If an individual is on vitamin D2(ergocalciferol) supplementation, please specify 25 OH vitamin D2 and D3 level determination by LCMSMS test VITD23.         If you have any questions or concerns, please call the clinic at the number listed above.       Sincerely,      Melanie Patel MD

## 2024-01-24 NOTE — PATIENT INSTRUCTIONS
Patient Education   Personalized Prevention Plan  You are due for the preventive services outlined below.  Your care team is available to assist you in scheduling these services.  If you have already completed any of these items, please share that information with your care team to update in your medical record.  Health Maintenance Due   Topic Date Due     Basic Metabolic Panel  09/14/2023     Cholesterol Lab  09/14/2023     Bladder Training: Care Instructions  Your Care Instructions     Bladder training is used to treat urge incontinence and stress incontinence. Urge incontinence means that the need to urinate comes on so fast that you can't get to a toilet in time. Stress incontinence means that you leak urine because of pressure on your bladder. For example, it may happen when you laugh, cough, or lift something heavy.  Bladder training can increase how long you can wait before you have to urinate. It can also help your bladder hold more urine. And it can give you better control over the urge to urinate.  It is important to remember that bladder training takes a few weeks to a few months to make a difference. You may not see results right away, but don't give up.  Follow-up care is a key part of your treatment and safety. Be sure to make and go to all appointments, and call your doctor if you are having problems. It's also a good idea to know your test results and keep a list of the medicines you take.  How can you care for yourself at home?  Work with your doctor to come up with a bladder training program that is right for you. You may use one or more of the following methods.  Delayed urination  In the beginning, try to keep from urinating for 5 minutes after you first feel the need to go.  While you wait, take deep, slow breaths to relax. Kegel exercises can also help you delay the need to go to the bathroom.  After some practice, when you can easily wait 5 minutes to urinate, try to wait 10 minutes before you  "urinate.  Slowly increase the waiting period until you are able to control when you have to urinate.  Scheduled urination  Empty your bladder when you first wake up in the morning.  Schedule times throughout the day when you will urinate.  Start by going to the bathroom every hour, even if you don't need to go.  Slowly increase the time between trips to the bathroom.  When you have found a schedule that works well for you, keep doing it.  If you wake up during the night and have to urinate, do it. Apply your schedule to waking hours only.  Kegel exercises  These tighten and strengthen pelvic muscles, which can help you control the flow of urine. (If doing these exercises causes pain, stop doing them and talk with your doctor.) To do Kegel exercises:  Squeeze your muscles as if you were trying not to pass gas. Or squeeze your muscles as if you were stopping the flow of urine. Your belly, legs, and buttocks shouldn't move.  Hold the squeeze for 3 seconds, then relax for 5 to 10 seconds.  Start with 3 seconds, then add 1 second each week until you are able to squeeze for 10 seconds.  Repeat the exercise 10 times a session. Do 3 to 8 sessions a day.  When should you call for help?  Watch closely for changes in your health, and be sure to contact your doctor if:    Your incontinence is getting worse.     You do not get better as expected.   Where can you learn more?  Go to https://www.[employer].net/patiented  Enter V684 in the search box to learn more about \"Bladder Training: Care Instructions.\"  Current as of: February 28, 2023               Content Version: 13.8    7367-3543 [employer].   Care instructions adapted under license by your healthcare professional. If you have questions about a medical condition or this instruction, always ask your healthcare professional. [employer] disclaims any warranty or liability for your use of this information.         "

## 2024-01-25 LAB
ANION GAP SERPL CALCULATED.3IONS-SCNC: 9 MMOL/L (ref 7–15)
BUN SERPL-MCNC: 13.9 MG/DL (ref 8–23)
CALCIUM SERPL-MCNC: 10.7 MG/DL (ref 8.8–10.2)
CHLORIDE SERPL-SCNC: 102 MMOL/L (ref 98–107)
CHOLEST SERPL-MCNC: 169 MG/DL
CREAT SERPL-MCNC: 0.55 MG/DL (ref 0.51–0.95)
DEPRECATED HCO3 PLAS-SCNC: 27 MMOL/L (ref 22–29)
EGFRCR SERPLBLD CKD-EPI 2021: >90 ML/MIN/1.73M2
FASTING STATUS PATIENT QL REPORTED: NO
GLUCOSE SERPL-MCNC: 91 MG/DL (ref 70–99)
HDLC SERPL-MCNC: 48 MG/DL
LDLC SERPL CALC-MCNC: 90 MG/DL
NONHDLC SERPL-MCNC: 121 MG/DL
POTASSIUM SERPL-SCNC: 4 MMOL/L (ref 3.4–5.3)
PTH-INTACT SERPL-MCNC: 74 PG/ML (ref 15–65)
SODIUM SERPL-SCNC: 138 MMOL/L (ref 135–145)
TRIGL SERPL-MCNC: 154 MG/DL
VIT D+METAB SERPL-MCNC: 41 NG/ML (ref 20–50)

## 2024-01-25 NOTE — RESULT ENCOUNTER NOTE
Tracie,    You have mild hyperparathyroidism. We can follow this yearly. Consider follow-up with your endocrinologist.     Your kidney, blood glucose and vitamin D tests are normal. Your cholesterol is controlled.     Melanie Patel MD

## 2024-05-13 ENCOUNTER — TELEPHONE (OUTPATIENT)
Dept: FAMILY MEDICINE | Facility: CLINIC | Age: 79
End: 2024-05-13
Payer: MEDICARE

## 2024-05-13 NOTE — TELEPHONE ENCOUNTER
Pt calling      She had questions regarding her fosmax, continue to take weekly.    She was also wondering when her next colonoscopy should be. According to records her last colonoscopy was 2012 and was recommended every 10 years.      Can PCP please place order for pt to schedule colonoscopy?      PRATIMA Doty    Triage Nurse  [facility]

## 2024-05-13 NOTE — TELEPHONE ENCOUNTER
We typically discontinue colon cancer screening at age 77 due to increased risks of problems during a colonoscopy due to age. So no further colonoscopy needed.     She should continue the fosamax.   Lilia Mendez PA-C

## 2024-05-13 NOTE — LETTER
"May 15, 2024      Tracie Feliciano  [address]  [place] [facility] [address]        Dear Tracie,       We have been trying to get a hold of you regarding some questions you had for your provide.    Per your provider:    \"We typically discontinue colon cancer screening at age 77 due to increased risks of problems during a colonoscopy due to age. So no further colonoscopy needed.      She should continue the fosamax.\"    Please let us know if you have any other concerns.         Thank you for choosing us your partner in health care.     [facility] Care Team          "

## 2024-05-13 NOTE — TELEPHONE ENCOUNTER
Attempted call x 2 and line was busy. Will need to try calling again.    Isa Rodriguez RN  [facility]

## 2024-05-15 NOTE — TELEPHONE ENCOUNTER
Attempt #3, letter cued. Please print and mail to address on file. Ok to close this encounter once complete.     Thanks,  PRATIMA Neves  [facility]

## 2024-06-26 ENCOUNTER — TELEPHONE (OUTPATIENT)
Dept: FAMILY MEDICINE | Facility: CLINIC | Age: 79
End: 2024-06-26
Payer: MEDICARE

## 2024-06-26 NOTE — TELEPHONE ENCOUNTER
Pt calling to state that she started taking OTC Nature's made iron tablet 65mg iron tablet and wonders if there a medication interactions with alendronate. RN informed pt that the pharmacist would be a good resource to ask for med interactions. RN offered to send message to pcp to advise as alendronate was prescribed for osteoporosis. Pt declined and will follow up with pcp if pharmacy recommends pcp's input.     Pt noted that she has been Wearing a pad for urinary frequency and wanted to make sure this was documented. RN informed pt that pcp had noted urinary frequency at 1/24/24 OV. No further questions from pt.     An Delgadillo RN on 6/26/2024 at 12:30 PM

## 2024-08-09 NOTE — PROGRESS NOTES
"Subjective     Tracie Feliciano is a 74 year old female who presents to clinic today for the following health issues:    HPI   {SUPERLIST (Optional):258856}  {additonal problems for provider to add (Optional):876685}    {HIST REVIEW/ LINKS 2 (Optional):502326}    Reviewed and updated as needed this visit by Provider         Review of Systems   {ROS COMP (Optional):764705}      Objective    There were no vitals taken for this visit.  There is no height or weight on file to calculate BMI.  Physical Exam   {Exam List (Optional):631672}    {Diagnostic Test Results (Optional):764618::\"Diagnostic Test Results:\",\"Labs reviewed in Epic\"}        {PROVIDER CHARTING PREFERENCE:437196}    " Application Tool (Optional): Liquid Nitrogen Sprayer Render Post-Care Instructions In Note?: yes Render Note In Bullet Format When Appropriate: No Medical Necessity Clause: This procedure was medically necessary because the lesions that were treated were: Detail Level: Detailed Consent: The patient's consent was obtained including but not limited to risks of crusting, scabbing, blistering, scarring, darker or lighter pigmentary change, recurrence, incomplete removal and infection. Medical Necessity Information: It is in your best interest to select a reason for this procedure from the list below. All of these items fulfill various CMS LCD requirements except the new and changing color options. Spray Paint Text: The liquid nitrogen was applied to the skin utilizing a spray paint frosting technique. Post-Care Instructions: I reviewed with the patient in detail post-care instructions. Patient is to wear sunprotection, and avoid picking at any of the treated lesions. Pt may apply Vaseline to crusted or scabbing areas.

## 2024-09-19 ENCOUNTER — ANCILLARY ORDERS (OUTPATIENT)
Dept: MAMMOGRAPHY | Facility: CLINIC | Age: 79
End: 2024-09-19

## 2024-09-19 DIAGNOSIS — Z12.31 VISIT FOR SCREENING MAMMOGRAM: Primary | ICD-10-CM

## 2024-10-09 ENCOUNTER — ANCILLARY PROCEDURE (OUTPATIENT)
Dept: MAMMOGRAPHY | Facility: CLINIC | Age: 79
End: 2024-10-09
Attending: FAMILY MEDICINE
Payer: MEDICARE

## 2024-10-09 DIAGNOSIS — Z12.31 VISIT FOR SCREENING MAMMOGRAM: ICD-10-CM

## 2024-10-09 PROCEDURE — 77067 SCR MAMMO BI INCL CAD: CPT | Mod: TC | Performed by: RADIOLOGY

## 2024-10-09 PROCEDURE — 77063 BREAST TOMOSYNTHESIS BI: CPT | Mod: TC | Performed by: RADIOLOGY

## 2025-01-09 DIAGNOSIS — E78.5 HYPERLIPIDEMIA WITH TARGET LDL LESS THAN 130: ICD-10-CM

## 2025-01-09 RX ORDER — SIMVASTATIN 40 MG
40 TABLET ORAL DAILY
Qty: 90 TABLET | Refills: 0 | Status: SHIPPED | OUTPATIENT
Start: 2025-01-09

## 2025-01-30 ENCOUNTER — TELEPHONE (OUTPATIENT)
Dept: FAMILY MEDICINE | Facility: CLINIC | Age: 80
End: 2025-01-30
Payer: MEDICARE

## 2025-02-11 ENCOUNTER — TELEPHONE (OUTPATIENT)
Dept: FAMILY MEDICINE | Facility: CLINIC | Age: 80
End: 2025-02-11

## 2025-02-11 NOTE — TELEPHONE ENCOUNTER
Patient Quality Outreach    Patient is due for the following:   Physical Annual Wellness Visit    Action(s) Taken:   Schedule a Annual Wellness Visit    Type of outreach:    Sent letter.    Questions for provider review:    None           Dai Peterson [facility]  Chart routed to Care Team.

## 2025-02-11 NOTE — LETTER
February 11, 2025      Tracie Feliciano  [address]  [facility] [address]      Your team at [facility] cares about your health. We have reviewed your chart and based on our findings; we are making the following recommendations to better manage your health.     You are in particular need of attention regarding the following:     PREVENTATIVE VISIT: Annual Medicare Wellness:Schedule an Annual Medicare Wellness Exam. Please call your [facility] clinic to set up your appointment.    If you have already completed these items, please contact the clinic via phone or   MyChart so your care team can review and update your records. Thank you for   choosing [facility] for your healthcare needs. For any questions,   concerns, or to schedule an appointment please contact our clinic.    Healthy Regards,      Your [facility] Care Team            Electronically signed

## 2025-02-18 ENCOUNTER — TELEPHONE (OUTPATIENT)
Dept: FAMILY MEDICINE | Facility: CLINIC | Age: 80
End: 2025-02-18
Payer: MEDICARE

## 2025-02-18 NOTE — TELEPHONE ENCOUNTER
Reason for Call:  Appointment Request    Patient requesting this type of appt:  Preventive     Requested provider: Melanie Patel    Reason patient unable to be scheduled: Not within requested timeframe    When does patient want to be seen/preferred time:  ASAP as long as the appt is not seen as an emergency by Jorge. June is just a little too far out for Tracie.      Okay to leave a detailed message?: Yes at Cell number on file:    Telephone Information:   Mobile [phone]       Call taken on 2/18/2025 at 3:58 PM by Lalitha Mae

## 2025-02-19 NOTE — TELEPHONE ENCOUNTER
Called Tracie and left a message regarding wellness visit. Okay to take approval request spot with 40 minutes.  Dai Peterson CMA

## 2025-03-04 DIAGNOSIS — I10 HYPERTENSION GOAL BP (BLOOD PRESSURE) < 140/90: ICD-10-CM

## 2025-03-04 RX ORDER — METOPROLOL SUCCINATE 25 MG/1
25 TABLET, EXTENDED RELEASE ORAL DAILY
Qty: 30 TABLET | Refills: 0 | Status: SHIPPED | OUTPATIENT
Start: 2025-03-04

## 2025-03-06 DIAGNOSIS — I10 HYPERTENSION GOAL BP (BLOOD PRESSURE) < 140/90: ICD-10-CM

## 2025-03-06 RX ORDER — METOPROLOL SUCCINATE 25 MG/1
25 TABLET, EXTENDED RELEASE ORAL DAILY
Qty: 90 TABLET | OUTPATIENT
Start: 2025-03-06

## 2025-03-15 DIAGNOSIS — I10 HYPERTENSION GOAL BP (BLOOD PRESSURE) < 140/90: ICD-10-CM

## 2025-03-17 RX ORDER — LOSARTAN POTASSIUM 50 MG/1
50 TABLET ORAL DAILY
Qty: 90 TABLET | Refills: 0 | Status: SHIPPED | OUTPATIENT
Start: 2025-03-17 | End: 2025-03-19

## 2025-03-19 ENCOUNTER — OFFICE VISIT (OUTPATIENT)
Dept: FAMILY MEDICINE | Facility: CLINIC | Age: 80
End: 2025-03-19
Payer: MEDICARE

## 2025-03-19 VITALS
HEIGHT: 65 IN | DIASTOLIC BLOOD PRESSURE: 79 MMHG | RESPIRATION RATE: 16 BRPM | SYSTOLIC BLOOD PRESSURE: 130 MMHG | OXYGEN SATURATION: 98 % | BODY MASS INDEX: 26.66 KG/M2 | HEART RATE: 71 BPM | TEMPERATURE: 98.5 F | WEIGHT: 160 LBS

## 2025-03-19 DIAGNOSIS — I10 HYPERTENSION GOAL BP (BLOOD PRESSURE) < 140/90: ICD-10-CM

## 2025-03-19 DIAGNOSIS — E78.00 HIGH CHOLESTEROL: ICD-10-CM

## 2025-03-19 DIAGNOSIS — Z00.00 ENCOUNTER FOR MEDICARE ANNUAL WELLNESS EXAM: Primary | ICD-10-CM

## 2025-03-19 DIAGNOSIS — M81.0 AGE-RELATED OSTEOPOROSIS WITHOUT CURRENT PATHOLOGICAL FRACTURE: ICD-10-CM

## 2025-03-19 DIAGNOSIS — E21.0 PRIMARY HYPERPARATHYROIDISM: ICD-10-CM

## 2025-03-19 DIAGNOSIS — N39.41 URGE INCONTINENCE OF URINE: ICD-10-CM

## 2025-03-19 DIAGNOSIS — N81.11 CYSTOCELE, MIDLINE: ICD-10-CM

## 2025-03-19 PROBLEM — E83.52 HYPERCALCEMIA: Status: RESOLVED | Noted: 2017-08-15 | Resolved: 2025-03-19

## 2025-03-19 PROCEDURE — 3075F SYST BP GE 130 - 139MM HG: CPT | Performed by: FAMILY MEDICINE

## 2025-03-19 PROCEDURE — 36415 COLL VENOUS BLD VENIPUNCTURE: CPT | Performed by: FAMILY MEDICINE

## 2025-03-19 PROCEDURE — 3078F DIAST BP <80 MM HG: CPT | Performed by: FAMILY MEDICINE

## 2025-03-19 PROCEDURE — G2211 COMPLEX E/M VISIT ADD ON: HCPCS | Performed by: FAMILY MEDICINE

## 2025-03-19 PROCEDURE — G0439 PPPS, SUBSEQ VISIT: HCPCS | Performed by: FAMILY MEDICINE

## 2025-03-19 PROCEDURE — 99214 OFFICE O/P EST MOD 30 MIN: CPT | Mod: 25 | Performed by: FAMILY MEDICINE

## 2025-03-19 RX ORDER — SIMVASTATIN 40 MG
40 TABLET ORAL DAILY
Qty: 90 TABLET | Refills: 4 | Status: SHIPPED | OUTPATIENT
Start: 2025-03-19

## 2025-03-19 RX ORDER — METOPROLOL SUCCINATE 25 MG/1
25 TABLET, EXTENDED RELEASE ORAL DAILY
Qty: 30 TABLET | Refills: 4 | Status: SHIPPED | OUTPATIENT
Start: 2025-03-19

## 2025-03-19 RX ORDER — LOSARTAN POTASSIUM 50 MG/1
50 TABLET ORAL DAILY
Qty: 90 TABLET | Refills: 4 | Status: SHIPPED | OUTPATIENT
Start: 2025-03-19

## 2025-03-19 RX ORDER — ALENDRONATE SODIUM 70 MG/1
70 TABLET ORAL
Qty: 12 TABLET | Refills: 4 | Status: SHIPPED | OUTPATIENT
Start: 2025-03-19

## 2025-03-19 SDOH — HEALTH STABILITY: PHYSICAL HEALTH: ON AVERAGE, HOW MANY DAYS PER WEEK DO YOU ENGAGE IN MODERATE TO STRENUOUS EXERCISE (LIKE A BRISK WALK)?: 2 DAYS

## 2025-03-19 ASSESSMENT — SOCIAL DETERMINANTS OF HEALTH (SDOH): HOW OFTEN DO YOU GET TOGETHER WITH FRIENDS OR RELATIVES?: ONCE A WEEK

## 2025-03-19 NOTE — PATIENT INSTRUCTIONS
Patient Education   Preventive Care Advice   This is general advice given by our system to help you stay healthy. However, your care team may have specific advice just for you. Please talk to your care team about your preventive care needs.  Nutrition  Eat 5 or more servings of fruits and vegetables each day.  Try wheat bread, brown rice and whole grain pasta (instead of white bread, rice, and pasta).  Get enough calcium and vitamin D. Check the label on foods and aim for 100% of the RDA (recommended daily allowance).  Lifestyle  Exercise at least 150 minutes each week  (30 minutes a day, 5 days a week).  Do muscle strengthening activities 2 days a week. These help control your weight and prevent disease.  No smoking.  Wear sunscreen to prevent skin cancer.  Have a dental exam and cleaning every 6 months.  Yearly exams  See your health care team every year to talk about:  Any changes in your health.  Any medicines your care team has prescribed.  Preventive care, family planning, and ways to prevent chronic diseases.  Shots (vaccines)   HPV shots (up to age 26), if you've never had them before.  Hepatitis B shots (up to age 59), if you've never had them before.  COVID-19 shot: Get this shot when it's due.  Flu shot: Get a flu shot every year.  Tetanus shot: Get a tetanus shot every 10 years.  Pneumococcal, hepatitis A, and RSV shots: Ask your care team if you need these based on your risk.  Shingles shot (for age 50 and up)  General health tests  Diabetes screening:  Starting at age 35, Get screened for diabetes at least every 3 years.  If you are younger than age 35, ask your care team if you should be screened for diabetes.  Cholesterol test: At age 39, start having a cholesterol test every 5 years, or more often if advised.  Bone density scan (DEXA): At age 50, ask your care team if you should have this scan for osteoporosis (brittle bones).  Hepatitis C: Get tested at least once in your life.  STIs (sexually  transmitted infections)  Before age 24: Ask your care team if you should be screened for STIs.  After age 24: Get screened for STIs if you're at risk. You are at risk for STIs (including HIV) if:  You are sexually active with more than one person.  You don't use condoms every time.  You or a partner was diagnosed with a sexually transmitted infection.  If you are at risk for HIV, ask about PrEP medicine to prevent HIV.  Get tested for HIV at least once in your life, whether you are at risk for HIV or not.  Cancer screening tests  Cervical cancer screening: If you have a cervix, begin getting regular cervical cancer screening tests starting at age 21.  Breast cancer scan (mammogram): If you've ever had breasts, begin having regular mammograms starting at age 40. This is a scan to check for breast cancer.  Colon cancer screening: It is important to start screening for colon cancer at age 45.  Have a colonoscopy test every 10 years (or more often if you're at risk) Or, ask your provider about stool tests like a FIT test every year or Cologuard test every 3 years.  To learn more about your testing options, visit:   .  For help making a decision, visit:   https://bit.ly/kt68729.  Prostate cancer screening test: If you have a prostate, ask your care team if a prostate cancer screening test (PSA) at age 55 is right for you.  Lung cancer screening: If you are a current or former smoker ages 50 to 80, ask your care team if ongoing lung cancer screenings are right for you.  For informational purposes only. Not to replace the advice of your health care provider. Copyright   2023 [facility] [employer]. All rights reserved. Clinically reviewed by the [facility] Transitions Program. Pin or Peg 633677 - REV 01/24.  Hearing Loss: Care Instructions  Overview     Hearing loss is a sudden or slow decrease in how well you hear. It can range from slight to profound. Permanent hearing loss can occur with aging. It also can  happen when you are exposed long-term to loud noise. Examples include listening to loud music, riding motorcycles, or being around other loud machines.  Hearing loss can affect your work and home life. It can make you feel lonely or depressed. You may feel that you have lost your independence. But hearing aids and other devices can help you hear better and feel connected to others.  Follow-up care is a key part of your treatment and safety. Be sure to make and go to all appointments, and call your doctor if you are having problems. It's also a good idea to know your test results and keep a list of the medicines you take.  How can you care for yourself at home?  Avoid loud noises whenever possible. This helps keep your hearing from getting worse.  Always wear hearing protection around loud noises.  Wear a hearing aid as directed.  A professional can help you pick a hearing aid that will work best for you.  You can also get hearing aids over the counter for mild to moderate hearing loss.  Have hearing tests as your doctor suggests. They can show whether your hearing has changed. Your hearing aid may need to be adjusted.  Use other devices as needed. These may include:  Telephone amplifiers and hearing aids that can connect to a television, stereo, radio, or microphone.  Devices that use lights or vibrations. These alert you to the doorbell, a ringing telephone, or a baby monitor.  Television closed-captioning. This shows the words at the bottom of the screen. Most new TVs can do this.  TTY (text telephone). This lets you type messages back and forth on the telephone instead of talking or listening. These devices are also called TDD. When messages are typed on the keyboard, they are sent over the phone line to a receiving TTY. The message is shown on a monitor.  Use text messaging, social media, and email if it is hard for you to communicate by telephone.  Try to learn a listening technique called speechreading. It is  "not lipreading. You pay attention to people's gestures, expressions, posture, and tone of voice. These clues can help you understand what a person is saying. Face the person you are talking to, and have them face you. Make sure the lighting is good. You need to see the other person's face clearly.  Think about counseling if you need help to adjust to your hearing loss.  When should you call for help?  Watch closely for changes in your health, and be sure to contact your doctor if:    You think your hearing is getting worse.     You have new symptoms, such as dizziness or nausea.   Where can you learn more?  Go to https://www.[employer].net/patiented  Enter R798 in the search box to learn more about \"Hearing Loss: Care Instructions.\"  Current as of: October 27, 2024  Content Version: 14.4    7576-0347 [employer].   Care instructions adapted under license by your healthcare professional. If you have questions about a medical condition or this instruction, always ask your healthcare professional. [employer] disclaims any warranty or liability for your use of this information.       "

## 2025-03-19 NOTE — PROGRESS NOTES
"Preventive Care Visit  [facility] EDGAR Patel MD, Family Medicine  Mar 19, 2025      Assessment & Plan     Encounter for Medicare annual wellness exam    Age-related osteoporosis without current pathological fracture  - alendronate (FOSAMAX) 70 MG tablet; Take 1 tablet (70 mg) by mouth every 7 days.  - OFFICE/OUTPT VISIT,EST,LEVL IV    Primary hyperparathyroidism  - Parathyroid Hormone Intact; Future  - OFFICE/OUTPT VISIT,EST,LEVL IV    Hypertension goal BP (blood pressure) < 140/90  Well controlled with medications without side effects.   - BASIC METABOLIC PANEL; Future  - losartan (COZAAR) 50 MG tablet; Take 1 tablet (50 mg) by mouth daily.  - metoprolol succinate ER (TOPROL XL) 25 MG 24 hr tablet; Take 1 tablet (25 mg) by mouth daily.  - OFFICE/OUTPT VISIT,EST,LEVL IV    High cholesterol  Well controlled with medications without side effects.   - Lipid panel reflex to direct LDL Non-fasting; Future  - simvastatin (ZOCOR) 40 MG tablet; Take 1 tablet (40 mg) by mouth daily.  - OFFICE/OUTPT VISIT,EST,LEVL IV    Cystocele, midline  Urge incontinence of urine  Discussed lifestyle strategies and medication options; follow         The longitudinal plan of care for the diagnosis(es)/condition(s) as documented were addressed during this visit. Due to the added complexity in care, I will continue to support Tracie in the subsequent management and with ongoing continuity of care.    BMI  Estimated body mass index is 26.53 kg/m  as calculated from the following:    Height as of this encounter: 1.654 m (5' 5.12\").    Weight as of this encounter: 72.6 kg (160 lb).       Counseling  Appropriate preventive services were addressed with this patient via screening, questionnaire, or discussion as appropriate for fall prevention, nutrition, physical activity, Tobacco-use cessation, social engagement, weight loss and cognition.  Checklist reviewing preventive services available has been given to the " patient.  Reviewed patient's diet, addressing concerns and/or questions.   She is at risk for lack of exercise and has been provided with information to increase physical activity for the benefit of her well-being.   The patient was provided with written information regarding signs of hearing loss.       Follow up yearly for preventive visit with me.     Rolo Hodges is a 79 year old, presenting for the following:  Physical        3/19/2025    11:34 AM   Additional Questions   Roomed by Flor SOUZA CMA   Accompanied by Self         3/19/2025    11:34 AM   Patient Reported Additional Medications   Patient reports taking the following new medications None           HPI  Patient has osteoporosis by prior DEXA scan in unknown control with medications.    Hypertension well controlled on current medications without side effects, chest pain, or dyspnea.     Hypercholesterolemia well controlled with current treatment plan without side effects.      Wondering if she should keep taking the alendronate.      Advance Care Planning  Patient has a Health Care Directive on file  Advance care planning document is on file and is current.      3/19/2025   General Health   How would you rate your overall physical health? Good   Feel stress (tense, anxious, or unable to sleep) Not at all         3/19/2025   Nutrition   Diet: Regular (no restrictions)         3/19/2025   Exercise   Days per week of moderate/strenous exercise 2 days   (!) EXERCISE CONCERN      3/19/2025   Social Factors   Frequency of gathering with friends or relatives Once a week   Worry food won't last until get money to buy more No   Food not last or not have enough money for food? No   Do you have housing? (Housing is defined as stable permanent housing and does not include staying ouside in a car, in a tent, in an abandoned building, in an overnight shelter, or couch-surfing.) Yes   Are you worried about losing your housing? No   Lack of transportation? No    Unable to get utilities (heat,electricity)? No         3/19/2025   Fall Risk   Fallen 2 or more times in the past year? No   Trouble with walking or balance? No          3/19/2025   Activities of Daily Living- Home Safety   Needs help with the following daily activites None of the above   Safety concerns in the home None of the above         3/19/2025   Dental   Dentist two times every year? Yes         3/19/2025   Hearing Screening   Hearing concerns? (!) I NEED TO ASK PEOPLE TO SPEAK UP OR REPEAT THEMSELVES.         3/19/2025   Driving Risk Screening   Patient/family members have concerns about driving No         3/19/2025   General Alertness/Fatigue Screening   Have you been more tired than usual lately? No         3/19/2025   Urinary Incontinence Screening   Bothered by leaking urine in past 6 months No           Today's PHQ-2 Score:       3/19/2025    11:28 AM   PHQ-2 (  [employer])   Q1: Little interest or pleasure in doing things 0   Q2: Feeling down, depressed or hopeless 0   PHQ-2 Score 0    Q1: Little interest or pleasure in doing things Not at all   Q2: Feeling down, depressed or hopeless Not at all   PHQ-2 Score 0       Patient-reported           3/19/2025   Substance Use   Alcohol more than 3/day or more than 7/wk No   Do you have a current opioid prescription? No   How severe/bad is pain from 1 to 10? 2/10   Do you use any other substances recreationally? No     Social History     Tobacco Use    Smoking status: Former     Current packs/day: 0.00     Average packs/day: 0.5 packs/day for 1 year (0.5 ttl pk-yrs)     Types: Cigarettes     Start date: 1966     Quit date: 1967     Years since quittin.2     Passive exposure: Past    Smokeless tobacco: Never   Vaping Use    Vaping status: Never Used   Substance Use Topics    Alcohol use: Not Currently     Alcohol/week: 2.5 standard drinks of alcohol     Types: 3 Standard drinks or equivalent per week    Drug use: No           10/9/2024   LAST  FHS-7 RESULTS   1st degree relative breast or ovarian cancer No   Any relative bilateral breast cancer No   Any male have breast cancer No   Any ONE woman have BOTH breast AND ovarian cancer No   Any woman with breast cancer before 50yrs No   2 or more relatives with breast AND/OR ovarian cancer No   2 or more relatives with breast AND/OR bowel cancer No        Mammogram Screening - After age 74- determine frequency with patient based on health status, life expectancy and patient goals    ASCVD Risk   The 10-year ASCVD risk score (Mukesh RIVERA, et al., 2019) is: 26%    Values used to calculate the score:      Age: 79 years      Sex: Female      Is Non- : No      Diabetic: No      Tobacco smoker: No      Systolic Blood Pressure: 130 mmHg      Is BP treated: Yes      HDL Cholesterol: 48 mg/dL      Total Cholesterol: 169 mg/dL            Reviewed and updated as needed this visit by Provider   Tobacco  Allergies  Meds  Problems  Med Hx  Surg Hx  Fam Hx     Sexual Activity          Patient Active Problem List   Diagnosis    Allergic rhinitis    High cholesterol    Hypertension goal BP (blood pressure) < 140/90    Cataract    Venous (peripheral) insufficiency    Primary hyperparathyroidism    Urge incontinence of urine    Personal history of malignant neoplasm of breast    Primary osteoarthritis of both hips    History of iron deficiency    Lumbar disc herniation with radiculopathy    Primary osteoarthritis of right hand    Osteoporosis     Past Surgical History:   Procedure Laterality Date    C/SECTION, LOW TRANSVERSE  4/2/80    COLONOSCOPY  6/21/2012    Procedure: COLONOSCOPY;  COLONOSCOPY, SCREEN, PREVIOUS POLYP;  Surgeon: Maverick Maradiaga MD;  Location: [facility] OR    EYE SURGERY Bilateral 2017    cataract    HYSTERECTOMY, PAP NO LONGER INDICATED  9/30/09    BSO     LUMPECTOMY BREAST Left 08/18/2017       Social History     Tobacco Use    Smoking status: Former     Current  packs/day: 0.00     Average packs/day: 0.5 packs/day for 1 year (0.5 ttl pk-yrs)     Types: Cigarettes     Start date: 1966     Quit date: 1967     Years since quittin.2     Passive exposure: Past    Smokeless tobacco: Never   Substance Use Topics    Alcohol use: Not Currently     Alcohol/week: 2.5 standard drinks of alcohol     Types: 3 Standard drinks or equivalent per week     Family History   Problem Relation Age of Onset    Cancer Mother         d. pelvic    C.A.D. Mother         ?    C.A.D. Father 79        MI, d.    Alzheimer Disease Father     Gastrointestinal Disease Father         PUD    Cancer Maternal Aunt         GI?    Diabetes No family hx of          Current providers sharing in care for this patient include:  Patient Care Team:  Melanie Patel MD as PCP - General  Melanie Patel MD as Assigned PCP    The following health maintenance items are reviewed in Epic and correct as of today:  Health Maintenance   Topic Date Due    PARATHYROID  2024    BMP  2025    LIPID  2025    COVID-19 Vaccine ( season) 2025    DEXA  2026    MEDICARE ANNUAL WELLNESS VISIT  2026    ANNUAL REVIEW OF HM ORDERS  2026    FALL RISK ASSESSMENT  2026    DIABETES SCREENING  2027    ADVANCE CARE PLANNING  2030    DTAP/TDAP/TD IMMUNIZATION (4 - Td or Tdap) 2031    HEPATITIS C SCREENING  Completed    PHQ-2 (once per calendar year)  Completed    INFLUENZA VACCINE  Completed    Pneumococcal Vaccine: 50+ Years  Completed    ZOSTER IMMUNIZATION  Completed    RSV VACCINE  Completed    HPV IMMUNIZATION  Aged Out    MENINGITIS IMMUNIZATION  Aged Out    MAMMO SCREENING  Discontinued    COLORECTAL CANCER SCREENING  Discontinued         Review of Systems  CONSTITUTIONAL: NEGATIVE for fever, chills, change in weight  ENT/MOUTH: NEGATIVE for ear, mouth and throat problems  RESP: NEGATIVE for significant cough or SOB  CV: NEGATIVE for  "chest pain, palpitations or peripheral edema  : urge incontinence and bulging tissue      Objective    Exam  /79 (BP Location: Left arm, Patient Position: Sitting, Cuff Size: Adult Regular)   Pulse 71   Temp 98.5  F (36.9  C) (Oral)   Resp 16   Ht 1.654 m (5' 5.12\")   Wt 72.6 kg (160 lb)   SpO2 98%   BMI 26.53 kg/m     Estimated body mass index is 26.53 kg/m  as calculated from the following:    Height as of this encounter: 1.654 m (5' 5.12\").    Weight as of this encounter: 72.6 kg (160 lb).    Physical Exam  GENERAL: alert and no distress  EYES: Eyes grossly normal to inspection, PERRL and conjunctivae and sclerae normal  HENT: ear canals and TM's normal, nose and mouth without ulcers or lesions  NECK: no adenopathy, no asymmetry, masses, or scars  RESP: lungs clear to auscultation - no rales, rhonchi or wheezes  CV: regular rate and rhythm, normal S1 S2, no S3 or S4, no murmur, click or rub, woody peripheral edema  ABDOMEN: soft, nontender, no hepatosplenomegaly, no masses and bowel sounds normal   (female): normal urethral meatus  and mild cystocele   MS: extremities normal- no gross deformities noted  SKIN: no suspicious lesions or rashes and varicosities - lower legs  NEURO: Normal strength and tone, mentation intact and speech normal  PSYCH: mentation appears normal, affect normal/bright        3/19/2025   Mini Cog   Clock Draw Score 2 Normal   3 Item Recall 2 objects recalled   Mini Cog Total Score 4              Signed Electronically by: Melanie Patel MD    "

## 2025-03-20 LAB
ANION GAP SERPL CALCULATED.3IONS-SCNC: 4 MMOL/L (ref 7–15)
BUN SERPL-MCNC: 10.2 MG/DL (ref 8–23)
CALCIUM SERPL-MCNC: 11.1 MG/DL (ref 8.8–10.4)
CHLORIDE SERPL-SCNC: 104 MMOL/L (ref 98–107)
CHOLEST SERPL-MCNC: 161 MG/DL
CREAT SERPL-MCNC: 0.53 MG/DL (ref 0.51–0.95)
EGFRCR SERPLBLD CKD-EPI 2021: >90 ML/MIN/1.73M2
FASTING STATUS PATIENT QL REPORTED: NO
FASTING STATUS PATIENT QL REPORTED: NO
GLUCOSE SERPL-MCNC: 93 MG/DL (ref 70–99)
HCO3 SERPL-SCNC: 30 MMOL/L (ref 22–29)
HDLC SERPL-MCNC: 52 MG/DL
LDLC SERPL CALC-MCNC: 79 MG/DL
NONHDLC SERPL-MCNC: 109 MG/DL
POTASSIUM SERPL-SCNC: 4.2 MMOL/L (ref 3.4–5.3)
PTH-INTACT SERPL-MCNC: 70 PG/ML (ref 15–65)
SODIUM SERPL-SCNC: 138 MMOL/L (ref 135–145)
TRIGL SERPL-MCNC: 150 MG/DL

## 2025-03-21 NOTE — RESULT ENCOUNTER NOTE
Mail letter:    Your tests are stable over time. Normal cholesterol, kidney and blood glucose tests.     Melanie Patel MD

## 2025-03-26 ENCOUNTER — TELEPHONE (OUTPATIENT)
Dept: FAMILY MEDICINE | Facility: CLINIC | Age: 80
End: 2025-03-26
Payer: MEDICARE

## 2025-03-26 NOTE — TELEPHONE ENCOUNTER
"Spoke with patient. Relayed provider's message as written. Patient verbalized understanding and has no further questions at this time.    \"  Melanie Patel MD  3/20/2025  8:22 PM CDT       Mail letter:     Your tests are stable over time. Normal cholesterol, kidney and blood glucose tests.     Melanie Patel MD   \"    EMI Felder RN  [facility]  "

## 2025-03-26 NOTE — TELEPHONE ENCOUNTER
Test Results    Contacts       Contact Date/Time Type Contact Phone/Fax    03/26/2025 04:30 PM CDT Phone (Incoming) Tracie Feliciano (Self) [phone] ()            Who ordered the test:  PCP     Type of test: Lab    Date of test:  3/19/25    Where was the test performed:  [facility]     What are your questions/concerns?:  Discuss results with pt, she has questions regarding results   Okay to leave a detailed message?: N/A at Home number on file [phone] ([place])

## 2025-07-15 ENCOUNTER — TELEPHONE (OUTPATIENT)
Dept: FAMILY MEDICINE | Facility: CLINIC | Age: 80
End: 2025-07-15
Payer: MEDICARE

## 2025-07-15 NOTE — TELEPHONE ENCOUNTER
Order/Referral Request    Who is requesting:  Patient    Orders being requested:  Mammogram     Reason service is needed/diagnosis:  N/A    When are orders needed by: ASAP    Has this been discussed with Provider: Please check to see if patient is due for a mammo. If she is please put in orders so she can get it done.     Also, she made an appt to see Dr. Patel, but no openings until Sept 2nd. If any sooner appt opens up, please call patient.     Does patient have a preference on a Group/Provider/Facility? [facility]    Does patient have an appointment scheduled?: No    Where to send orders: Place orders within Epic    Please call to let patient know orders are in so she can schedule accordingly.     Okay to leave a detailed message?: Yes at Cell number on file:    Telephone Information:   Mobile [phone]

## 2025-07-16 NOTE — TELEPHONE ENCOUNTER
Patient had a recent mammogram 10/2024, per letter states that she due in a year and would be 10/2025.   Contacted the patient to inform her, she is aware.

## 2025-09-02 ENCOUNTER — OFFICE VISIT (OUTPATIENT)
Dept: FAMILY MEDICINE | Facility: CLINIC | Age: 80
End: 2025-09-02
Payer: MEDICARE

## 2025-09-02 VITALS
BODY MASS INDEX: 26.33 KG/M2 | SYSTOLIC BLOOD PRESSURE: 120 MMHG | HEIGHT: 65 IN | WEIGHT: 158 LBS | TEMPERATURE: 98.3 F | HEART RATE: 77 BPM | RESPIRATION RATE: 16 BRPM | OXYGEN SATURATION: 96 % | DIASTOLIC BLOOD PRESSURE: 77 MMHG

## 2025-09-02 DIAGNOSIS — M81.0 AGE-RELATED OSTEOPOROSIS WITHOUT CURRENT PATHOLOGICAL FRACTURE: ICD-10-CM

## 2025-09-02 DIAGNOSIS — I10 HYPERTENSION GOAL BP (BLOOD PRESSURE) < 140/90: ICD-10-CM

## 2025-09-02 DIAGNOSIS — N39.41 URGE INCONTINENCE OF URINE: Primary | ICD-10-CM

## 2025-09-02 DIAGNOSIS — N81.11 CYSTOCELE, MIDLINE: ICD-10-CM

## 2025-09-02 PROCEDURE — 99214 OFFICE O/P EST MOD 30 MIN: CPT | Performed by: FAMILY MEDICINE

## 2025-09-02 PROCEDURE — 3074F SYST BP LT 130 MM HG: CPT | Performed by: FAMILY MEDICINE

## 2025-09-02 PROCEDURE — G2211 COMPLEX E/M VISIT ADD ON: HCPCS | Performed by: FAMILY MEDICINE

## 2025-09-02 PROCEDURE — 3078F DIAST BP <80 MM HG: CPT | Performed by: FAMILY MEDICINE

## 2025-09-02 RX ORDER — OXYBUTYNIN CHLORIDE 5 MG/1
5 TABLET, EXTENDED RELEASE ORAL DAILY
Qty: 90 TABLET | Refills: 4 | Status: SHIPPED | OUTPATIENT
Start: 2025-09-02